# Patient Record
Sex: MALE | Race: WHITE | NOT HISPANIC OR LATINO | Employment: OTHER | URBAN - NONMETROPOLITAN AREA
[De-identification: names, ages, dates, MRNs, and addresses within clinical notes are randomized per-mention and may not be internally consistent; named-entity substitution may affect disease eponyms.]

---

## 2017-03-07 ENCOUNTER — TELEPHONE (OUTPATIENT)
Dept: UROLOGY | Facility: CLINIC | Age: 67
End: 2017-03-07

## 2017-03-07 DIAGNOSIS — R97.20 ELEVATED PROSTATE SPECIFIC ANTIGEN (PSA): Primary | ICD-10-CM

## 2017-03-07 NOTE — TELEPHONE ENCOUNTER
----- Message from Karen Abreu sent at 3/7/2017  3:22 PM CST -----  Contact: TEREZA SALGADO  Needs psa order for 3/9/17

## 2017-03-13 ENCOUNTER — OFFICE VISIT (OUTPATIENT)
Dept: UROLOGY | Facility: CLINIC | Age: 67
End: 2017-03-13

## 2017-03-13 VITALS
SYSTOLIC BLOOD PRESSURE: 142 MMHG | TEMPERATURE: 97.2 F | BODY MASS INDEX: 28.63 KG/M2 | DIASTOLIC BLOOD PRESSURE: 80 MMHG | WEIGHT: 200 LBS | HEIGHT: 70 IN

## 2017-03-13 DIAGNOSIS — R97.20 ELEVATED PROSTATE SPECIFIC ANTIGEN (PSA): ICD-10-CM

## 2017-03-13 DIAGNOSIS — N40.1 BENIGN NON-NODULAR PROSTATIC HYPERPLASIA WITH LOWER URINARY TRACT SYMPTOMS: Primary | ICD-10-CM

## 2017-03-13 LAB
BILIRUB BLD-MCNC: NEGATIVE MG/DL
CLARITY, POC: CLEAR
COLOR UR: YELLOW
GLUCOSE UR STRIP-MCNC: NEGATIVE MG/DL
KETONES UR QL: NEGATIVE
LEUKOCYTE EST, POC: NEGATIVE
NITRITE UR-MCNC: NEGATIVE MG/ML
PH UR: 7 [PH] (ref 5–8)
PROT UR STRIP-MCNC: NEGATIVE MG/DL
RBC # UR STRIP: NEGATIVE /UL
SP GR UR: 1.02 (ref 1–1.03)
UROBILINOGEN UR QL: NORMAL

## 2017-03-13 PROCEDURE — 81003 URINALYSIS AUTO W/O SCOPE: CPT | Performed by: UROLOGY

## 2017-03-13 PROCEDURE — 99213 OFFICE O/P EST LOW 20 MIN: CPT | Performed by: UROLOGY

## 2017-03-13 RX ORDER — TAMSULOSIN HYDROCHLORIDE 0.4 MG/1
1 CAPSULE ORAL DAILY
COMMUNITY
Start: 2017-02-28

## 2017-03-13 RX ORDER — TIOTROPIUM BROMIDE AND OLODATEROL 3.124; 2.736 UG/1; UG/1
2 SPRAY, METERED RESPIRATORY (INHALATION) DAILY
COMMUNITY
Start: 2017-02-15

## 2017-03-13 RX ORDER — AMLODIPINE BESYLATE AND BENAZEPRIL HYDROCHLORIDE 10; 40 MG/1; MG/1
1 CAPSULE ORAL DAILY
COMMUNITY
Start: 2017-02-08

## 2017-03-13 RX ORDER — HYDROCODONE BITARTRATE AND ACETAMINOPHEN 10; 325 MG/1; MG/1
1 TABLET ORAL EVERY 8 HOURS PRN
COMMUNITY
Start: 2017-03-09

## 2017-03-13 RX ORDER — NEBIVOLOL HYDROCHLORIDE 5 MG/1
TABLET ORAL
COMMUNITY
Start: 2017-02-08 | End: 2021-05-24

## 2017-03-13 NOTE — PROGRESS NOTES
Mr. Mcdonald is 66 y.o. male    CHIEF COMPLAINT: Her about my prostate    HPI  Benign Prostatic hyperplasia  Patient was initially diagnosed with benign prostatic hyperplasia approximately4 years ago. This was identified in the context of worsening voiding symptoms. Severity of the diease would be moderate. This has been managed with Alpha blockers. Alpha blockers has/have not improved the symptoms to an acceptable level to the patient.  His disease has not been complicated by retention or recurrent infection. His most bothersome symptom(s) is/are nocturia x 2, urinary frequency, weak stream.      The following portions of the patient's history were reviewed and updated as appropriate: allergies, current medications, past family history, past medical history, past social history, past surgical history and problem list.    Review of Systems   Constitutional: Negative for fever.   Gastrointestinal: Negative for nausea and vomiting.   Genitourinary: Positive for frequency (1 every night or ealry in the morning.) and urgency. Negative for difficulty urinating. Testicular pain: sometimes.         Current Outpatient Prescriptions:   •  amLODIPine-benazepril (LOTREL) 10-40 MG per capsule, , Disp: , Rfl:   •  BYSTOLIC 5 MG tablet, , Disp: , Rfl:   •  HYDROcodone-acetaminophen (NORCO)  MG per tablet, , Disp: , Rfl:   •  STIOLTO RESPIMAT 2.5-2.5 MCG/ACT aerosol solution, , Disp: , Rfl:   •  tamsulosin (FLOMAX) 0.4 MG capsule 24 hr capsule, , Disp: , Rfl:   •  triazolam (HALCION) 0.25 MG tablet, , Disp: , Rfl:   •  VENTOLIN  (90 BASE) MCG/ACT inhaler, , Disp: , Rfl:     Past Medical History   Diagnosis Date   • Elevated PSA    • Hypercholesteremia    • Hypertension        Past Surgical History   Procedure Laterality Date   • Back surgery         Social History     Social History   • Marital status: Single     Spouse name: N/A   • Number of children: N/A   • Years of education: N/A     Social History Main Topics  "  • Smoking status: Current Every Day Smoker     Packs/day: 1.00   • Smokeless tobacco: Not on file   • Alcohol use 1.8 oz/week     2 Cans of beer, 1 Shots of liquor per week   • Drug use: Not on file   • Sexual activity: Not on file     Other Topics Concern   • Not on file     Social History Narrative   • No narrative on file       Family History   Problem Relation Age of Onset   • Cancer Father        Visit Vitals   • /80   • Temp 97.2 °F (36.2 °C)   • Ht 70\" (177.8 cm)   • Wt 200 lb (90.7 kg)   • BMI 28.7 kg/m2       Physical Exam   Constitutional: He is oriented to person, place, and time. He appears well-developed and well-nourished.   Pulmonary/Chest: Effort normal.   Abdominal: Soft. He exhibits no distension and no mass. There is no tenderness. There is no rebound and no guarding. No hernia.   Genitourinary: Penis normal. Rectal exam shows no mass, no tenderness and anal tone normal. Enlarged: for the age of the patient. Right testis shows no mass, no swelling and no tenderness. Left testis shows no mass, no swelling and no tenderness. No hypospadias. No discharge found.   Genitourinary Comments:  The urethral meatus normal in position without evidence of stricture. Epididymis has some firmness and possible cystic mass at the tail of the left epididymis Vas Deferens is palpably normal.Anus and perineum without mass or tenderness. The prostate is approximately 35 ml. It is Symmetric, with a Soft consistency. There are no nodules present. . The seminal vesicles are Not palpable due to the size of the prostate.     Neurological: He is alert and oriented to person, place, and time.   Vitals reviewed.        Results for orders placed or performed in visit on 03/13/17   POC Urinalysis Dipstick, Automated   Result Value Ref Range    Color Yellow Yellow, Straw, Dark Yellow, [place]    Clarity, UA Clear Clear    Glucose, UA Negative Negative, 1000 mg/dL (3+) mg/dL    Bilirubin Negative Negative    Ketones, UA " Negative Negative    Specific Gravity  1.020 1.005 - 1.030    Blood, UA Negative Negative    pH, Urine 7.0 5.0 - 8.0    Protein, POC Negative Negative mg/dL    Urobilinogen, UA Normal Normal    Leukocytes Negative Negative    Nitrite, UA Negative Negative     03/17: PSA 2.8 ng/ml  Assessment and Plan  Diagnoses and all orders for this visit:    Benign non-nodular prostatic hyperplasia with lower urinary tract symptoms    Elevated prostate specific antigen (PSA)  -     POC Urinalysis Dipstick, Automated     BPH symptoms are relatively stable.  Recently started on tamsulosin but has decided he just wants to stop that.  Since he is having mostly irritative symptoms I talked to him about using an anticholinergic or Myrbetriq but he says he rather not be on a medication and just tolerated the symptoms.  Since he has had no complicating factors from his BPH I think is reasonable that he be on watchful waiting.    His PSA is stable at 2.8.  I initially saw him for a PSA just under 4.    Fabiano Saldaña MD  03/13/17  12:55 PM    EMR Dragon/Transcription disclaimer:  Much of this encounter note is an electronic transcription/translation of spoken language to printed text. The electronic translation of spoken language may permit erroneous, or at times, nonsensical words or phrases to be inadvertently transcribed; although I have reviewed the note for such errors, some may still exist.       Cc:

## 2018-05-22 ENCOUNTER — OFFICE VISIT (OUTPATIENT)
Dept: FAMILY MEDICINE CLINIC | Facility: CLINIC | Age: 68
End: 2018-05-22

## 2018-05-22 VITALS
TEMPERATURE: 98.1 F | OXYGEN SATURATION: 98 % | WEIGHT: 182 LBS | BODY MASS INDEX: 26.05 KG/M2 | HEIGHT: 70 IN | SYSTOLIC BLOOD PRESSURE: 146 MMHG | DIASTOLIC BLOOD PRESSURE: 78 MMHG | RESPIRATION RATE: 16 BRPM | HEART RATE: 76 BPM

## 2018-05-22 DIAGNOSIS — T63.481A ALLERGIC REACTION TO INSECT STING, ACCIDENTAL OR UNINTENTIONAL, INITIAL ENCOUNTER: Primary | ICD-10-CM

## 2018-05-22 DIAGNOSIS — Z72.0 TOBACCO ABUSE: ICD-10-CM

## 2018-05-22 DIAGNOSIS — E66.3 OVERWEIGHT (BMI 25.0-29.9): ICD-10-CM
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
## 2018-05-22 PROBLEM — G89.29 CHRONIC PAIN: Status: ACTIVE | Noted: 2018-05-22

## 2018-05-22 PROBLEM — I10 ESSENTIAL HYPERTENSION: Status: ACTIVE | Noted: 2018-05-22

## 2018-05-22 PROBLEM — R97.20 ELEVATED PSA: Status: ACTIVE | Noted: 2018-05-22

## 2018-05-22 PROCEDURE — 90471 IMMUNIZATION ADMIN: CPT | Performed by: NURSE PRACTITIONER

## 2018-05-22 PROCEDURE — 99203 OFFICE O/P NEW LOW 30 MIN: CPT | Performed by: NURSE PRACTITIONER

## 2018-05-22 PROCEDURE — 90715 TDAP VACCINE 7 YRS/> IM: CPT | Performed by: NURSE PRACTITIONER

## 2018-05-22 PROCEDURE — 96372 THER/PROPH/DIAG INJ SC/IM: CPT | Performed by: NURSE PRACTITIONER

## 2018-05-22 RX ORDER — AMOXICILLIN 500 MG/1
500 CAPSULE ORAL 2 TIMES DAILY
Qty: 20 CAPSULE | Refills: 0 | Status: SHIPPED | OUTPATIENT
Start: 2018-05-22 | End: 2018-06-01

## 2018-05-22 RX ORDER — DEXAMETHASONE SODIUM PHOSPHATE 10 MG/ML
10 INJECTION INTRAMUSCULAR; INTRAVENOUS ONCE
Status: COMPLETED | OUTPATIENT
Start: 2018-05-22 | End: 2018-05-22

## 2018-05-22 RX ORDER — CETIRIZINE HYDROCHLORIDE 10 MG/1
10 TABLET ORAL DAILY
Qty: 15 TABLET | Refills: 0 | Status: SHIPPED | OUTPATIENT
Start: 2018-05-22 | End: 2021-05-24

## 2018-05-22 RX ADMIN — DEXAMETHASONE SODIUM PHOSPHATE 10 MG: 10 INJECTION INTRAMUSCULAR; INTRAVENOUS at 10:49

## 2018-05-22 NOTE — PATIENT INSTRUCTIONS
"DASH Eating Plan  DASH stands for \"Dietary Approaches to Stop Hypertension.\" The DASH eating plan is a healthy eating plan that has been shown to reduce high blood pressure (hypertension). It may also reduce your risk for type 2 diabetes, heart disease, and stroke. The DASH eating plan may also help with weight loss.  What are tips for following this plan?  General guidelines   · Avoid eating more than 2,300 mg (milligrams) of salt (sodium) a day. If you have hypertension, you may need to reduce your sodium intake to 1,500 mg a day.  · Limit alcohol intake to no more than 1 drink a day for nonpregnant women and 2 drinks a day for men. One drink equals 12 oz of beer, 5 oz of wine, or 1½ oz of hard liquor.  · Work with your health care provider to maintain a healthy body weight or to lose weight. Ask what an ideal weight is for you.  · Get at least 30 minutes of exercise that causes your heart to beat faster (aerobic exercise) most days of the week. Activities may include walking, swimming, or biking.  · Work with your health care provider or diet and nutrition specialist (dietitian) to adjust your eating plan to your individual calorie needs.  Reading food labels   · Check food labels for the amount of sodium per serving. Choose foods with less than 5 percent of the Daily Value of sodium. Generally, foods with less than 300 mg of sodium per serving fit into this eating plan.  · To find whole grains, look for the word \"whole\" as the first word in the ingredient list.  Shopping   · Buy products labeled as \"low-sodium\" or \"no salt added.\"  · Buy fresh foods. Avoid canned foods and premade or frozen meals.  Cooking   · Avoid adding salt when cooking. Use salt-free seasonings or herbs instead of table salt or sea salt. Check with your health care provider or pharmacist before using salt substitutes.  · Do not garcia foods. Cook foods using healthy methods such as baking, boiling, grilling, and broiling instead.  · Cook with " heart-healthy oils, such as olive, canola, soybean, or sunflower oil.  Meal planning     · Eat a balanced diet that includes:  ¨ 5 or more servings of fruits and vegetables each day. At each meal, try to fill half of your plate with fruits and vegetables.  ¨ Up to 6-8 servings of whole grains each day.  ¨ Less than 6 oz of lean meat, poultry, or fish each day. A 3-oz serving of meat is about the same size as a deck of cards. One egg equals 1 oz.  ¨ 2 servings of low-fat dairy each day.  ¨ A serving of nuts, seeds, or beans 5 times each week.  ¨ Heart-healthy fats. Healthy fats called Omega-3 fatty acids are found in foods such as flaxseeds and coldwater fish, like sardines, salmon, and mackerel.  · Limit how much you eat of the following:  ¨ Canned or prepackaged foods.  ¨ Food that is high in trans fat, such as fried foods.  ¨ Food that is high in saturated fat, such as fatty meat.  ¨ Sweets, desserts, sugary drinks, and other foods with added sugar.  ¨ Full-fat dairy products.  · Do not salt foods before eating.  · Try to eat at least 2 vegetarian meals each week.  · Eat more home-cooked food and less restaurant, buffet, and fast food.  · When eating at a restaurant, ask that your food be prepared with less salt or no salt, if possible.  What foods are recommended?  The items listed may not be a complete list. Talk with your dietitian about what dietary choices are best for you.  Grains   Whole-grain or whole-wheat bread. Whole-grain or whole-wheat pasta. Brown rice. Oatmeal. Quinoa. Bulgur. Whole-grain and low-sodium cereals. Yany bread. Low-fat, low-sodium crackers. Whole-wheat flour tortillas.  Vegetables   Fresh or frozen vegetables (raw, steamed, roasted, or grilled). Low-sodium or reduced-sodium tomato and vegetable juice. Low-sodium or reduced-sodium tomato sauce and tomato paste. Low-sodium or reduced-sodium canned vegetables.  Fruits   All fresh, dried, or frozen fruit. Canned fruit in natural juice  (without added sugar).  Meat and other protein foods   Skinless chicken or turkey. Ground chicken or turkey. Pork with fat trimmed off. Fish and seafood. Egg whites. Dried beans, peas, or lentils. Unsalted nuts, nut butters, and seeds. Unsalted canned beans. Lean cuts of beef with fat trimmed off. Low-sodium, lean deli meat.  Dairy   Low-fat (1%) or fat-free (skim) milk. Fat-free, low-fat, or reduced-fat cheeses. Nonfat, low-sodium ricotta or cottage cheese. Low-fat or nonfat yogurt. Low-fat, low-sodium cheese.  Fats and oils   Soft margarine without trans fats. Vegetable oil. Low-fat, reduced-fat, or light mayonnaise and salad dressings (reduced-sodium). Canola, safflower, olive, soybean, and sunflower oils. Avocado.  Seasoning and other foods   Herbs. Spices. Seasoning mixes without salt. Unsalted popcorn and pretzels. Fat-free sweets.  What foods are not recommended?  The items listed may not be a complete list. Talk with your dietitian about what dietary choices are best for you.  Grains   Baked goods made with fat, such as croissants, muffins, or some breads. Dry pasta or rice meal packs.  Vegetables   Creamed or fried vegetables. Vegetables in a cheese sauce. Regular canned vegetables (not low-sodium or reduced-sodium). Regular canned tomato sauce and paste (not low-sodium or reduced-sodium). Regular tomato and vegetable juice (not low-sodium or reduced-sodium). Pickles. Olives.  Fruits   Canned fruit in a light or heavy syrup. Fried fruit. Fruit in cream or butter sauce.  Meat and other protein foods   Fatty cuts of meat. Ribs. Fried meat. Estrella. Sausage. Bologna and other processed lunch meats. Salami. Fatback. Hotdogs. Bratwurst. Salted nuts and seeds. Canned beans with added salt. Canned or smoked fish. Whole eggs or egg yolks. Chicken or turkey with skin.  Dairy   Whole or 2% milk, cream, and half-and-half. Whole or full-fat cream cheese. Whole-fat or sweetened yogurt. Full-fat cheese. Nondairy creamers.  Whipped toppings. Processed cheese and cheese spreads.  Fats and oils   Butter. Stick margarine. Lard. Shortening. Ghee. Estrella fat. Tropical oils, such as coconut, palm kernel, or palm oil.  Seasoning and other foods   Salted popcorn and pretzels. Onion salt, garlic salt, seasoned salt, table salt, and sea salt. Worcestershire sauce. Tartar sauce. Barbecue sauce. Teriyaki sauce. Soy sauce, including reduced-sodium. Steak sauce. Canned and packaged gravies. Fish sauce. Oyster sauce. Cocktail sauce. Horseradish that you find on the shelf. Ketchup. Mustard. Meat flavorings and tenderizers. Bouillon cubes. Hot sauce and Tabasco sauce. Premade or packaged marinades. Premade or packaged taco seasonings. Relishes. Regular salad dressings.  Where to find more information:  · National Heart, Lung, and Blood [place]: www.nhlbi.nih.gov  · American Heart Association: www.heart.org  Summary  · The DASH eating plan is a healthy eating plan that has been shown to reduce high blood pressure (hypertension). It may also reduce your risk for type 2 diabetes, heart disease, and stroke.  · With the DASH eating plan, you should limit salt (sodium) intake to 2,300 mg a day. If you have hypertension, you may need to reduce your sodium intake to 1,500 mg a day.  · When on the DASH eating plan, aim to eat more fresh fruits and vegetables, whole grains, lean proteins, low-fat dairy, and heart-healthy fats.  · Work with your health care provider or diet and nutrition specialist (dietitian) to adjust your eating plan to your individual calorie needs.  This information is not intended to replace advice given to you by your health care provider. Make sure you discuss any questions you have with your health care provider.  Document Released: 12/06/2012 Document Revised: 12/11/2017 Document Reviewed: 12/11/2017  [employer] Interactive Patient Education © 2017 [employer]  Insect Bite, Adult  An insect bite can make your skin red, itchy, and  swollen. Some insects can spread disease to people with a bite. However, most insect bites do not lead to disease, and most are not serious.  Follow these instructions at home:  Bite area care   · Do not scratch the bite area.  · Keep the bite area clean and dry.  · Wash the bite area every day with soap and water as told by your doctor.  · Check the bite area every day for signs of infection. Check for:  ¨ More redness, swelling, or pain.  ¨ Fluid or blood.  ¨ Warmth.  ¨ Pus.  Managing pain, itching, and swelling   · You may put any of these on the bite area as told by your doctor:  ¨ A baking soda paste.  ¨ Cortisone cream.  ¨ Calamine lotion.  · If directed, put ice on the bite area.  ¨ Put ice in a plastic bag.  ¨ Place a towel between your skin and the bag.  ¨ Leave the ice on for 20 minutes, 2-3 times a day.  Medicines   · Take medicines or put medicines on your skin only as told by your doctor.  · If you were prescribed an antibiotic medicine, use it as told by your doctor. Do not stop using the antibiotic even if your condition improves.  General instructions   · Keep all follow-up visits as told by your doctor. This is important.  How is this prevented?  To help you have a lower risk of insect bites:  · When you are outside, wear clothing that covers your arms and legs.  · Use insect repellent. The best insect repellents have:  ¨ An active ingredient of DEET, picaridin, oil of lemon eucalyptus (OLE), or [id number].  ¨ Higher amounts of DEET or another active ingredient than other repellents have.  · If your home windows do not have screens, think about putting some in.  Contact a doctor if:  · You have more redness, swelling, or pain in the bite area.  · You have fluid, blood, or pus coming from the bite area.  · The bite area feels warm.  · You have a fever.  Get help right away if:  · You have joint pain.  · You have a rash.  · You have shortness of breath.  · You feel more tired or sleepy than you normally  do.  · You have neck pain.  · You have a headache.  · You feel weaker than you normally do.  · You have chest pain.  · You have pain in your belly.  · You feel sick to your stomach (nauseous) or you throw up (vomit).  Summary  · An insect bite can make your skin red, itchy, and swollen.  · Do not scratch the bite area, and keep it clean and dry.  · Ice can help with pain and itching from the bite.  This information is not intended to replace advice given to you by your health care provider. Make sure you discuss any questions you have with your health care provider.  Document Released: 12/15/2001 Document Revised: 07/20/2017 Document Reviewed: 05/04/2016  [employer] Interactive Patient Education © 2017 [employer]  Steps to Quit Smoking  Smoking tobacco can be bad for your health. It can also affect almost every organ in your body. Smoking puts you and people around you at risk for many serious long-lasting (chronic) diseases. Quitting smoking is hard, but it is one of the best things that you can do for your health. It is never too late to quit.  What are the benefits of quitting smoking?  When you quit smoking, you lower your risk for getting serious diseases and conditions. They can include:  · Lung cancer or lung disease.  · Heart disease.  · Stroke.  · Heart attack.  · Not being able to have children (infertility).  · Weak bones (osteoporosis) and broken bones (fractures).  If you have coughing, wheezing, and shortness of breath, those symptoms may get better when you quit. You may also get sick less often. If you are pregnant, quitting smoking can help to lower your chances of having a baby of low birth weight.  What can I do to help me quit smoking?  Talk with your doctor about what can help you quit smoking. Some things you can do (strategies) include:  · Quitting smoking totally, instead of slowly cutting back how much you smoke over a period of time.  · Going to in-person counseling. You are more likely to  quit if you go to many counseling sessions.  · Using resources and support systems, such as:  ¨ Online chats with a counselor.  ¨ Phone quitlines.  ¨ Printed self-help materials.  ¨ Support groups or group counseling.  ¨ Text messaging programs.  ¨ Mobile phone apps or applications.  · Taking medicines. Some of these medicines may have nicotine in them. If you are pregnant or breastfeeding, do not take any medicines to quit smoking unless your doctor says it is okay. Talk with your doctor about counseling or other things that can help you.  Talk with your doctor about using more than one strategy at the same time, such as taking medicines while you are also going to in-person counseling. This can help make quitting easier.  What things can I do to make it easier to quit?  Quitting smoking might feel very hard at first, but there is a lot that you can do to make it easier. Take these steps:  · Talk to your family and friends. Ask them to support and encourage you.  · Call phone quitlines, reach out to support groups, or work with a counselor.  · Ask people who smoke to not smoke around you.  · Avoid places that make you want (trigger) to smoke, such as:  ¨ Bars.  ¨ Parties.  ¨ Smoke-break areas at work.  · Spend time with people who do not smoke.  · Lower the stress in your life. Stress can make you want to smoke. Try these things to help your stress:  ¨ Getting regular exercise.  ¨ Deep-breathing exercises.  ¨ Yoga.  ¨ Meditating.  ¨ Doing a body scan. To do this, close your eyes, focus on one area of your body at a time from head to toe, and notice which parts of your body are tense. Try to relax the muscles in those areas.  · Download or buy apps on your mobile phone or tablet that can help you stick to your quit plan. There are many free apps, such as QuitGuide from the CDC (Centers for Disease Control and Prevention). You can find more support from smokefree.gov and other websites.  This information is not  intended to replace advice given to you by your health care provider. Make sure you discuss any questions you have with your health care provider.  Document Released: 10/14/2010 Document Revised: 08/15/2017 Document Reviewed: 05/03/2016  [employer] Interactive Patient Education © 2017 [employer]

## 2018-05-22 NOTE — PROGRESS NOTES
"Chief Complaint   Patient presents with   • Facial Swelling     States he got a bite on the forehead Sunday and has been swelling and itching.   Seems to be getting worse.        HISTORY/ HPI:  Burt Mcdonald is a 67 y.o.  male who presents today for an acute complaint of bilateral edema below the eyes at the eyelid cheek junction and pruritis to the skin around the eyes and the forehead, onset approximately 3-4 days ago; Mr. Mcdonald states he was working on his lawnmower and felt a \"bite or sting\" to the right mid forehead; has used no medications to treat this complaint; denies fevers, chills, blurred vision/ visual disturbances, pain with occular motion, dyspnea, headaches, neck pain, or nausea; denies aggravating factors; pruritis is constant but has decreased since onset; currently  rates severity of symptoms 5 /10; has a medical history of hypertension, hyperlipidemia, chronic back pain, and an elevated PSA that is reported to be stable; reports KNDA; current everyday smoker, declines to discuss tobacco cessation at this time; consumes ETOH occasionally; denies use of illicit drugs; denies recent illnesses or additional concerns at this time.    Burt Mcdonald  has a past medical history of Elevated PSA; Hypercholesteremia; and Hypertension.    No Known Allergies    Current Outpatient Prescriptions:   •  amLODIPine-benazepril (LOTREL) 10-40 MG per capsule, , Disp: , Rfl:   •  BYSTOLIC 5 MG tablet, , Disp: , Rfl:   •  HYDROcodone-acetaminophen (NORCO)  MG per tablet, , Disp: , Rfl:   •  STIOLTO RESPIMAT 2.5-2.5 MCG/ACT aerosol solution, , Disp: , Rfl:   •  tamsulosin (FLOMAX) 0.4 MG capsule 24 hr capsule, , Disp: , Rfl:   •  triazolam (HALCION) 0.25 MG tablet, , Disp: , Rfl:   •  VENTOLIN  (90 BASE) MCG/ACT inhaler, , Disp: , Rfl:   Past Medical History:   Diagnosis Date   • Elevated PSA    • Hypercholesteremia    • Hypertension      Past Surgical History:   Procedure Laterality Date   • " BACK SURGERY     • EYE SURGERY      Bilateral cataract     Social History     Social History   • Marital status: Single     Social History Main Topics   • Smoking status: Current Every Day Smoker     Packs/day: 1.00   • Smokeless tobacco: Never Used   • Alcohol use 1.8 oz/week     2 Cans of beer, 1 Shots of liquor per week   • Drug use: No   • Sexual activity: Defer     Other Topics Concern   • Not on file     Family History   Problem Relation Age of Onset   • Cancer Father    • Cancer Mother    • No Known Problems Sister    • No Known Problems Brother    • No Known Problems Brother    • No Known Problems Brother      Family history, surgical history, past medical history, Allergies and med's reviewed with patient today and updated in [facility] EMR.     ROS:  Review of Systems   Constitutional: Negative.  Negative for activity change, appetite change, chills, diaphoresis, fatigue, fever and unexpected weight change.   HENT: Positive for facial swelling. Negative for congestion, ear discharge, ear pain, hearing loss, postnasal drip, rhinorrhea, sinus pain, sinus pressure, sneezing, sore throat, tinnitus and trouble swallowing.    Eyes: Positive for itching. Negative for photophobia, pain, discharge, redness and visual disturbance.   Respiratory: Positive for cough (chronic). Negative for chest tightness, shortness of breath and wheezing.    Cardiovascular: Negative.  Negative for chest pain, palpitations and leg swelling.   Gastrointestinal: Negative.  Negative for abdominal distention, abdominal pain, blood in stool, constipation, diarrhea, nausea and vomiting.   Endocrine: Negative.  Negative for cold intolerance, heat intolerance and polyuria.   Genitourinary: Positive for difficulty urinating (chronic). Negative for decreased urine volume, flank pain, frequency, hematuria and urgency.   Musculoskeletal: Positive for arthralgias (chronic). Negative for back pain, myalgias, neck pain and neck stiffness.   Skin: Positive  "for color change. Negative for pallor, rash and wound.   Allergic/Immunologic: Positive for environmental allergies.   Neurological: Positive for headaches (intermittent). Negative for dizziness, syncope, weakness and light-headedness.   Hematological: Negative for adenopathy. Does not bruise/bleed easily.   Psychiatric/Behavioral: Negative.  Negative for behavioral problems, decreased concentration, dysphoric mood, self-injury, sleep disturbance and suicidal ideas. The patient is not nervous/anxious.    All other systems reviewed and are negative.    OBJECTIVE:  Vitals:    05/22/18 1005   BP: 146/78   BP Location: Right arm   Patient Position: Sitting   Cuff Size: Adult   Pulse: 76   Resp: 16   Temp: 98.1 °F (36.7 °C)   TempSrc: Oral   SpO2: 98%   Weight: 82.6 kg (182 lb)   Height: 177.8 cm (70\")     Physical Exam   Constitutional: He is oriented to person, place, and time. He appears well-developed and well-nourished. He is cooperative.  Non-toxic appearance. He does not have a sickly appearance. He does not appear ill. No distress.   Weight 182 LBS; BMI 26.1   HENT:   Head: Normocephalic.   Right Ear: Hearing, tympanic membrane, external ear and ear canal normal. No drainage, swelling or tenderness. No foreign bodies. No mastoid tenderness. Tympanic membrane is not injected, not scarred, not perforated, not erythematous, not retracted and not bulging. No middle ear effusion. No decreased hearing is noted.   Left Ear: Hearing, tympanic membrane, external ear and ear canal normal. No drainage, swelling or tenderness. No foreign bodies. No mastoid tenderness. Tympanic membrane is not injected, not scarred, not perforated, not erythematous, not retracted and not bulging.  No middle ear effusion. No decreased hearing is noted.   Nose: Nose normal. Right sinus exhibits no maxillary sinus tenderness and no frontal sinus tenderness. Left sinus exhibits no maxillary sinus tenderness and no frontal sinus tenderness. "   Mouth/Throat: Uvula is midline, oropharynx is clear and moist and mucous membranes are normal. He does not have dentures. No oral lesions. Dental caries present. No uvula swelling. No oropharyngeal exudate, posterior oropharyngeal edema, posterior oropharyngeal erythema or tonsillar abscesses.   Eyes: EOM and lids are normal. Pupils are equal, round, and reactive to light. Right eye exhibits no discharge, no exudate and no hordeolum. No foreign body present in the right eye. Left eye exhibits no discharge, no exudate and no hordeolum. No foreign body present in the left eye. Right conjunctiva is injected. Right conjunctiva has no hemorrhage. Left conjunctiva is injected. Left conjunctiva has no hemorrhage. No scleral icterus. Right eye exhibits normal extraocular motion and no nystagmus. Left eye exhibits normal extraocular motion and no nystagmus.   Neck: Trachea normal and full passive range of motion without pain. Neck supple. No tracheal tenderness, no spinous process tenderness and no muscular tenderness present. No neck rigidity. Normal range of motion present. No Brudzinski's sign noted. No thyroid mass and no thyromegaly present.   Cardiovascular: Normal rate, regular rhythm, normal heart sounds and normal pulses.  Exam reveals no gallop, no distant heart sounds and no friction rub.    No murmur heard.  Pulmonary/Chest: Effort normal and breath sounds normal. No respiratory distress. He has no decreased breath sounds. He has no wheezes. He has no rhonchi. He has no rales. He exhibits no tenderness.   Lymphadenopathy:     He has no cervical adenopathy.   Neurological: He is alert and oriented to person, place, and time.   Skin: Skin is warm, dry and intact. Capillary refill takes less than 2 seconds. No rash noted. He is not diaphoretic. No cyanosis. No pallor. Nails show no clubbing.   1 cm erythematous papule to mid right forehead; edema noted under bilateral eyes at the lower eyelid cheek junction.  The  skin is intact.    Psychiatric: He has a normal mood and affect. His speech is normal and behavior is normal. Thought content normal.   Nursing note and vitals reviewed.    ASSESSMENT/ PLAN:  Burt was seen today for facial swelling.    Diagnoses and all orders for this visit:    Allergic reaction to insect sting, accidental or unintentional, initial encounter  -     amoxicillin (AMOXIL) 500 MG capsule; Take 1 capsule by mouth 2 (Two) Times a Day for 10 days.  -     dexamethasone (DECADRON) injection 10 mg; Inject 1 mL into the shoulder, thigh, or buttocks 1 (One) Time.  -     cetirizine (zyrTEC) 10 MG tablet; Take 1 tablet by mouth Daily.    Insect bite, initial encounter  -     Tdap Vaccine Greater Than or Equal To 6yo IM    Tobacco abuse    Overweight (BMI 25.0-29.9)    Orders Placed Today:   New Medications Ordered This Visit   Medications   • amoxicillin (AMOXIL) 500 MG capsule     Sig: Take 1 capsule by mouth 2 (Two) Times a Day for 10 days.     Dispense:  20 capsule     Refill:  0   • dexamethasone (DECADRON) injection 10 mg   • cetirizine (zyrTEC) 10 MG tablet     Sig: Take 1 tablet by mouth Daily.     Dispense:  15 tablet     Refill:  0      Management Plan:     1.  Allergic to insect sting  Rx provided for Zyrtec and Amoxicillin.  Use and the potential adverse effects of this medication were discussed in detail.  For any concerns of adverse effects of this medication I advised the patient to return to the clinic or seek immediate care at a local ED of choice.  I spoke with the patient about the benefits and risks associated with antibiotic therapy; the benefits include treating a bacterial infection, preventing the spread of disease, and minimizing serious complications associated with bacterial diseases; the risks include antibiotic resistance, allergic reactions, oral candida, and GI related side effects such as an upset stomach and diarrhea, as well as placing the patient at an increase risk for C-diff;  verbal acknowledgement of these risk were obtained.  IM Decadron provided in office today.  Pt notified of potential pros/risks of steroid treatment including rapid improvement of condition; allergic reaction, psychologic reaction (depression, anxiety & insomnia), hyperglycemia, skin change at injection site (color, dimpling), muscle weakness.  Pt is aware they may refuse treatment.  Tdap provided in office today.  CDC information sheet provided at discharge.  I advised the patient that if symptoms of blurred vision/ visual disturbances develop, pain with occular motion, or fever to either return to the ED or seek immediate care at a local ED of choice.  The patient agreed with this plan of care.    2.  Tobacco abuse  The patient understands the many dangers of continuing to use tobacco. Despite this, Mr. Mcdonald states quitting is not an immediate priority at this time and declines to discuss tobacco cessation.  I reminded the patient that if quitting becomes an increased priority to contact us for help with quitting.       3.  Overweight (BMI 26.1)  The patient is ill today, we will continue to educate the patient on the topics of diet and exercise with the goal of weight loss and preventative illness with each scheduled appointment.  Information about the DASH diet provided in AVS.    Risks/benefits of current and new medications discussed with the patient and or family today.  The patient/family are aware and accept that if there any side effects they should call or return to clinic as soon as possible.  Appropriate F/U discussed for topics addressed today. All questions were answered to the satisfactory state of patient/family.  Should symptoms fail to improve or worsen they agree to call or return to clinic or to go to the ER. Education handouts were offered on any new Rx if requested.  Discussed the importance of following up with any needed screening tests/labs/specialist appointments and any requested  follow-up recommended by me today.  Importance of maintaining follow-up discussed and patient accepts that missed appointments can delay diagnosis and potentially lead to worsening of conditions.    An After Visit Summary was printed and given to the patient at discharge.    Follow-up: Return if symptoms worsen or fail to improve.    Jorge Noel, VIVIANA 5/22/2018 10:13 AM  This note was electronically signed.

## 2018-06-28 ENCOUNTER — TRANSCRIBE ORDERS (OUTPATIENT)
Dept: ADMINISTRATIVE | Facility: HOSPITAL | Age: 68
End: 2018-06-28

## 2018-06-28 DIAGNOSIS — I73.9 CLAUDICATION (HCC): Primary | ICD-10-CM

## 2018-07-05 ENCOUNTER — HOSPITAL ENCOUNTER (OUTPATIENT)
Dept: ULTRASOUND IMAGING | Facility: HOSPITAL | Age: 68
Discharge: HOME | End: 2018-07-05
Attending: FAMILY MEDICINE | Admitting: FAMILY MEDICINE

## 2018-07-05 DIAGNOSIS — I73.9 CLAUDICATION (HCC): ICD-10-CM

## 2018-07-05 PROCEDURE — 93923 UPR/LXTR ART STDY 3+ LVLS: CPT

## 2018-07-05 PROCEDURE — 93924 LWR XTR VASC STDY BILAT: CPT | Performed by: SURGERY

## 2018-12-21 ENCOUNTER — HOSPITAL ENCOUNTER (OUTPATIENT)
Dept: GENERAL RADIOLOGY | Age: 68
Discharge: HOME | End: 2018-12-21
Payer: MEDICARE

## 2018-12-21 ENCOUNTER — HOSPITAL ENCOUNTER (OUTPATIENT)
Dept: PREADMISSION TESTING | Age: 68
Discharge: HOME | End: 2018-12-25
Payer: MEDICARE

## 2018-12-21 VITALS — HEIGHT: 70 IN | BODY MASS INDEX: 24.34 KG/M2 | WEIGHT: 170 LBS

## 2018-12-21 LAB
ALBUMIN SERPL-MCNC: 4.1 G/DL (ref 3.5–5.2)
ALP BLD-CCNC: 44 U/L (ref 40–130)
ALT SERPL-CCNC: 11 U/L (ref 5–41)
ANION GAP SERPL CALCULATED.3IONS-SCNC: 12 MMOL/L (ref 7–19)
APTT: 29.2 SEC (ref 26–36.2)
AST SERPL-CCNC: 20 U/L (ref 5–40)
BASOPHILS ABSOLUTE: 0 K/UL (ref 0–0.2)
BASOPHILS RELATIVE PERCENT: 0 % (ref 0–1)
BILIRUB SERPL-MCNC: 0.4 MG/DL (ref 0.2–1.2)
BILIRUBIN URINE: NEGATIVE
BLOOD, URINE: NEGATIVE
BUN BLDV-MCNC: 10 MG/DL (ref 8–23)
CALCIUM SERPL-MCNC: 9.2 MG/DL (ref 8.8–10.2)
CHLORIDE BLD-SCNC: 105 MMOL/L (ref 98–111)
CLARITY: CLEAR
CO2: 25 MMOL/L (ref 22–29)
COLOR: YELLOW
CREAT SERPL-MCNC: 0.7 MG/DL (ref 0.5–1.2)
EOSINOPHILS ABSOLUTE: 0 K/UL (ref 0–0.6)
EOSINOPHILS RELATIVE PERCENT: 0.8 % (ref 0–5)
GFR NON-AFRICAN AMERICAN: >60
GLUCOSE BLD-MCNC: 97 MG/DL (ref 74–109)
GLUCOSE URINE: NEGATIVE MG/DL
HCT VFR BLD CALC: 47.4 % (ref 42–52)
HEMOGLOBIN: 15.3 G/DL (ref 14–18)
INR BLD: 0.97 (ref 0.88–1.18)
KETONES, URINE: NEGATIVE MG/DL
LEUKOCYTE ESTERASE, URINE: NEGATIVE
LYMPHOCYTES ABSOLUTE: 1.3 K/UL (ref 1.1–4.5)
LYMPHOCYTES RELATIVE PERCENT: 26.4 % (ref 20–40)
MCH RBC QN AUTO: 30.9 PG (ref 27–31)
MCHC RBC AUTO-ENTMCNC: 32.3 G/DL (ref 33–37)
MCV RBC AUTO: 95.8 FL (ref 80–94)
MONOCYTES ABSOLUTE: 0.6 K/UL (ref 0–0.9)
MONOCYTES RELATIVE PERCENT: 12.2 % (ref 0–10)
NEUTROPHILS ABSOLUTE: 3.1 K/UL (ref 1.5–7.5)
NEUTROPHILS RELATIVE PERCENT: 60.4 % (ref 50–65)
NITRITE, URINE: NEGATIVE
PDW BLD-RTO: 13.5 % (ref 11.5–14.5)
PH UA: 7
PLATELET # BLD: 257 K/UL (ref 130–400)
PMV BLD AUTO: 10.6 FL (ref 9.4–12.4)
POTASSIUM SERPL-SCNC: 4.2 MMOL/L (ref 3.5–5)
PROTEIN UA: NEGATIVE MG/DL
PROTHROMBIN TIME: 12.3 SEC (ref 12–14.6)
RBC # BLD: 4.95 M/UL (ref 4.7–6.1)
SODIUM BLD-SCNC: 142 MMOL/L (ref 136–145)
SPECIFIC GRAVITY UA: 1.01
TOTAL PROTEIN: 6.9 G/DL (ref 6.6–8.7)
URINE REFLEX TO CULTURE: NORMAL
UROBILINOGEN, URINE: 0.2 E.U./DL
WBC # BLD: 5.1 K/UL (ref 4.8–10.8)

## 2018-12-21 PROCEDURE — 80053 COMPREHEN METABOLIC PANEL: CPT

## 2018-12-21 PROCEDURE — 93005 ELECTROCARDIOGRAM TRACING: CPT

## 2018-12-21 PROCEDURE — 81003 URINALYSIS AUTO W/O SCOPE: CPT

## 2018-12-21 PROCEDURE — 71046 X-RAY EXAM CHEST 2 VIEWS: CPT

## 2018-12-21 PROCEDURE — 85610 PROTHROMBIN TIME: CPT

## 2018-12-21 PROCEDURE — 85730 THROMBOPLASTIN TIME PARTIAL: CPT

## 2018-12-21 PROCEDURE — 85025 COMPLETE CBC W/AUTO DIFF WBC: CPT

## 2018-12-21 RX ORDER — FENOFIBRATE 145 MG/1
145 TABLET, COATED ORAL DAILY
COMMUNITY

## 2018-12-21 RX ORDER — HYDROCODONE BITARTRATE AND ACETAMINOPHEN 10; 325 MG/1; MG/1
1 TABLET ORAL EVERY 6 HOURS PRN
Status: ON HOLD | COMMUNITY
End: 2019-01-03

## 2018-12-21 RX ORDER — AMLODIPINE BESYLATE AND BENAZEPRIL HYDROCHLORIDE 10; 40 MG/1; MG/1
1 CAPSULE ORAL DAILY
COMMUNITY

## 2018-12-21 RX ORDER — NEBIVOLOL 5 MG/1
5 TABLET ORAL DAILY
COMMUNITY

## 2018-12-21 RX ORDER — OMEPRAZOLE 20 MG/1
20 CAPSULE, DELAYED RELEASE ORAL DAILY
COMMUNITY

## 2018-12-21 RX ORDER — TAMSULOSIN HYDROCHLORIDE 0.4 MG/1
0.4 CAPSULE ORAL NIGHTLY
COMMUNITY

## 2018-12-21 RX ORDER — GABAPENTIN 600 MG/1
600 TABLET ORAL 2 TIMES DAILY
COMMUNITY

## 2018-12-21 RX ORDER — ALBUTEROL SULFATE 90 UG/1
2 AEROSOL, METERED RESPIRATORY (INHALATION) EVERY 6 HOURS PRN
COMMUNITY

## 2018-12-24 ENCOUNTER — HOSPITAL ENCOUNTER (OUTPATIENT)
Dept: PREADMISSION TESTING | Age: 68
Discharge: HOME | End: 2018-12-24

## 2018-12-25 LAB
EKG P AXIS: 70 DEGREES
EKG P-R INTERVAL: 152 MS
EKG Q-T INTERVAL: 384 MS
EKG QRS DURATION: 86 MS
EKG QTC CALCULATION (BAZETT): 403 MS
EKG T AXIS: 61 DEGREES

## 2019-01-03 ENCOUNTER — HOSPITAL ENCOUNTER (OUTPATIENT)
Age: 69
Setting detail: OUTPATIENT SURGERY
Discharge: HOME | End: 2019-01-03
Payer: MEDICARE

## 2019-01-03 ENCOUNTER — ANESTHESIA EVENT (OUTPATIENT)
Dept: OPERATING ROOM | Age: 69
End: 2019-01-03
Payer: MEDICARE

## 2019-01-03 ENCOUNTER — APPOINTMENT (OUTPATIENT)
Dept: GENERAL RADIOLOGY | Age: 69
End: 2019-01-03
Payer: MEDICARE

## 2019-01-03 ENCOUNTER — ANESTHESIA (OUTPATIENT)
Dept: OPERATING ROOM | Age: 69
End: 2019-01-03
Payer: MEDICARE

## 2019-01-03 VITALS
OXYGEN SATURATION: 100 % | SYSTOLIC BLOOD PRESSURE: 129 MMHG | RESPIRATION RATE: 12 BRPM | TEMPERATURE: 96.7 F | DIASTOLIC BLOOD PRESSURE: 76 MMHG

## 2019-01-03 VITALS
SYSTOLIC BLOOD PRESSURE: 129 MMHG | HEIGHT: 70 IN | HEART RATE: 90 BPM | DIASTOLIC BLOOD PRESSURE: 66 MMHG | TEMPERATURE: 98.6 F | OXYGEN SATURATION: 96 % | BODY MASS INDEX: 24.34 KG/M2 | WEIGHT: 170 LBS | RESPIRATION RATE: 16 BRPM

## 2019-01-03 DIAGNOSIS — M54.16 LUMBAR RADICULOPATHY: Primary | ICD-10-CM

## 2019-01-03 PROBLEM — G89.29 CHRONIC BILATERAL LOW BACK PAIN WITHOUT SCIATICA: Status: ACTIVE | Noted: 2019-01-03

## 2019-01-03 PROBLEM — M54.50 CHRONIC BILATERAL LOW BACK PAIN WITHOUT SCIATICA: Status: ACTIVE | Noted: 2019-01-03

## 2019-01-03 LAB
ABO/RH: NORMAL
ANTIBODY SCREEN: NORMAL

## 2019-01-03 PROCEDURE — 2720000010 HC SURG SUPPLY STERILE

## 2019-01-03 PROCEDURE — 3700000001 HC ADD 15 MINUTES (ANESTHESIA)

## 2019-01-03 PROCEDURE — 3209999900 FLUORO FOR SURGICAL PROCEDURES

## 2019-01-03 PROCEDURE — C1751 CATH, INF, PER/CENT/MIDLINE: HCPCS

## 2019-01-03 PROCEDURE — C1821 INTERSPINOUS IMPLANT: HCPCS

## 2019-01-03 PROCEDURE — 3600000015 HC SURGERY LEVEL 5 ADDTL 15MIN

## 2019-01-03 PROCEDURE — 7100000000 HC PACU RECOVERY - FIRST 15 MIN

## 2019-01-03 PROCEDURE — 86901 BLOOD TYPING SEROLOGIC RH(D): CPT

## 2019-01-03 PROCEDURE — 6360000002 HC RX W HCPCS

## 2019-01-03 PROCEDURE — 2500000003 HC RX 250 WO HCPCS

## 2019-01-03 PROCEDURE — 86850 RBC ANTIBODY SCREEN: CPT

## 2019-01-03 PROCEDURE — 2500000003 HC RX 250 WO HCPCS: Performed by: NURSE ANESTHETIST, CERTIFIED REGISTERED

## 2019-01-03 PROCEDURE — 7100000001 HC PACU RECOVERY - ADDTL 15 MIN

## 2019-01-03 PROCEDURE — 6360000002 HC RX W HCPCS: Performed by: NURSE ANESTHETIST, CERTIFIED REGISTERED

## 2019-01-03 PROCEDURE — 7100000010 HC PHASE II RECOVERY - FIRST 15 MIN

## 2019-01-03 PROCEDURE — 3700000000 HC ANESTHESIA ATTENDED CARE

## 2019-01-03 PROCEDURE — C9290 INJ, BUPIVACAINE LIPOSOME: HCPCS

## 2019-01-03 PROCEDURE — 2709999900 HC NON-CHARGEABLE SUPPLY

## 2019-01-03 PROCEDURE — 2580000003 HC RX 258

## 2019-01-03 PROCEDURE — 72020 X-RAY EXAM OF SPINE 1 VIEW: CPT

## 2019-01-03 PROCEDURE — 86900 BLOOD TYPING SEROLOGIC ABO: CPT

## 2019-01-03 PROCEDURE — 3600000005 HC SURGERY LEVEL 5 BASE

## 2019-01-03 PROCEDURE — 2780000010 HC IMPLANT OTHER

## 2019-01-03 PROCEDURE — 7100000011 HC PHASE II RECOVERY - ADDTL 15 MIN

## 2019-01-03 PROCEDURE — 6370000000 HC RX 637 (ALT 250 FOR IP)

## 2019-01-03 DEVICE — IMPLANTABLE DEVICE
Type: IMPLANTABLE DEVICE | Site: BACK | Status: FUNCTIONAL
Brand: COFLEX INTERLAMINAR TECHNOLOGY, 12MM

## 2019-01-03 DEVICE — AGENT HEMSTAT 8ML FLX TIP MTRX + DISP SURGIFLO: Type: IMPLANTABLE DEVICE | Site: BACK | Status: FUNCTIONAL

## 2019-01-03 RX ORDER — OXYCODONE HYDROCHLORIDE AND ACETAMINOPHEN 5; 325 MG/1; MG/1
2 TABLET ORAL PRN
Status: COMPLETED | OUTPATIENT
Start: 2019-01-03 | End: 2019-01-03

## 2019-01-03 RX ORDER — MORPHINE SULFATE 4 MG/ML
4 INJECTION, SOLUTION INTRAMUSCULAR; INTRAVENOUS
Status: DISCONTINUED | OUTPATIENT
Start: 2019-01-03 | End: 2019-01-03 | Stop reason: HOSPADM

## 2019-01-03 RX ORDER — SODIUM CHLORIDE, SODIUM LACTATE, POTASSIUM CHLORIDE, CALCIUM CHLORIDE 600; 310; 30; 20 MG/100ML; MG/100ML; MG/100ML; MG/100ML
INJECTION, SOLUTION INTRAVENOUS CONTINUOUS
Status: DISCONTINUED | OUTPATIENT
Start: 2019-01-03 | End: 2019-01-03 | Stop reason: HOSPADM

## 2019-01-03 RX ORDER — SODIUM CHLORIDE 0.9 % (FLUSH) 0.9 %
10 SYRINGE (ML) INJECTION EVERY 12 HOURS SCHEDULED
Status: DISCONTINUED | OUTPATIENT
Start: 2019-01-03 | End: 2019-01-03 | Stop reason: HOSPADM

## 2019-01-03 RX ORDER — SUFENTANIL CITRATE 50 UG/ML
INJECTION EPIDURAL; INTRAVENOUS PRN
Status: DISCONTINUED | OUTPATIENT
Start: 2019-01-03 | End: 2019-01-03 | Stop reason: SDUPTHER

## 2019-01-03 RX ORDER — VANCOMYCIN HYDROCHLORIDE 1 G/20ML
INJECTION, POWDER, LYOPHILIZED, FOR SOLUTION INTRAVENOUS PRN
Status: DISCONTINUED | OUTPATIENT
Start: 2019-01-03 | End: 2019-01-03 | Stop reason: HOSPADM

## 2019-01-03 RX ORDER — ONDANSETRON 2 MG/ML
INJECTION INTRAMUSCULAR; INTRAVENOUS PRN
Status: DISCONTINUED | OUTPATIENT
Start: 2019-01-03 | End: 2019-01-03 | Stop reason: SDUPTHER

## 2019-01-03 RX ORDER — DIPHENHYDRAMINE HYDROCHLORIDE 50 MG/ML
12.5 INJECTION INTRAMUSCULAR; INTRAVENOUS
Status: DISCONTINUED | OUTPATIENT
Start: 2019-01-03 | End: 2019-01-03 | Stop reason: HOSPADM

## 2019-01-03 RX ORDER — SCOLOPAMINE TRANSDERMAL SYSTEM 1 MG/1
1 PATCH, EXTENDED RELEASE TRANSDERMAL ONCE
Status: DISCONTINUED | OUTPATIENT
Start: 2019-01-03 | End: 2019-01-03 | Stop reason: HOSPADM

## 2019-01-03 RX ORDER — BUPIVACAINE HYDROCHLORIDE AND EPINEPHRINE 2.5; 5 MG/ML; UG/ML
INJECTION, SOLUTION EPIDURAL; INFILTRATION; INTRACAUDAL; PERINEURAL PRN
Status: DISCONTINUED | OUTPATIENT
Start: 2019-01-03 | End: 2019-01-03 | Stop reason: HOSPADM

## 2019-01-03 RX ORDER — HYDROCODONE BITARTRATE AND ACETAMINOPHEN 10; 325 MG/1; MG/1
1 TABLET ORAL EVERY 6 HOURS PRN
Qty: 40 TABLET | Refills: 0 | Status: SHIPPED | OUTPATIENT
Start: 2019-01-03 | End: 2019-01-17

## 2019-01-03 RX ORDER — PROMETHAZINE HYDROCHLORIDE 25 MG/ML
6.25 INJECTION, SOLUTION INTRAMUSCULAR; INTRAVENOUS
Status: DISCONTINUED | OUTPATIENT
Start: 2019-01-03 | End: 2019-01-03 | Stop reason: HOSPADM

## 2019-01-03 RX ORDER — HYDRALAZINE HYDROCHLORIDE 20 MG/ML
5 INJECTION INTRAMUSCULAR; INTRAVENOUS EVERY 10 MIN PRN
Status: DISCONTINUED | OUTPATIENT
Start: 2019-01-03 | End: 2019-01-03 | Stop reason: HOSPADM

## 2019-01-03 RX ORDER — PROPOFOL 10 MG/ML
INJECTION, EMULSION INTRAVENOUS PRN
Status: DISCONTINUED | OUTPATIENT
Start: 2019-01-03 | End: 2019-01-03 | Stop reason: SDUPTHER

## 2019-01-03 RX ORDER — EPHEDRINE SULFATE 50 MG/ML
INJECTION, SOLUTION INTRAVENOUS PRN
Status: DISCONTINUED | OUTPATIENT
Start: 2019-01-03 | End: 2019-01-03 | Stop reason: SDUPTHER

## 2019-01-03 RX ORDER — FENTANYL CITRATE 50 UG/ML
50 INJECTION, SOLUTION INTRAMUSCULAR; INTRAVENOUS
Status: DISCONTINUED | OUTPATIENT
Start: 2019-01-03 | End: 2019-01-03 | Stop reason: HOSPADM

## 2019-01-03 RX ORDER — DEXAMETHASONE SODIUM PHOSPHATE 10 MG/ML
INJECTION INTRAMUSCULAR; INTRAVENOUS PRN
Status: DISCONTINUED | OUTPATIENT
Start: 2019-01-03 | End: 2019-01-03 | Stop reason: SDUPTHER

## 2019-01-03 RX ORDER — LIDOCAINE HYDROCHLORIDE 10 MG/ML
1 INJECTION, SOLUTION EPIDURAL; INFILTRATION; INTRACAUDAL; PERINEURAL
Status: DISCONTINUED | OUTPATIENT
Start: 2019-01-03 | End: 2019-01-03 | Stop reason: HOSPADM

## 2019-01-03 RX ORDER — MORPHINE SULFATE 2 MG/ML
2 INJECTION, SOLUTION INTRAMUSCULAR; INTRAVENOUS EVERY 5 MIN PRN
Status: DISCONTINUED | OUTPATIENT
Start: 2019-01-03 | End: 2019-01-03 | Stop reason: HOSPADM

## 2019-01-03 RX ORDER — ROCURONIUM BROMIDE 10 MG/ML
INJECTION, SOLUTION INTRAVENOUS PRN
Status: DISCONTINUED | OUTPATIENT
Start: 2019-01-03 | End: 2019-01-03 | Stop reason: SDUPTHER

## 2019-01-03 RX ORDER — SODIUM CHLORIDE 0.9 % (FLUSH) 0.9 %
10 SYRINGE (ML) INJECTION PRN
Status: DISCONTINUED | OUTPATIENT
Start: 2019-01-03 | End: 2019-01-03 | Stop reason: HOSPADM

## 2019-01-03 RX ORDER — KETAMINE HYDROCHLORIDE 100 MG/ML
INJECTION, SOLUTION INTRAMUSCULAR; INTRAVENOUS PRN
Status: DISCONTINUED | OUTPATIENT
Start: 2019-01-03 | End: 2019-01-03 | Stop reason: SDUPTHER

## 2019-01-03 RX ORDER — OXYCODONE HYDROCHLORIDE AND ACETAMINOPHEN 5; 325 MG/1; MG/1
1 TABLET ORAL PRN
Status: COMPLETED | OUTPATIENT
Start: 2019-01-03 | End: 2019-01-03

## 2019-01-03 RX ORDER — MIDAZOLAM HYDROCHLORIDE 1 MG/ML
INJECTION INTRAMUSCULAR; INTRAVENOUS PRN
Status: DISCONTINUED | OUTPATIENT
Start: 2019-01-03 | End: 2019-01-03 | Stop reason: SDUPTHER

## 2019-01-03 RX ORDER — METOCLOPRAMIDE HYDROCHLORIDE 5 MG/ML
10 INJECTION INTRAMUSCULAR; INTRAVENOUS
Status: DISCONTINUED | OUTPATIENT
Start: 2019-01-03 | End: 2019-01-03 | Stop reason: HOSPADM

## 2019-01-03 RX ORDER — MORPHINE SULFATE 2 MG/ML
4 INJECTION, SOLUTION INTRAMUSCULAR; INTRAVENOUS EVERY 5 MIN PRN
Status: DISCONTINUED | OUTPATIENT
Start: 2019-01-03 | End: 2019-01-03 | Stop reason: HOSPADM

## 2019-01-03 RX ORDER — MIDAZOLAM HYDROCHLORIDE 1 MG/ML
2 INJECTION INTRAMUSCULAR; INTRAVENOUS
Status: DISCONTINUED | OUTPATIENT
Start: 2019-01-03 | End: 2019-01-03 | Stop reason: HOSPADM

## 2019-01-03 RX ORDER — SUCCINYLCHOLINE/SOD CL,ISO/PF 100 MG/5ML
SYRINGE (ML) INTRAVENOUS PRN
Status: DISCONTINUED | OUTPATIENT
Start: 2019-01-03 | End: 2019-01-03 | Stop reason: SDUPTHER

## 2019-01-03 RX ORDER — MEPERIDINE HYDROCHLORIDE 50 MG/ML
12.5 INJECTION INTRAMUSCULAR; INTRAVENOUS; SUBCUTANEOUS EVERY 5 MIN PRN
Status: DISCONTINUED | OUTPATIENT
Start: 2019-01-03 | End: 2019-01-03 | Stop reason: HOSPADM

## 2019-01-03 RX ORDER — ENALAPRILAT 2.5 MG/2ML
1.25 INJECTION INTRAVENOUS
Status: DISCONTINUED | OUTPATIENT
Start: 2019-01-03 | End: 2019-01-03 | Stop reason: HOSPADM

## 2019-01-03 RX ORDER — LABETALOL HYDROCHLORIDE 5 MG/ML
5 INJECTION, SOLUTION INTRAVENOUS EVERY 10 MIN PRN
Status: DISCONTINUED | OUTPATIENT
Start: 2019-01-03 | End: 2019-01-03 | Stop reason: HOSPADM

## 2019-01-03 RX ADMIN — PHENYLEPHRINE HYDROCHLORIDE 80 MCG: 10 INJECTION INTRAVENOUS at 08:52

## 2019-01-03 RX ADMIN — EPHEDRINE SULFATE 10 MG: 50 INJECTION, SOLUTION INTRAMUSCULAR; INTRAVENOUS; SUBCUTANEOUS at 07:32

## 2019-01-03 RX ADMIN — PROPOFOL 120 MG: 10 INJECTION, EMULSION INTRAVENOUS at 07:20

## 2019-01-03 RX ADMIN — SODIUM CHLORIDE, SODIUM LACTATE, POTASSIUM CHLORIDE, AND CALCIUM CHLORIDE: 600; 310; 30; 20 INJECTION, SOLUTION INTRAVENOUS at 06:01

## 2019-01-03 RX ADMIN — PHENYLEPHRINE HYDROCHLORIDE 80 MCG: 10 INJECTION INTRAVENOUS at 08:24

## 2019-01-03 RX ADMIN — SUFENTANIL CITRATE 30 MCG: 50 INJECTION, SOLUTION EPIDURAL; INTRAVENOUS at 07:20

## 2019-01-03 RX ADMIN — ROCURONIUM BROMIDE 5 MG: 10 INJECTION INTRAVENOUS at 07:20

## 2019-01-03 RX ADMIN — WATER 1 G: 1 INJECTION INTRAMUSCULAR; INTRAVENOUS; SUBCUTANEOUS at 07:35

## 2019-01-03 RX ADMIN — ONDANSETRON HYDROCHLORIDE 4 MG: 2 INJECTION, SOLUTION INTRAMUSCULAR; INTRAVENOUS at 08:50

## 2019-01-03 RX ADMIN — DEXAMETHASONE SODIUM PHOSPHATE 10 MG: 10 INJECTION INTRAMUSCULAR; INTRAVENOUS at 07:34

## 2019-01-03 RX ADMIN — OXYCODONE AND ACETAMINOPHEN 2 TABLET: 5; 325 TABLET ORAL at 10:32

## 2019-01-03 RX ADMIN — EPHEDRINE SULFATE 10 MG: 50 INJECTION, SOLUTION INTRAMUSCULAR; INTRAVENOUS; SUBCUTANEOUS at 08:24

## 2019-01-03 RX ADMIN — PHENYLEPHRINE HYDROCHLORIDE 80 MCG: 10 INJECTION INTRAVENOUS at 08:19

## 2019-01-03 RX ADMIN — EPHEDRINE SULFATE 10 MG: 50 INJECTION, SOLUTION INTRAMUSCULAR; INTRAVENOUS; SUBCUTANEOUS at 08:03

## 2019-01-03 RX ADMIN — Medication 120 MG: at 07:20

## 2019-01-03 RX ADMIN — SODIUM CHLORIDE, SODIUM LACTATE, POTASSIUM CHLORIDE, AND CALCIUM CHLORIDE: 600; 310; 30; 20 INJECTION, SOLUTION INTRAVENOUS at 07:50

## 2019-01-03 RX ADMIN — EPHEDRINE SULFATE 5 MG: 50 INJECTION, SOLUTION INTRAMUSCULAR; INTRAVENOUS; SUBCUTANEOUS at 08:52

## 2019-01-03 RX ADMIN — EPHEDRINE SULFATE 5 MG: 50 INJECTION, SOLUTION INTRAMUSCULAR; INTRAVENOUS; SUBCUTANEOUS at 08:34

## 2019-01-03 RX ADMIN — HYDROMORPHONE HYDROCHLORIDE 1 MG: 1 INJECTION, SOLUTION INTRAMUSCULAR; INTRAVENOUS; SUBCUTANEOUS at 09:30

## 2019-01-03 RX ADMIN — MIDAZOLAM 2 MG: 1 INJECTION INTRAMUSCULAR; INTRAVENOUS at 07:12

## 2019-01-03 RX ADMIN — Medication 50 MG: at 07:32

## 2019-01-03 ASSESSMENT — PAIN DESCRIPTION - LOCATION
LOCATION: BACK
LOCATION: BACK

## 2019-01-03 ASSESSMENT — PAIN DESCRIPTION - PAIN TYPE
TYPE: SURGICAL PAIN
TYPE: SURGICAL PAIN

## 2019-01-03 ASSESSMENT — PAIN SCALES - GENERAL
PAINLEVEL_OUTOF10: 5
PAINLEVEL_OUTOF10: 5
PAINLEVEL_OUTOF10: 2
PAINLEVEL_OUTOF10: 5

## 2019-01-03 ASSESSMENT — PAIN - FUNCTIONAL ASSESSMENT: PAIN_FUNCTIONAL_ASSESSMENT: 0-10

## 2019-01-03 ASSESSMENT — PAIN DESCRIPTION - DESCRIPTORS
DESCRIPTORS: ACHING;BURNING;CONSTANT
DESCRIPTORS: ACHING

## 2019-01-03 ASSESSMENT — PAIN DESCRIPTION - PROGRESSION: CLINICAL_PROGRESSION: GRADUALLY IMPROVING

## 2020-08-02 NOTE — PROGRESS NOTES
Primary Care Provider: Arjun Roman MD  Requesting Provider: Arjun Roman MD    Chief Complaint:   Chief Complaint   Patient presents with   • Back Pain     Patient is here today for back pain and bilateral leg pain. Patient has had two back surgeries. Back surgery oct 2015 by Dr. Herring and a L3 -L-4 Laminectomy by Dr. Herring 1/3/19. Patient brought cd for Jorge to review.     History of Present Illness  Consultation today at the request of Arjun Roman MD    HPI:  Burt Mcdonald is a 69 y.o. male who presents today with a complaint of intermittent lumbar spasms and bilateral leg pain, 100% legs.  2 previous spine surgeries by Dr. Db Herring in 2015 and 2019.  No recent injury.    Gradual and progressive worsening of bilateral leg pain since December 2019.  He reports intermittent lumbar spasms with a primary complaint of burning dysesthesias to the right lateral leg to the ankle and paresthesias to the left thigh that extends just below the knee.  His leg discomfort primarily occurs with standing and walking.  He additionally reports burning dysesthesias to the bilateral feet with prolonged standing, walking, and while lying down.  Alleviating factors include sitting, and use of hydrocodone and gabapentin which he obtains from his PCP, Dr. Tod Roman.  He denies gait or balance instabilities or falls.  He additionally denies fevers, chills, night sweats, unexplained weight loss, saddle anesthesia, or bowel bladder dysfunction.  Mr. Mcdonald currently rates the severity of his symptoms were 5/10.  No additional concerns at this time.    Mr. Mcdonald completed a dedicated course of physician directed physical therapy in February 2020 with [facility] which was not beneficial in treating his discomfort.  Intermittent chiropractic care with Dr. Dimas.  He has not been evaluated by pain management.    Oswestry Disability Index = 30%   Score   Pain Intensity Very mild pain-1   Personal Care Look after  myself without pain-0   Lifting Can lift heavy weights with extra pain-1   Walking Pain prevents > 100 yards-3   Sitting Pain prevents sitting > 1 hr-2   Standing Pain limits standing to < 1/2 hr-3   Sleeping Occasionally disturbed-1   Sex Life (if applicable) My sex life is normal-1   Social Life I do not go out as often because of pain-3   Traveling Travel gives me extra pain-1   (Payton et al, 1980)    SCORE INTERPRETATION OF THE OSWESTRY LBP DISABILITY QUESTIONNAIRE     20-40% Moderate disability This group experiences more pain and problems with sitting, lifting, and standing. Travel and social life are more difficult and they may well be off  work. Personal care, sexual activity, and sleeping are not grossly affected, and the back condition can usually be managed by conservative means.       ROS  Review of Systems   Constitutional: Positive for activity change and fatigue.   HENT: Negative.    Eyes: Negative.    Respiratory: Positive for shortness of breath.    Cardiovascular: Positive for leg swelling.   Gastrointestinal: Negative.    Endocrine: Negative.    Genitourinary: Negative.    Musculoskeletal: Negative.    Skin: Negative.    Allergic/Immunologic: Negative.    Neurological: Negative.    Hematological: Negative.    Psychiatric/Behavioral: Positive for sleep disturbance.   All other systems reviewed and are negative.    Past Medical History:   Diagnosis Date   • Elevated PSA    • Hypercholesteremia    • Hypertension        Past Surgical History:   Procedure Laterality Date   • BACK SURGERY     • EYE SURGERY      Bilateral cataract       Family History: family history includes Cancer in his father and mother; No Known Problems in his brother, brother, brother, and sister.    Social History:  reports that he has been smoking. He has been smoking about 1.00 pack per day. He has never used smokeless tobacco. He reports that he drinks about 3.0 standard drinks of alcohol per week. He reports that he does  "not use drugs.    Medications:    Current Outpatient Medications:   •  amLODIPine-benazepril (LOTREL) 10-40 MG per capsule, , Disp: , Rfl:   •  BYSTOLIC 5 MG tablet, , Disp: , Rfl:   •  cetirizine (zyrTEC) 10 MG tablet, Take 1 tablet by mouth Daily., Disp: 15 tablet, Rfl: 0  •  fenofibrate (TRICOR) 145 MG tablet, Take 145 mg by mouth., Disp: , Rfl:   •  gabapentin (NEURONTIN) 600 MG tablet, Take 600 mg by mouth., Disp: , Rfl:   •  HYDROcodone-acetaminophen (NORCO)  MG per tablet, , Disp: , Rfl:   •  omeprazole (priLOSEC) 20 MG capsule, Take 20 mg by mouth., Disp: , Rfl:   •  STIOLTO RESPIMAT 2.5-2.5 MCG/ACT aerosol solution, , Disp: , Rfl:   •  tamsulosin (FLOMAX) 0.4 MG capsule 24 hr capsule, , Disp: , Rfl:   •  triazolam (HALCION) 0.25 MG tablet, , Disp: , Rfl:   •  VENTOLIN  (90 BASE) MCG/ACT inhaler, , Disp: , Rfl:     Allergies:  Patient has no known allergies.    Objective   Ht 177.8 cm (70\")   Wt 82.6 kg (182 lb)   BMI 26.11 kg/m²   Physical Exam   Constitutional: He is oriented to person, place, and time. Vital signs are normal. He appears well-developed and well-nourished. He is cooperative.  Non-toxic appearance. He does not have a sickly appearance. He does not appear ill. No distress.   BMI 26.1   HENT:   Head: Normocephalic and atraumatic.   Right Ear: Hearing normal.   Left Ear: Hearing normal.   Mouth/Throat: Mucous membranes are normal.   Eyes: Pupils are equal, round, and reactive to light. Conjunctivae and EOM are normal.   Neck: Trachea normal and full passive range of motion without pain. Neck supple.   Cardiovascular: Normal rate and regular rhythm.   Pulmonary/Chest: Effort normal. No accessory muscle usage. No apnea, no tachypnea and no bradypnea. No respiratory distress.   Abdominal: Soft. Normal appearance.   Neurological: He is alert and oriented to person, place, and time. Gait normal. GCS eye subscore is 4. GCS verbal subscore is 5. GCS motor subscore is 6.   Reflex " Scores:       Tricep reflexes are 1+ on the right side and 1+ on the left side.       Bicep reflexes are 1+ on the right side and 1+ on the left side.       Brachioradialis reflexes are 1+ on the right side and 1+ on the left side.       Patellar reflexes are 1+ on the right side and 2+ on the left side.       Achilles reflexes are 2+ on the right side and 2+ on the left side.  Skin: Skin is warm, dry and intact. He is not diaphoretic.   Psychiatric: He has a normal mood and affect. His speech is normal and behavior is normal.   Nursing note and vitals reviewed.    Neurologic Exam     Mental Status   Oriented to person, place, and time.   Attention: normal. Concentration: normal.   Speech: speech is normal   Level of consciousness: alert    Cranial Nerves     CN II   Visual fields full to confrontation.     CN III, IV, VI   Pupils are equal, round, and reactive to light.  Extraocular motions are normal.     CN V   Facial sensation intact.     CN VII   Facial expression full, symmetric.     CN VIII   CN VIII normal.     CN IX, X   CN IX normal.     CN XI   CN XI normal.     Motor Exam   Muscle bulk: normal  Overall muscle tone: normal  Right arm tone: normal  Left arm tone: normal  Right arm pronator drift: absent  Left arm pronator drift: absent  Right leg tone: normal  Left leg tone: normal    Strength   Right deltoid: 5/5  Left deltoid: 5/5  Right biceps: 5/5  Left biceps: 5/5  Right triceps: 5/5  Left triceps: 5/5  Right wrist extension: 5/5  Left wrist extension: 5/5  Right iliopsoas: 5/5  Left iliopsoas: 4/5  Right quadriceps: 5/5  Left quadriceps: 5/5  Right anterior tibial: 5/5  Left anterior tibial: 5/5  Right posterior tibial: 5/5  Left posterior tibial: 5/5  Left IP +4/5  Right EHL 5/5  Left EHL 5/5     Sensory Exam   Light touch normal.     Gait, Coordination, and Reflexes     Gait  Gait: normal    Tremor   Resting tremor: absent  Intention tremor: absent  Action tremor: absent    Reflexes   Right  brachioradialis: 1+  Left brachioradialis: 1+  Right biceps: 1+  Left biceps: 1+  Right triceps: 1+  Left triceps: 1+  Right patellar: 1+  Left patellar: 2+  Right achilles: 2+  Left achilles: 2+  Right : 4+  Left : 4+  Right plantar: equivocal  Left plantar: equivocal  Right Rivera: absent  Left Rivera: absent  Right ankle clonus: absent  Left ankle clonus: absent  Right pendular knee jerk: absent  Left pendular knee jerk: absent    Imaging: (independent review and interpretation)  7/29/2020 MRI of the lumbar spine shows no acute fractures, no bone marrow signal change, moderate to severe multilevel foraminal stenosis and ligamentous flavum hypertrophy resulting in central canal narrowing and bilateral foraminal stenosis at L2-3, L3-4 L4-5, and a right paracentral disc protrusion at L5-S1 resulting in right foraminal stenosis.                  ASSESSMENT and PLAN  Burt Mcdonald is a 69 y.o. male with a significant medical history of 2 prior lumbar surgeries by Dr. Db Herring in 2015 and 2019, hypertension, hyperlipidemia, tobacco abuse, and he is overweight.  He presents today with a new problem of intermittent lumbar spasms, primarily bilateral leg pain, numbness, and tingling that occurs with standing and walking and resolves with sitting.  Physical exam findings of +4/5 left IP weakness, 1+ upper extremity and right patellar reflexes, otherwise neurologically intact.    TREATMENT RECOMMENDATIONS ...  Lower back and bilateral leg pain  Lumbar stenosis with neurogenic claudication  Differential diagnosis include, but are not limited to degenerative lumbar stenosis with neurogenic claudication, spondylolisthesis with mechanical instability, degenerative facet arthropathy, failed back syndrome after surgery    We will proceed today by obtaining x-rays of the lumbar spine, 4 views lateral: Upright, supine, flexion, and extension to assess for areas of lumbar instability.  I recommended an additional course  of a physician directed physical therapy as a means of first-line conservative management for lumbar back pain.  Mr. Mcdonald declined this recommendation at this time.  I additionally recommended a referral to pain management for further evaluation and treatment of lumbar back and bilateral leg pain with analgesics, epidural/facet injection and consideration of a spinal cord stimulator or intrathecal pump.  Mr. Mcdonald declined this recommendation as well.  I recommended he continue hydrocodone and gabapentin as previously prescribed by his PCP.  B/R/AE and use discussed.    Dr. Wooten notified, reviewed imaging, and assessed patient.  Dr. Wooten discussed with Mr. Mcdonald conservative versus surgical management.  Mr. Mcdonald is not currently interested in pursuing a surgical intervention.   He was advised to call to return for new or worsening complaints of weakness, paresthesias, gait disturbances, or any additional concerns.  Treatment options discussed in detail with Burt and he voiced understanding.  Mr. Mcdonald agrees with this plan of care.    Tobacco abuse  The patient understands the many dangers of continuing to use tobacco. Despite this, Mr. Mcdonald states quitting is not an immediate priority at this time and declines to discuss tobacco cessation.  I reminded the patient that if quitting becomes an increased priority to contact us for help with quitting.       Overweight: 25.0-29.9kg/m2   Body mass index is 26.11 kg/m².  Information on the DASH diet provided in the AVS.  We will continue to provided diet and exercise information with the goal of weight loss at each scheduled appointment.     ADVANCED CARE PLANNING  ACP discussion was declined by the patient. Patient does not have an advance directive, information provided.    Burt was seen today for back pain.    Diagnoses and all orders for this visit:    Spinal stenosis, lumbar region, with neurogenic claudication  -     XR Spine Lumbar 2 or 3  View; Future  -     XR Spine Lumbar 2 or 3 View; Future    Tobacco abuse    BMI 26.0-26.9,adult      Return if symptoms worsen or fail to improve.    Thank you for this Consultation and the opportunity to participate in Don's care.    Sincerely,  Jorge Noel, APRN    Level of Risk: Moderate due to: undiagnosed new problem  MDM: Moderate Complexity  (Mod = 41312, High = 61026)

## 2020-08-05 ENCOUNTER — OFFICE VISIT (OUTPATIENT)
Dept: NEUROSURGERY | Facility: CLINIC | Age: 70
End: 2020-08-05

## 2020-08-05 ENCOUNTER — HOSPITAL ENCOUNTER (OUTPATIENT)
Dept: GENERAL RADIOLOGY | Facility: HOSPITAL | Age: 70
Discharge: HOME | End: 2020-08-05
Admitting: NURSE PRACTITIONER

## 2020-08-05 VITALS — BODY MASS INDEX: 26.05 KG/M2 | WEIGHT: 182 LBS | HEIGHT: 70 IN

## 2020-08-05 DIAGNOSIS — M48.062 SPINAL STENOSIS, LUMBAR REGION, WITH NEUROGENIC CLAUDICATION: Primary | ICD-10-CM

## 2020-08-05 DIAGNOSIS — Z72.0 TOBACCO ABUSE: ICD-10-CM

## 2020-08-05 DIAGNOSIS — M48.062 SPINAL STENOSIS, LUMBAR REGION, WITH NEUROGENIC CLAUDICATION: ICD-10-CM

## 2020-08-05 PROBLEM — G89.29 CHRONIC BILATERAL LOW BACK PAIN WITHOUT SCIATICA: Status: ACTIVE | Noted: 2019-01-03

## 2020-08-05 PROBLEM — M54.16 LUMBAR RADICULOPATHY: Status: ACTIVE | Noted: 2019-01-03

## 2020-08-05 PROBLEM — M54.50 CHRONIC BILATERAL LOW BACK PAIN WITHOUT SCIATICA: Status: ACTIVE | Noted: 2019-01-03

## 2020-08-05 PROCEDURE — 72100 X-RAY EXAM L-S SPINE 2/3 VWS: CPT

## 2020-08-05 PROCEDURE — 99214 OFFICE O/P EST MOD 30 MIN: CPT | Performed by: NURSE PRACTITIONER

## 2020-08-05 RX ORDER — FENOFIBRATE 145 MG/1
145 TABLET, COATED ORAL DAILY
COMMUNITY

## 2020-08-05 RX ORDER — GABAPENTIN 600 MG/1
600 TABLET ORAL 3 TIMES DAILY
COMMUNITY

## 2020-08-05 RX ORDER — OMEPRAZOLE 20 MG/1
20 CAPSULE, DELAYED RELEASE ORAL DAILY
COMMUNITY

## 2020-08-05 NOTE — PATIENT INSTRUCTIONS
"DASH Eating Plan  DASH stands for \"Dietary Approaches to Stop Hypertension.\" The DASH eating plan is a healthy eating plan that has been shown to reduce high blood pressure (hypertension). It may also reduce your risk for type 2 diabetes, heart disease, and stroke. The DASH eating plan may also help with weight loss.  What are tips for following this plan?    General guidelines  · Avoid eating more than 2,300 mg (milligrams) of salt (sodium) a day. If you have hypertension, you may need to reduce your sodium intake to 1,500 mg a day.  · Limit alcohol intake to no more than 1 drink a day for nonpregnant women and 2 drinks a day for men. One drink equals 12 oz of beer, 5 oz of wine, or 1½ oz of hard liquor.  · Work with your health care provider to maintain a healthy body weight or to lose weight. Ask what an ideal weight is for you.  · Get at least 30 minutes of exercise that causes your heart to beat faster (aerobic exercise) most days of the week. Activities may include walking, swimming, or biking.  · Work with your health care provider or diet and nutrition specialist (dietitian) to adjust your eating plan to your individual calorie needs.  Reading food labels    · Check food labels for the amount of sodium per serving. Choose foods with less than 5 percent of the Daily Value of sodium. Generally, foods with less than 300 mg of sodium per serving fit into this eating plan.  · To find whole grains, look for the word \"whole\" as the first word in the ingredient list.  Shopping  · Buy products labeled as \"low-sodium\" or \"no salt added.\"  · Buy fresh foods. Avoid canned foods and premade or frozen meals.  Cooking  · Avoid adding salt when cooking. Use salt-free seasonings or herbs instead of table salt or sea salt. Check with your health care provider or pharmacist before using salt substitutes.  · Do not garcia foods. Cook foods using healthy methods such as baking, boiling, grilling, and broiling instead.  · Cook with " heart-healthy oils, such as olive, canola, soybean, or sunflower oil.  Meal planning  · Eat a balanced diet that includes:  ? 5 or more servings of fruits and vegetables each day. At each meal, try to fill half of your plate with fruits and vegetables.  ? Up to 6-8 servings of whole grains each day.  ? Less than 6 oz of lean meat, poultry, or fish each day. A 3-oz serving of meat is about the same size as a deck of cards. One egg equals 1 oz.  ? 2 servings of low-fat dairy each day.  ? A serving of nuts, seeds, or beans 5 times each week.  ? Heart-healthy fats. Healthy fats called Omega-3 fatty acids are found in foods such as flaxseeds and coldwater fish, like sardines, salmon, and mackerel.  · Limit how much you eat of the following:  ? Canned or prepackaged foods.  ? Food that is high in trans fat, such as fried foods.  ? Food that is high in saturated fat, such as fatty meat.  ? Sweets, desserts, sugary drinks, and other foods with added sugar.  ? Full-fat dairy products.  · Do not salt foods before eating.  · Try to eat at least 2 vegetarian meals each week.  · Eat more home-cooked food and less restaurant, buffet, and fast food.  · When eating at a restaurant, ask that your food be prepared with less salt or no salt, if possible.  What foods are recommended?  The items listed may not be a complete list. Talk with your dietitian about what dietary choices are best for you.  Grains  Whole-grain or whole-wheat bread. Whole-grain or whole-wheat pasta. Brown rice. Oatmeal. Quinoa. Bulgur. Whole-grain and low-sodium cereals. Yany bread. Low-fat, low-sodium crackers. Whole-wheat flour tortillas.  Vegetables  Fresh or frozen vegetables (raw, steamed, roasted, or grilled). Low-sodium or reduced-sodium tomato and vegetable juice. Low-sodium or reduced-sodium tomato sauce and tomato paste. Low-sodium or reduced-sodium canned vegetables.  Fruits  All fresh, dried, or frozen fruit. Canned fruit in natural juice (without  added sugar).  Meat and other protein foods  Skinless chicken or turkey. Ground chicken or turkey. Pork with fat trimmed off. Fish and seafood. Egg whites. Dried beans, peas, or lentils. Unsalted nuts, nut butters, and seeds. Unsalted canned beans. Lean cuts of beef with fat trimmed off. Low-sodium, lean deli meat.  Dairy  Low-fat (1%) or fat-free (skim) milk. Fat-free, low-fat, or reduced-fat cheeses. Nonfat, low-sodium ricotta or cottage cheese. Low-fat or nonfat yogurt. Low-fat, low-sodium cheese.  Fats and oils  Soft margarine without trans fats. Vegetable oil. Low-fat, reduced-fat, or light mayonnaise and salad dressings (reduced-sodium). Canola, safflower, olive, soybean, and sunflower oils. Avocado.  Seasoning and other foods  Herbs. Spices. Seasoning mixes without salt. Unsalted popcorn and pretzels. Fat-free sweets.  What foods are not recommended?  The items listed may not be a complete list. Talk with your dietitian about what dietary choices are best for you.  Grains  Baked goods made with fat, such as croissants, muffins, or some breads. Dry pasta or rice meal packs.  Vegetables  Creamed or fried vegetables. Vegetables in a cheese sauce. Regular canned vegetables (not low-sodium or reduced-sodium). Regular canned tomato sauce and paste (not low-sodium or reduced-sodium). Regular tomato and vegetable juice (not low-sodium or reduced-sodium). Pickles. Olives.  Fruits  Canned fruit in a light or heavy syrup. Fried fruit. Fruit in cream or butter sauce.  Meat and other protein foods  Fatty cuts of meat. Ribs. Fried meat. Estrella. Sausage. Bologna and other processed lunch meats. Salami. Fatback. Hotdogs. Bratwurst. Salted nuts and seeds. Canned beans with added salt. Canned or smoked fish. Whole eggs or egg yolks. Chicken or turkey with skin.  Dairy  Whole or 2% milk, cream, and half-and-half. Whole or full-fat cream cheese. Whole-fat or sweetened yogurt. Full-fat cheese. Nondairy creamers. Whipped toppings.  Processed cheese and cheese spreads.  Fats and oils  Butter. Stick margarine. Lard. Shortening. Ghee. Estrella fat. Tropical oils, such as coconut, palm kernel, or palm oil.  Seasoning and other foods  Salted popcorn and pretzels. Onion salt, garlic salt, seasoned salt, table salt, and sea salt. Worcestershire sauce. Tartar sauce. Barbecue sauce. Teriyaki sauce. Soy sauce, including reduced-sodium. Steak sauce. Canned and packaged gravies. Fish sauce. Oyster sauce. Cocktail sauce. Horseradish that you find on the shelf. Ketchup. Mustard. Meat flavorings and tenderizers. Bouillon cubes. Hot sauce and Tabasco sauce. Premade or packaged marinades. Premade or packaged taco seasonings. Relishes. Regular salad dressings.  Where to find more information:  · National Heart, Lung, and Blood [place]: www.nhlbi.nih.gov  · American Heart Association: www.heart.org  Summary  · The DASH eating plan is a healthy eating plan that has been shown to reduce high blood pressure (hypertension). It may also reduce your risk for type 2 diabetes, heart disease, and stroke.  · With the DASH eating plan, you should limit salt (sodium) intake to 2,300 mg a day. If you have hypertension, you may need to reduce your sodium intake to 1,500 mg a day.  · When on the DASH eating plan, aim to eat more fresh fruits and vegetables, whole grains, lean proteins, low-fat dairy, and heart-healthy fats.  · Work with your health care provider or diet and nutrition specialist (dietitian) to adjust your eating plan to your individual calorie needs.  This information is not intended to replace advice given to you by your health care provider. Make sure you discuss any questions you have with your health care provider.  Document Released: 12/06/2012 Document Revised: 11/30/2018 Document Reviewed: 12/11/2017  Elsevier Patient Education © 2020 [employer]      Smoking Tobacco Information, Adult  Smoking tobacco can be harmful to your health. Tobacco contains a  poisonous (toxic), colorless chemical called nicotine. Nicotine is addictive. It changes the brain and can make it hard to stop smoking. Tobacco also has other toxic chemicals that can hurt your body and raise your risk of many cancers.  How can smoking tobacco affect me?  Smoking tobacco puts you at risk for:  · Cancer. Smoking is most commonly associated with lung cancer, but can also lead to cancer in other parts of the body.  · Chronic obstructive pulmonary disease (COPD). This is a long-term lung condition that makes it hard to breathe. It also gets worse over time.  · High blood pressure (hypertension), heart disease, stroke, or heart attack.  · Lung infections, such as pneumonia.  · Cataracts. This is when the lenses in the eyes become clouded.  · Digestive problems. This may include peptic ulcers, heartburn, and gastroesophageal reflux disease (GERD).  · Oral health problems, such as gum disease and tooth loss.  · Loss of taste and smell.  Smoking can affect your appearance by causing:  · Wrinkles.  · Yellow or stained teeth, fingers, and fingernails.  Smoking tobacco can also affect your social life, because:  · It may be challenging to find places to smoke when away from home. Many workplaces, restaurants, hotels, and public places are tobacco-free.  · Smoking is expensive. This is due to the cost of tobacco and the long-term costs of treating health problems from smoking.  · Secondhand smoke may affect those around you. Secondhand smoke can cause lung cancer, breathing problems, and heart disease. Children of smokers have a higher risk for:  ? Sudden infant death syndrome (SIDS).  ? Ear infections.  ? Lung infections.  If you currently smoke tobacco, quitting now can help you:  · Lead a longer and healthier life.  · Look, smell, breathe, and feel better over time.  · Save money.  · Protect others from the harms of secondhand smoke.  What actions can I take to prevent health problems?  Quit smoking    · Do  not start smoking. Quit if you already do.  · Make a plan to quit smoking and commit to it. Look for programs to help you and ask your health care provider for recommendations and ideas.  · Set a date and write down all the reasons you want to quit.  · Let your friends and family know you are quitting so they can help and support you. Consider finding friends who also want to quit. It can be easier to quit with someone else, so that you can support each other.  · Talk with your health care provider about using nicotine replacement medicines to help you quit, such as gum, lozenges, patches, sprays, or pills.  · Do not replace cigarette smoking with electronic cigarettes, which are commonly called e-cigarettes. The safety of e-cigarettes is not known, and some may contain harmful chemicals.  · If you try to quit but return to smoking, stay positive. It is common to slip up when you first quit, so take it one day at a time.  · Be prepared for cravings. When you feel the urge to smoke, chew gum or suck on hard candy.  Lifestyle  · Stay busy and take care of your body.  · Drink enough fluid to keep your urine pale yellow.  · Get plenty of exercise and eat a healthy diet. This can help prevent weight gain after quitting.  · Monitor your eating habits. Quitting smoking can cause you to have a larger appetite than when you smoke.  · Find ways to relax. Go out with friends or family to a movie or a restaurant where people do not smoke.  · Ask your health care provider about having regular tests (screenings) to check for cancer. This may include blood tests, imaging tests, and other tests.  · Find ways to manage your stress, such as meditation, yoga, or exercise.  Where to find support  To get support to quit smoking, consider:  · Asking your health care provider for more information and resources.  · Taking classes to learn more about quitting smoking.  · Looking for local organizations that offer resources about quitting  smoking.  · Joining a support group for people who want to quit smoking in your local community.  · Calling the smokefree.gov counselor helpline: 1-800-Quit-Now (1-643.567.3232)  Where to find more information  You may find more information about quitting smoking from:  · HelpGuide.org: www.helpguide.org  · Smokefree.gov: smokefree.gov  · American Lung Association: www.lung.org  Contact a health care provider if you:  · Have problems breathing.  · Notice that your lips, nose, or fingers turn blue.  · Have chest pain.  · Are coughing up blood.  · Feel faint or you pass out.  · Have other health changes that cause you to worry.  Summary  · Smoking tobacco can negatively affect your health, the health of those around you, your finances, and your social life.  · Do not start smoking. Quit if you already do. If you need help quitting, ask your health care provider.  · Think about joining a support group for people who want to quit smoking in your local community. There are many effective programs that will help you to quit this behavior.  This information is not intended to replace advice given to you by your health care provider. Make sure you discuss any questions you have with your health care provider.  Document Released: 01/02/2018 Document Revised: 02/06/2019 Document Reviewed: 01/02/2018  Elsevier Patient Education © 2020 [employer]      Advance Care Planning and Advance Directives     You make decisions on a daily basis - decisions about where you want to live, your career, your home, your life. Perhaps one of the most important decisions you face is your choice for future medical care. Take time to talk with your family and your healthcare team and start planning today.  Advance Care Planning is a process that can help you:  · Understand possible future healthcare decisions in light of your own experiences  · Reflect on those decision in light of your goals and values  · Discuss your decisions with those  closest to you and the healthcare professionals that care for you  · Make a plan by creating a document that reflects your wishes    Surrogate Decision Maker  In the event of a medical emergency, which has left you unable to communicate or to make your own decisions, you would need someone to make decisions for you.  It is important to discuss your preferences for medical treatment with this person while you are in good health.     Qualities of a surrogate decision maker:  • Willing to take on this role and responsibility  • Knows what you want for future medical care  • Willing to follow your wishes even if they don't agree with them  • Able to make difficult medical decisions under stressful circumstances    Advance Directives  These are legal documents you can create that will guide your healthcare team and decision maker(s) when needed. These documents can be stored in the electronic medical record.    · Living Will - a legal document to guide your care if you have a terminal condition or a serious illness and are unable to communicate. States vary by statute in document names/types, but most forms may include one or more of the following:        -  Directions regarding life-prolonging treatments        -  Directions regarding artificially provided nutrition/hydration        -  Choosing a healthcare decision maker        -  Direction regarding organ/tissue donation    · Durable Power of  for Healthcare - this document names an -in-fact to make medical decisions for you, but it may also allow this person to make personal and financial decisions for you. Please seek the advice of an  if you need this type of document.    **Advance Directives are not required and no one may discriminate against you if you do not sign one.    Medical Orders  Many states allow specific forms/orders signed by your physician to record your wishes for medical treatment in your current state of health. This form,  signed in personal communication with your physician, addresses resuscitation and other medical interventions that you may or may not want.      For more information or to schedule a time with a [facility] Advance Care Planning Facilitator contact: [facility]/ACP or call [phone] and someone will contact you directly.

## 2021-04-23 ENCOUNTER — TRANSCRIBE ORDERS (OUTPATIENT)
Dept: ADMINISTRATIVE | Facility: HOSPITAL | Age: 71
End: 2021-04-23

## 2021-04-23 DIAGNOSIS — I49.9 IRREGULAR HEART BEAT: Primary | ICD-10-CM

## 2021-04-23 DIAGNOSIS — R94.31 ABNORMAL EKG: ICD-10-CM

## 2021-04-23 DIAGNOSIS — I48.91 NEW ONSET A-FIB (HCC): ICD-10-CM

## 2021-04-27 ENCOUNTER — HOSPITAL ENCOUNTER (OUTPATIENT)
Dept: CARDIOLOGY | Facility: HOSPITAL | Age: 71
Discharge: HOME | End: 2021-04-27

## 2021-04-27 DIAGNOSIS — I49.9 IRREGULAR HEART BEAT: ICD-10-CM

## 2021-05-17 ENCOUNTER — TRANSCRIBE ORDERS (OUTPATIENT)
Dept: ADMINISTRATIVE | Facility: HOSPITAL | Age: 71
End: 2021-05-17

## 2021-05-17 DIAGNOSIS — J61 ASBESTOSIS (HCC): Primary | ICD-10-CM

## 2021-05-20 ENCOUNTER — HOSPITAL ENCOUNTER (OUTPATIENT)
Dept: CARDIOLOGY | Facility: HOSPITAL | Age: 71
Discharge: HOME | End: 2021-05-20
Admitting: FAMILY MEDICINE

## 2021-05-20 VITALS
HEIGHT: 70 IN | WEIGHT: 182 LBS | SYSTOLIC BLOOD PRESSURE: 123 MMHG | DIASTOLIC BLOOD PRESSURE: 65 MMHG | BODY MASS INDEX: 26.05 KG/M2

## 2021-05-20 DIAGNOSIS — I48.91 NEW ONSET A-FIB (HCC): ICD-10-CM

## 2021-05-20 DIAGNOSIS — I49.9 IRREGULAR HEART BEAT: ICD-10-CM

## 2021-05-20 DIAGNOSIS — R94.31 ABNORMAL EKG: ICD-10-CM

## 2021-05-20 LAB
AORTIC DIMENSIONLESS INDEX: 0.8 (DI)
BH CV ECHO MEAS - AO MAX PG (FULL): 1.6 MMHG
BH CV ECHO MEAS - AO MAX PG: 4.2 MMHG
BH CV ECHO MEAS - AO MEAN PG (FULL): 2 MMHG
BH CV ECHO MEAS - AO MEAN PG: 3 MMHG
BH CV ECHO MEAS - AO ROOT AREA (BSA CORRECTED): 1.9
BH CV ECHO MEAS - AO ROOT AREA: 11.9 CM^2
BH CV ECHO MEAS - AO ROOT DIAM: 3.9 CM
BH CV ECHO MEAS - AO V2 MAX: 103 CM/SEC
BH CV ECHO MEAS - AO V2 MEAN: 77.7 CM/SEC
BH CV ECHO MEAS - AO V2 VTI: 18.7 CM
BH CV ECHO MEAS - ASC AORTA: 3.3 CM
BH CV ECHO MEAS - AVA(I,A): 2.8 CM^2
BH CV ECHO MEAS - AVA(I,D): 2.8 CM^2
BH CV ECHO MEAS - AVA(V,A): 2.8 CM^2
BH CV ECHO MEAS - AVA(V,D): 2.8 CM^2
BH CV ECHO MEAS - BSA(HAYCOCK): 2 M^2
BH CV ECHO MEAS - BSA: 2 M^2
BH CV ECHO MEAS - BZI_BMI: 26.1 KILOGRAMS/M^2
BH CV ECHO MEAS - BZI_METRIC_HEIGHT: 177.8 CM
BH CV ECHO MEAS - BZI_METRIC_WEIGHT: 82.6 KG
BH CV ECHO MEAS - EDV(CUBED): 148 ML
BH CV ECHO MEAS - EDV(MOD-SP2): 71.4 ML
BH CV ECHO MEAS - EDV(MOD-SP4): 75.8 ML
BH CV ECHO MEAS - EDV(TEICH): 134.8 ML
BH CV ECHO MEAS - EF(CUBED): 77.9 %
BH CV ECHO MEAS - EF(MOD-SP2): 48.7 %
BH CV ECHO MEAS - EF(MOD-SP4): 51.6 %
BH CV ECHO MEAS - EF(TEICH): 69.6 %
BH CV ECHO MEAS - ESV(CUBED): 32.8 ML
BH CV ECHO MEAS - ESV(MOD-SP2): 36.6 ML
BH CV ECHO MEAS - ESV(MOD-SP4): 36.7 ML
BH CV ECHO MEAS - ESV(TEICH): 41 ML
BH CV ECHO MEAS - FS: 39.5 %
BH CV ECHO MEAS - IVS/LVPW: 1.2
BH CV ECHO MEAS - IVSD: 1.2 CM
BH CV ECHO MEAS - LAT PEAK E' VEL: 12.8 CM/SEC
BH CV ECHO MEAS - LV DIASTOLIC VOL/BSA (35-75): 37.8 ML/M^2
BH CV ECHO MEAS - LV MASS(C)D: 215.1 GRAMS
BH CV ECHO MEAS - LV MASS(C)DI: 107.3 GRAMS/M^2
BH CV ECHO MEAS - LV MAX PG: 2.7 MMHG
BH CV ECHO MEAS - LV MEAN PG: 1 MMHG
BH CV ECHO MEAS - LV SYSTOLIC VOL/BSA (12-30): 18.3 ML/M^2
BH CV ECHO MEAS - LV V1 MAX: 82 CM/SEC
BH CV ECHO MEAS - LV V1 MEAN: 54.8 CM/SEC
BH CV ECHO MEAS - LV V1 VTI: 14.9 CM
BH CV ECHO MEAS - LVIDD: 5.3 CM
BH CV ECHO MEAS - LVIDS: 3.2 CM
BH CV ECHO MEAS - LVLD AP2: 7.5 CM
BH CV ECHO MEAS - LVLD AP4: 7.8 CM
BH CV ECHO MEAS - LVLS AP2: 6.7 CM
BH CV ECHO MEAS - LVLS AP4: 6.9 CM
BH CV ECHO MEAS - LVOT AREA (M): 3.5 CM^2
BH CV ECHO MEAS - LVOT AREA: 3.5 CM^2
BH CV ECHO MEAS - LVOT DIAM: 2.1 CM
BH CV ECHO MEAS - LVPWD: 0.95 CM
BH CV ECHO MEAS - MED PEAK E' VEL: 10.7 CM/SEC
BH CV ECHO MEAS - MV DEC SLOPE: 383 CM/SEC^2
BH CV ECHO MEAS - MV DEC TIME: 153 SEC
BH CV ECHO MEAS - MV E MAX VEL: 93.6 CM/SEC
BH CV ECHO MEAS - MV MAX PG: 3.6 MMHG
BH CV ECHO MEAS - MV MEAN PG: 1 MMHG
BH CV ECHO MEAS - MV P1/2T MAX VEL: 91.7 CM/SEC
BH CV ECHO MEAS - MV P1/2T: 70.1 MSEC
BH CV ECHO MEAS - MV V2 MAX: 94.9 CM/SEC
BH CV ECHO MEAS - MV V2 MEAN: 52 CM/SEC
BH CV ECHO MEAS - MV V2 VTI: 24.8 CM
BH CV ECHO MEAS - MVA P1/2T LCG: 2.4 CM^2
BH CV ECHO MEAS - MVA(P1/2T): 3.1 CM^2
BH CV ECHO MEAS - MVA(VTI): 2.1 CM^2
BH CV ECHO MEAS - RAP SYSTOLE: 10 MMHG
BH CV ECHO MEAS - RVSP: 29 MMHG
BH CV ECHO MEAS - SI(AO): 111.4 ML/M^2
BH CV ECHO MEAS - SI(CUBED): 57.5 ML/M^2
BH CV ECHO MEAS - SI(LVOT): 25.7 ML/M^2
BH CV ECHO MEAS - SI(MOD-SP2): 17.4 ML/M^2
BH CV ECHO MEAS - SI(MOD-SP4): 19.5 ML/M^2
BH CV ECHO MEAS - SI(TEICH): 46.8 ML/M^2
BH CV ECHO MEAS - SV(AO): 223.4 ML
BH CV ECHO MEAS - SV(CUBED): 115.3 ML
BH CV ECHO MEAS - SV(LVOT): 51.6 ML
BH CV ECHO MEAS - SV(MOD-SP2): 34.8 ML
BH CV ECHO MEAS - SV(MOD-SP4): 39.1 ML
BH CV ECHO MEAS - SV(TEICH): 93.8 ML
BH CV ECHO MEAS - TAPSE (>1.6): 1.7 CM
BH CV ECHO MEAS - TR MAX VEL: 217 CM/SEC
BH CV ECHO MEASUREMENTS AVERAGE E/E' RATIO: 7.97
BH CV XLRA - RV BASE: 3.9 CM
BH CV XLRA - TDI S': 10.1 CM/SEC
LEFT ATRIUM VOLUME INDEX: 30 ML/M2
MAXIMAL PREDICTED HEART RATE: 150 BPM
STRESS TARGET HR: 128 BPM

## 2021-05-20 PROCEDURE — 93306 TTE W/DOPPLER COMPLETE: CPT

## 2021-05-20 PROCEDURE — 93306 TTE W/DOPPLER COMPLETE: CPT | Performed by: INTERNAL MEDICINE

## 2021-05-24 ENCOUNTER — OFFICE VISIT (OUTPATIENT)
Dept: CARDIOLOGY | Facility: CLINIC | Age: 71
End: 2021-05-24

## 2021-05-24 VITALS
BODY MASS INDEX: 26.48 KG/M2 | HEART RATE: 108 BPM | DIASTOLIC BLOOD PRESSURE: 76 MMHG | HEIGHT: 70 IN | OXYGEN SATURATION: 98 % | SYSTOLIC BLOOD PRESSURE: 118 MMHG | WEIGHT: 185 LBS

## 2021-05-24 DIAGNOSIS — Z72.0 TOBACCO ABUSE: ICD-10-CM

## 2021-05-24 DIAGNOSIS — I48.19 PERSISTENT ATRIAL FIBRILLATION (HCC): Primary | ICD-10-CM

## 2021-05-24 DIAGNOSIS — I10 ESSENTIAL HYPERTENSION: ICD-10-CM

## 2021-05-24 PROCEDURE — 99204 OFFICE O/P NEW MOD 45 MIN: CPT | Performed by: INTERNAL MEDICINE

## 2021-05-24 PROCEDURE — 93000 ELECTROCARDIOGRAM COMPLETE: CPT | Performed by: INTERNAL MEDICINE

## 2021-05-24 RX ORDER — SODIUM CHLORIDE 9 MG/ML
1-3 INJECTION, SOLUTION INTRAVENOUS CONTINUOUS
Status: CANCELLED | OUTPATIENT
Start: 2021-05-24

## 2021-05-24 RX ORDER — LIDOCAINE HYDROCHLORIDE 10 MG/ML
0.1 INJECTION, SOLUTION EPIDURAL; INFILTRATION; INTRACAUDAL; PERINEURAL ONCE AS NEEDED
Status: CANCELLED | OUTPATIENT
Start: 2021-05-24

## 2021-05-24 NOTE — PROGRESS NOTES
Subjective:     Encounter Date:05/24/2021      Patient ID: Burt Mcdonald is a 70 y.o. male with a history of hypertension and COPD, referred here for further evaluation after recently diagnosing atrial fibrillation.    Referring Provider: Arjun Roman MD  Reason for Referral: Atrial fibrillation    Chief Complaint: Here to establish care - atrial fibrillation    This is a 70-year-old male who presents today for further evaluation of atrial fibrillation per the patient says that he was recently diagnosed with atrial fibrillation on a routine office visit.  He says that he is not having any symptoms related atrial fibrillation, particularly no significant palpitations, lightheadedness, dizziness, syncope, chest pain.  He notes that he has some chronic shortness of breath and dyspnea on exertion but also is a smoker with COPD.  Occasional cough and wheezing.  The symptoms are all described as being stable at this time and not worsened since his diagnosis of atrial fibrillation.  He was started on Xarelto.  He is tolerating this well but does note some easy bruising but no significant bleeding.  He does have a history of hypertension.  His blood pressure has been well controlled.  He notes that he has a burning sensation in his feet and does take Neurontin for this.  This seems to help.  He does not have any history of diabetes.  No prior history of stroke.  No history of coronary artery disease or congestive heart failure.    Atrial Fibrillation  Presents for initial visit. Symptoms include shortness of breath. Symptoms are negative for chest pain, dizziness, palpitations and syncope. The symptoms have been stable. Past treatments include anticoagulant. Compliance with prior treatments has been good. Past medical history includes atrial fibrillation and HTN.   Hypertension  This is a chronic problem. The problem is unchanged. The problem is controlled. Associated symptoms include shortness of breath.  Pertinent negatives include no blurred vision, chest pain, headaches, neck pain, orthopnea, palpitations, peripheral edema or PND. There are no associated agents to hypertension. Risk factors for coronary artery disease include male gender and smoking/tobacco exposure. Current antihypertension treatment includes calcium channel blockers and ACE inhibitors. The current treatment provides significant improvement. There are no compliance problems.  There is no history of kidney disease, CAD/MI, CVA, heart failure or PVD.       The following portions of the patient's history were reviewed and updated as appropriate: allergies, current medications, past family history, past medical history, past social history, past surgical history and problem list.     Past Medical History:   Diagnosis Date   • A-fib (CMS/HCC)    • COPD (chronic obstructive pulmonary disease) (CMS/HCC)    • Elevated PSA    • Hypercholesteremia    • Hypertension      Past Surgical History:   Procedure Laterality Date   • BACK SURGERY     • EYE SURGERY  01/2016    Bilateral cataract       Current Outpatient Medications:   •  amLODIPine-benazepril (LOTREL) 10-40 MG per capsule, Take 1 capsule by mouth Daily., Disp: , Rfl:   •  fenofibrate (TRICOR) 145 MG tablet, Take 145 mg by mouth Daily., Disp: , Rfl:   •  gabapentin (NEURONTIN) 600 MG tablet, Take 600 mg by mouth 3 (Three) Times a Day., Disp: , Rfl:   •  HYDROcodone-acetaminophen (NORCO)  MG per tablet, Take 1 tablet by mouth Every 8 (Eight) Hours As Needed., Disp: , Rfl:   •  omeprazole (priLOSEC) 20 MG capsule, Take 20 mg by mouth Daily., Disp: , Rfl:   •  rivaroxaban (XARELTO) 20 MG tablet, Take 20 mg by mouth Daily., Disp: , Rfl:   •  STIOLTO RESPIMAT 2.5-2.5 MCG/ACT aerosol solution, Inhale 2 puffs Daily., Disp: , Rfl:   •  tamsulosin (FLOMAX) 0.4 MG capsule 24 hr capsule, Take 1 capsule by mouth Daily., Disp: , Rfl:   •  triazolam (HALCION) 0.25 MG tablet, Take 0.25 mg by mouth At Night As  Needed., Disp: , Rfl:   •  VENTOLIN  (90 BASE) MCG/ACT inhaler, Inhale 2 puffs Every 4 (Four) Hours As Needed., Disp: , Rfl:     No Known Allergies    Social History     Tobacco Use   • Smoking status: Current Every Day Smoker     Packs/day: 1.00     Types: Cigarettes   • Smokeless tobacco: Current User     Types: Chew   Substance Use Topics   • Alcohol use: Yes     Alcohol/week: 3.0 standard drinks     Types: 2 Cans of beer, 1 Shots of liquor per week     Comment: daily     Family History   Problem Relation Age of Onset   • Cancer Father         LUNG   • Cancer Mother         LUNG   • No Known Problems Sister    • No Known Problems Brother    • No Known Problems Brother    • No Known Problems Brother      Review of Systems   Constitutional: Negative for chills, fever and weight loss.   HENT: Negative for congestion and hearing loss.    Eyes: Negative for blurred vision and pain.   Cardiovascular: Positive for dyspnea on exertion. Negative for chest pain, leg swelling, orthopnea, palpitations, paroxysmal nocturnal dyspnea and syncope.   Respiratory: Positive for cough, shortness of breath and wheezing. Negative for hemoptysis.    Endocrine: Negative for cold intolerance and heat intolerance.   Hematologic/Lymphatic: Negative for adenopathy and bleeding problem.   Skin: Negative for color change, poor wound healing and rash.   Musculoskeletal: Positive for arthritis. Negative for myalgias and neck pain.   Gastrointestinal: Negative for abdominal pain, change in bowel habit, heartburn, hematemesis, hematochezia, melena, nausea and vomiting.   Genitourinary: Negative for dysuria, frequency and hematuria.   Neurological: Positive for numbness. Negative for dizziness, focal weakness, headaches, light-headedness and loss of balance.   Psychiatric/Behavioral: Negative for altered mental status, memory loss and substance abuse.   Allergic/Immunologic: Negative for hives and persistent infections.         ECG 12  Lead    Date/Time: 5/24/2021 2:55 PM  Performed by: Nick Snider MD  Authorized by: Nick Snider MD   Comparison: compared with previous ECG from 4/22/2021  Similar to previous ECG  Rhythm: atrial fibrillation  Rate: normal  BPM: 96  Conduction: conduction normal  QRS axis: normal  Other findings: non-specific ST-T wave changes    Clinical impression: abnormal EKG             Objective:     Vitals reviewed.   Constitutional:       General: Not in acute distress.     Appearance: Normal and healthy appearance. Well-developed. Not toxic-appearing or diaphoretic.   Eyes:      General: Lids are normal.      Extraocular Movements: Extraocular movements intact.      Pupils: Pupils are equal, round, and reactive to light.   HENT:      Head: Normocephalic and atraumatic.      Right Ear: External ear normal.      Left Ear: External ear normal.      Nose: Nose normal.    Mouth/Throat:      Mouth: Mucous membranes are not pale, not dry and not cyanotic.   Neck:      Thyroid: No thyroid mass or thyromegaly.      Vascular: No carotid bruit, hepatojugular reflux or JVD.      Trachea: No tracheal deviation.      Lymphadenopathy: No cervical adenopathy.   Pulmonary:      Effort: Pulmonary effort is normal. No accessory muscle usage or respiratory distress.      Breath sounds: Normal breath sounds. No wheezing. No rhonchi. No rales.   Chest:      Chest wall: Not tender to palpatation.   Cardiovascular:      Normal rate. Irregularly irregular rhythm.      Murmurs: There is no murmur.      No gallop.   Pulses:     Intact distal pulses.   Edema:     Peripheral edema absent.   Abdominal:      General: Bowel sounds are normal. There is no distension or abdominal bruit.      Palpations: Abdomen is soft. There is no pulsatile midline mass.      Tenderness: There is no abdominal tenderness.   Musculoskeletal: Normal range of motion.         General: No tenderness or deformity.      Extremities: No clubbing present.      "Cervical back: Normal range of motion and neck supple. No edema. Skin:     General: Skin is warm and dry. There is no cyanosis.      Findings: No erythema or rash.   Neurological:      General: No focal deficit present.      Mental Status: Oriented to person, place, and time and oriented to person, place and time.      Cranial Nerves: No cranial nerve deficit.   Psychiatric:         Attention and Perception: Attention normal.         Mood and Affect: Mood normal.         Speech: Speech normal.         Behavior: Behavior normal. Behavior is cooperative.         Thought Content: Thought content normal.       /76   Pulse 108   Ht 177.8 cm (70\")   Wt 83.9 kg (185 lb)   SpO2 98%   BMI 26.54 kg/m²     Data/Lab Review:     Echocardiogram 5/20/21:  · Left ventricular systolic function is normal. Left ventricular ejection fraction appears to be 56 - 60%.  · Left ventricular wall thickness is consistent with mild concentric hypertrophy.  · Normal right ventricular cavity size and systolic function noted.  · Mild tricuspid valve regurgitation is present. Estimated right ventricular systolic pressure from tricuspid regurgitation is normal (<35 mmHg).        Assessment:          Diagnosis Plan   1. Persistent atrial fibrillation (CMS/HCC)  Cardioversion External in Cardiology Department    lidocaine PF 1% (XYLOCAINE) injection 0.1 mL    sodium chloride 0.9 % infusion    ECG 12 Lead   2. Essential hypertension     3. Tobacco abuse            Plan:       1.  Persistent atrial fibrillation: The chronicity of the patient's atrial fibrillation is unknown.  He is not having any significant symptoms at this time.  He does have a history of hypertension and is aged 70.  He is appropriately anticoagulated with Xarelto at this time.  He is taking this with the evening meal.  He has not missed a dose in the last 30 days.  Therefore, I think that it would be reasonable to try to restore sinus rhythm at this time.  He did wear a " patch monitor, however the results of the study are not currently available.  I guess that there could be some concern that he would be going in and out of atrial fibrillation, however the assumption is that he has probably been persistently in atrial fibrillation for quite some time.  Certainly, if his cardiac monitor results in the near future and shows that he is having paroxysmal atrial fibrillation episodes, it may be worth trying him on an antiarrhythmic and then bring him back for a cardioversion if he remains in atrial fibrillation or placing him on an antiarrhythmic and using another cardiac monitor to see if this helps him to maintain sinus rhythm.  In any event, we will try to set him up for a cardioversion in the near future.    CHADS-VASc Risk Assessment            2 Total Score    1 Hypertension    1 Age 65-74        Criteria that do not apply:    CHF    Age >/= 75    DM    PRIOR STROKE/TIA/THROMBO    Vascular Disease    Sex: Female        2.  Essential hypertension: Blood pressure is currently well controlled.  Continue current medications.    3.  Tobacco abuse: Burt Mcdonald  reports that he has been smoking cigarettes. He has been smoking about 1.00 pack per day. His smokeless tobacco use includes chew.. I have educated him on the risk of diseases from using tobacco products such as COPD and heart disease. I advised him to quit and he is not willing to quit. I spent 3  minutes counseling the patient.    Follow-up will be pending the results of the patient's cardioversion.

## 2021-05-25 ENCOUNTER — TRANSCRIBE ORDERS (OUTPATIENT)
Dept: LAB | Facility: HOSPITAL | Age: 71
End: 2021-05-25

## 2021-05-25 DIAGNOSIS — Z01.818 PREOPERATIVE TESTING: Primary | ICD-10-CM

## 2021-05-30 LAB
MAXIMAL PREDICTED HEART RATE: 150 BPM
STRESS TARGET HR: 128 BPM

## 2021-05-31 ENCOUNTER — LAB (OUTPATIENT)
Dept: LAB | Facility: HOSPITAL | Age: 71
End: 2021-05-31

## 2021-05-31 LAB — SARS-COV-2 ORF1AB RESP QL NAA+PROBE: NOT DETECTED

## 2021-05-31 PROCEDURE — C9803 HOPD COVID-19 SPEC COLLECT: HCPCS | Performed by: INTERNAL MEDICINE

## 2021-05-31 PROCEDURE — U0004 COV-19 TEST NON-CDC HGH THRU: HCPCS | Performed by: INTERNAL MEDICINE

## 2021-05-31 PROCEDURE — U0005 INFEC AGEN DETEC AMPLI PROBE: HCPCS | Performed by: INTERNAL MEDICINE

## 2021-06-02 ENCOUNTER — ANESTHESIA (OUTPATIENT)
Dept: CARDIOLOGY | Facility: HOSPITAL | Age: 71
End: 2021-06-02

## 2021-06-02 ENCOUNTER — ANESTHESIA EVENT (OUTPATIENT)
Dept: CARDIOLOGY | Facility: HOSPITAL | Age: 71
End: 2021-06-02

## 2021-06-02 ENCOUNTER — HOSPITAL ENCOUNTER (OUTPATIENT)
Dept: CARDIOLOGY | Facility: HOSPITAL | Age: 71
Setting detail: HOSPITAL OUTPATIENT SURGERY
Discharge: HOME | End: 2021-06-02

## 2021-06-02 VITALS
BODY MASS INDEX: 29.03 KG/M2 | WEIGHT: 184.97 LBS | DIASTOLIC BLOOD PRESSURE: 76 MMHG | OXYGEN SATURATION: 96 % | HEIGHT: 67 IN | HEART RATE: 93 BPM | RESPIRATION RATE: 15 BRPM | TEMPERATURE: 98.3 F | SYSTOLIC BLOOD PRESSURE: 137 MMHG

## 2021-06-02 VITALS — DIASTOLIC BLOOD PRESSURE: 74 MMHG | SYSTOLIC BLOOD PRESSURE: 118 MMHG | OXYGEN SATURATION: 98 % | HEART RATE: 79 BPM

## 2021-06-02 DIAGNOSIS — I48.19 PERSISTENT ATRIAL FIBRILLATION (HCC): ICD-10-CM

## 2021-06-02 PROCEDURE — 92960 CARDIOVERSION ELECTRIC EXT: CPT

## 2021-06-02 PROCEDURE — 92960 CARDIOVERSION ELECTRIC EXT: CPT | Performed by: INTERNAL MEDICINE

## 2021-06-02 PROCEDURE — 25010000002 PROPOFOL 10 MG/ML EMULSION: Performed by: NURSE ANESTHETIST, CERTIFIED REGISTERED

## 2021-06-02 RX ORDER — LIDOCAINE HYDROCHLORIDE 20 MG/ML
INJECTION, SOLUTION EPIDURAL; INFILTRATION; INTRACAUDAL; PERINEURAL AS NEEDED
Status: DISCONTINUED | OUTPATIENT
Start: 2021-06-02 | End: 2021-06-02 | Stop reason: SURG

## 2021-06-02 RX ADMIN — PROPOFOL 80 MCG/KG/MIN: 10 INJECTION, EMULSION INTRAVENOUS at 11:38

## 2021-06-02 RX ADMIN — LIDOCAINE HYDROCHLORIDE 50 MG: 20 INJECTION, SOLUTION EPIDURAL; INFILTRATION; INTRACAUDAL; PERINEURAL at 11:38

## 2021-06-02 NOTE — INTERVAL H&P NOTE
H&P reviewed. The patient was examined and there are no changes to the H&P.  The patient is agreeable to proceed with cardioversion today after the risks, benefits and alternatives were explained to him in detail.  He assures me that he has missed no doses of his oral anticoagulant therapy.

## 2021-06-02 NOTE — ANESTHESIA PREPROCEDURE EVALUATION
Anesthesia Evaluation     Patient summary reviewed and Nursing notes reviewed   no history of anesthetic complications:  NPO Solid Status: > 8 hours  NPO Liquid Status: N/A           Airway   Mallampati: I  No difficulty expected  Dental - normal exam     Pulmonary    (+) a smoker Current, COPD,   Cardiovascular   Exercise tolerance: good (4-7 METS)    ECG reviewed  PT is on anticoagulation therapy    (+) hypertension, dysrhythmias Atrial Fib, hyperlipidemia,   (-) valvular problems/murmurs, angina      Neuro/Psych- negative ROS  (-) seizures, TIA, CVA  GI/Hepatic/Renal/Endo - negative ROS   (-) liver disease, no renal disease, diabetes, no thyroid disorder    Musculoskeletal (-) negative ROS    Abdominal    Substance History - negative use     OB/GYN negative ob/gyn ROS         Other                        Anesthesia Plan    ASA 3     MAC       Anesthetic plan, all risks, benefits, and alternatives have been provided, discussed and informed consent has been obtained with: patient.

## 2021-06-02 NOTE — ANESTHESIA POSTPROCEDURE EVALUATION
"Patient: Brut Mcdonald    Procedure Summary     Date: 06/02/21 Room / Location: [facility] CATH LAB    Anesthesia Start: 1129 Anesthesia Stop: 1145    Procedure: CARDIOVERSION EXTERNAL IN CARDIOLOGY DEPARTMENT Diagnosis:       Persistent atrial fibrillation (CMS/HCC)      (atrial fibrillation)    Scheduled Providers: Nick Snider MD Provider: Jovany Salas CRNA    Anesthesia Type: MAC ASA Status: 3          Anesthesia Type: MAC    Vitals  Vitals Value Taken Time   /74 06/02/21 1143   Temp     Pulse 79 06/02/21 1145   Resp     SpO2 98 % 06/02/21 1145           Post Anesthesia Care and Evaluation    Patient location during evaluation: PHASE II  Patient participation: complete - patient participated  Level of consciousness: awake and alert  Pain management: adequate  Airway patency: patent  Anesthetic complications: No anesthetic complications    Cardiovascular status: acceptable  Respiratory status: acceptable  Hydration status: acceptable    Comments: Blood pressure 130/78, pulse 77, temperature 98.3 °F (36.8 °C), resp. rate 22, height 170.2 cm (67\"), weight 83.9 kg (184 lb 15.5 oz), SpO2 95 %.    Pt discharged from PACU based on fabienne score >8      "

## 2021-06-04 LAB
MAXIMAL PREDICTED HEART RATE: 150 BPM
STRESS TARGET HR: 128 BPM

## 2021-06-16 ENCOUNTER — TRANSCRIBE ORDERS (OUTPATIENT)
Dept: LAB | Facility: HOSPITAL | Age: 71
End: 2021-06-16

## 2021-06-16 DIAGNOSIS — Z01.818 PREOPERATIVE TESTING: Primary | ICD-10-CM

## 2021-06-22 ENCOUNTER — LAB (OUTPATIENT)
Dept: LAB | Facility: HOSPITAL | Age: 71
End: 2021-06-22

## 2021-06-22 DIAGNOSIS — Z01.818 PREOPERATIVE TESTING: ICD-10-CM

## 2021-06-22 LAB — SARS-COV-2 ORF1AB RESP QL NAA+PROBE: NOT DETECTED

## 2021-06-22 PROCEDURE — U0005 INFEC AGEN DETEC AMPLI PROBE: HCPCS

## 2021-06-22 PROCEDURE — U0004 COV-19 TEST NON-CDC HGH THRU: HCPCS

## 2021-06-22 PROCEDURE — C9803 HOPD COVID-19 SPEC COLLECT: HCPCS

## 2021-06-25 ENCOUNTER — HOSPITAL ENCOUNTER (OUTPATIENT)
Dept: PULMONOLOGY | Facility: HOSPITAL | Age: 71
Discharge: HOME | End: 2021-06-25
Admitting: INTERNAL MEDICINE

## 2021-06-25 DIAGNOSIS — J61 ASBESTOSIS (HCC): ICD-10-CM

## 2021-06-25 PROCEDURE — 94729 DIFFUSING CAPACITY: CPT

## 2021-06-25 PROCEDURE — 94010 BREATHING CAPACITY TEST: CPT | Performed by: INTERNAL MEDICINE

## 2021-06-25 PROCEDURE — 94726 PLETHYSMOGRAPHY LUNG VOLUMES: CPT

## 2021-06-25 PROCEDURE — 94729 DIFFUSING CAPACITY: CPT | Performed by: INTERNAL MEDICINE

## 2021-06-25 PROCEDURE — 94010 BREATHING CAPACITY TEST: CPT

## 2021-06-25 PROCEDURE — 94726 PLETHYSMOGRAPHY LUNG VOLUMES: CPT | Performed by: INTERNAL MEDICINE

## 2021-07-14 ENCOUNTER — OFFICE VISIT (OUTPATIENT)
Dept: CARDIOLOGY | Facility: CLINIC | Age: 71
End: 2021-07-14

## 2021-07-14 VITALS
DIASTOLIC BLOOD PRESSURE: 75 MMHG | SYSTOLIC BLOOD PRESSURE: 122 MMHG | HEART RATE: 87 BPM | HEIGHT: 67 IN | OXYGEN SATURATION: 98 % | WEIGHT: 185 LBS | RESPIRATION RATE: 18 BRPM | BODY MASS INDEX: 29.03 KG/M2

## 2021-07-14 DIAGNOSIS — I10 ESSENTIAL HYPERTENSION: Chronic | ICD-10-CM

## 2021-07-14 DIAGNOSIS — Z72.0 TOBACCO ABUSE: Chronic | ICD-10-CM

## 2021-07-14 DIAGNOSIS — I48.11 LONGSTANDING PERSISTENT ATRIAL FIBRILLATION (HCC): Primary | Chronic | ICD-10-CM

## 2021-07-14 PROCEDURE — 99214 OFFICE O/P EST MOD 30 MIN: CPT | Performed by: NURSE PRACTITIONER

## 2021-07-14 NOTE — PATIENT INSTRUCTIONS
Atrial Fibrillation    Atrial fibrillation is a type of irregular or rapid heartbeat (arrhythmia). In atrial fibrillation, the top part of the heart (atria) beats in an irregular pattern. This makes the heart unable to pump blood normally and effectively.  The goal of treatment is to prevent blood clots from forming, control your heart rate, or restore your heartbeat to a normal rhythm. If this condition is not treated, it can cause serious problems, such as a weakened heart muscle (cardiomyopathy) or a stroke.  What are the causes?  This condition is often caused by medical conditions that damage the heart's electrical system. These include:  · High blood pressure (hypertension). This is the most common cause.  · Certain heart problems or conditions, such as heart failure, coronary artery disease, heart valve problems, or heart surgery.  · Diabetes.  · Overactive thyroid (hyperthyroidism).  · Obesity.  · Chronic kidney disease.  In some cases, the cause of this condition is not known.  What increases the risk?  This condition is more likely to develop in:  · Older people.  · People who smoke.  · Athletes who do endurance exercise.  · People who have a family history of atrial fibrillation.  · Men.  · People who use drugs.  · People who drink a lot of alcohol.  · People who have lung conditions, such as emphysema, pneumonia, or COPD.  · People who have obstructive sleep apnea.  What are the signs or symptoms?  Symptoms of this condition include:  · A feeling that your heart is racing or beating irregularly.  · Discomfort or pain in your chest.  · Shortness of breath.  · Sudden light-headedness or weakness.  · Tiring easily during exercise or activity.  · Fatigue.  · Syncope (fainting).  · Sweating.  In some cases, there are no symptoms.  How is this diagnosed?  Your health care provider may detect atrial fibrillation when taking your pulse. If detected, this condition may be diagnosed with:  · An electrocardiogram  (ECG) to check electrical signals of the heart.  · An ambulatory cardiac monitor to record your heart's activity for a few days.  · A transthoracic echocardiogram (TTE) to create pictures of your heart.  · A transesophageal echocardiogram (NOLAN) to create even closer pictures of your heart.  · A stress test to check your blood supply while you exercise.  · Imaging tests, such as a CT scan or chest X-ray.  · Blood tests.  How is this treated?  Treatment depends on underlying conditions and how you feel when you experience atrial fibrillation. This condition may be treated with:  · Medicines to prevent blood clots or to treat heart rate or heart rhythm problems.  · Electrical cardioversion to reset the heart's rhythm.  · A pacemaker to correct abnormal heart rhythm.  · Ablation to remove the heart tissue that sends abnormal signals.  · Left atrial appendage closure to seal the area where blood clots can form.  In some cases, underlying conditions will be treated.  Follow these instructions at home:  Medicines  · Take over-the counter and prescription medicines only as told by your health care provider.  · Do not take any new medicines without talking to your health care provider.  · If you are taking blood thinners:  ? Talk with your health care provider before you take any medicines that contain aspirin or NSAIDs, such as ibuprofen. These medicines increase your risk for dangerous bleeding.  ? Take your medicine exactly as told, at the same time every day.  ? Avoid activities that could cause injury or bruising, and follow instructions about how to prevent falls.  ? Wear a medical alert bracelet or carry a card that lists what medicines you take.  Lifestyle         · Do not use any products that contain nicotine or tobacco, such as cigarettes, e-cigarettes, and chewing tobacco. If you need help quitting, ask your health care provider.  · Eat heart-healthy foods. Talk with a dietitian to make an eating plan that is  "right for you.  · Exercise regularly as told by your health care provider.  · Do not drink alcohol.  · Lose weight if you are overweight.  · Do not use drugs, including cannabis.  General instructions  · If you have obstructive sleep apnea, manage your condition as told by your health care provider.  · Do not use diet pills unless your health care provider approves. Diet pills can make heart problems worse.  · Keep all follow-up visits as told by your health care provider. This is important.  Contact a health care provider if you:  · Notice a change in the rate, rhythm, or strength of your heartbeat.  · Are taking a blood thinner and you notice more bruising.  · Tire more easily when you exercise or do heavy work.  · Have a sudden change in weight.  Get help right away if you have:    · Chest pain, abdominal pain, sweating, or weakness.  · Trouble breathing.  · Side effects of blood thinners, such as blood in your vomit, stool, or urine, or bleeding that cannot stop.  · Any symptoms of a stroke. \"BE FAST\" is an easy way to remember the main warning signs of a stroke:  ? B - Balance. Signs are dizziness, sudden trouble walking, or loss of balance.  ? E - Eyes. Signs are trouble seeing or a sudden change in vision.  ? F - Face. Signs are sudden weakness or numbness of the face, or the face or eyelid drooping on one side.  ? A - Arms. Signs are weakness or numbness in an arm. This happens suddenly and usually on one side of the body.  ? S - Speech. Signs are sudden trouble speaking, slurred speech, or trouble understanding what people say.  ? T - Time. Time to call emergency services. Write down what time symptoms started.  · Other signs of a stroke, such as:  ? A sudden, severe headache with no known cause.  ? Nausea or vomiting.  ? Seizure.  These symptoms may represent a serious problem that is an emergency. Do not wait to see if the symptoms will go away. Get medical help right away. Call your local emergency " services (911 in the [place] Do not drive yourself to the hospital.  Summary  · Atrial fibrillation is a type of irregular or rapid heartbeat (arrhythmia).  · Symptoms include a feeling that your heart is beating fast or irregularly.  · You may be given medicines to prevent blood clots or to treat heart rate or heart rhythm problems.  · Get help right away if you have signs or symptoms of a stroke.  · Get help right away if you cannot catch your breath or have chest pain or pressure.  This information is not intended to replace advice given to you by your health care provider. Make sure you discuss any questions you have with your health care provider.  Document Revised: 06/10/2020 Document Reviewed: 06/10/2020  [employer] Patient Education © 2021 [employer]

## 2021-07-15 PROBLEM — I48.11 LONGSTANDING PERSISTENT ATRIAL FIBRILLATION (HCC): Chronic | Status: ACTIVE | Noted: 2021-07-15

## 2021-07-15 PROBLEM — Z72.0 TOBACCO ABUSE: Chronic | Status: ACTIVE | Noted: 2018-05-22

## 2021-07-15 PROBLEM — I48.11 LONGSTANDING PERSISTENT ATRIAL FIBRILLATION (HCC): Status: ACTIVE | Noted: 2021-07-15

## 2021-07-15 PROCEDURE — 93000 ELECTROCARDIOGRAM COMPLETE: CPT | Performed by: NURSE PRACTITIONER

## 2021-07-15 NOTE — PROGRESS NOTES
Subjective:     Encounter Date:07/14/2021      Patient ID: Burt Mcdonald is a 70 y.o. male with a previous medical history of atrial fibrillation, hypertension, COPD, ongoing tobacco abuse.  He presents to the office today for follow-up after recent outpatient cardioversion.    Chief Complaint: Post cardioversion follow Up  Atrial Fibrillation  Presents for follow-up visit. Symptoms are negative for bradycardia, chest pain, dizziness, hemodynamic instability, palpitations, shortness of breath, syncope and weakness. The symptoms have been stable. Past medical history includes atrial fibrillation.     The patient was evaluated in our office on 5/24/2021 by Dr. Nick Snider.  He was referred to our office due to the finding of atrial fibrillation by his primary care physician. He had been started on anticoagulation by his PCP. Due to the new finding, unknown chronicity, the patient was set up for outpatient cardioversion. Prior to his office appointment he was placed in a Holter monitor for surveillance. He remained in atrial fibrillation throughout the entirety of the monitoring period.    On 6/2/2021 the patient had successful cardioversion. He displayed a sinus rhythm post cardioversion. He was discharged home in stable condition. He denies any changes in symptoms. He has a history of hypertension and COPD with ongoing tobacco abuse with mild dyspnea on exertion. His shortness of breath is chronic due to his lung history. He denied any other symptoms such as lightheadedness, dizziness, chest pain, palpitations. Post cardioversion the patient denied any change in his symptoms.    On follow-up today the patient's EKG demonstrates rate controlled atrial fibrillation. He denies any profound symptom changes since his cardioversion. Overall he has been stable and had no reported improvement after cardioversion. He remains anticoagulated without any complaints. He does report mild swelling to his lower extremities  at times.    The following portions of the patient's history were reviewed and updated as appropriate: allergies, current medications, past family history, past medical history, past social history and past surgical history.     No Known Allergies      Current Outpatient Medications:   •  amLODIPine-benazepril (LOTREL) 10-40 MG per capsule, Take 1 capsule by mouth Daily., Disp: , Rfl:   •  fenofibrate (TRICOR) 145 MG tablet, Take 145 mg by mouth Daily., Disp: , Rfl:   •  gabapentin (NEURONTIN) 600 MG tablet, Take 600 mg by mouth 3 (Three) Times a Day., Disp: , Rfl:   •  HYDROcodone-acetaminophen (NORCO)  MG per tablet, Take 1 tablet by mouth Every 8 (Eight) Hours As Needed., Disp: , Rfl:   •  omeprazole (priLOSEC) 20 MG capsule, Take 20 mg by mouth Daily., Disp: , Rfl:   •  rivaroxaban (XARELTO) 20 MG tablet, Take 20 mg by mouth Daily., Disp: , Rfl:   •  STIOLTO RESPIMAT 2.5-2.5 MCG/ACT aerosol solution, Inhale 2 puffs Daily., Disp: , Rfl:   •  tamsulosin (FLOMAX) 0.4 MG capsule 24 hr capsule, Take 1 capsule by mouth Daily., Disp: , Rfl:   •  triazolam (HALCION) 0.25 MG tablet, Take 0.25 mg by mouth At Night As Needed., Disp: , Rfl:   •  VENTOLIN  (90 BASE) MCG/ACT inhaler, Inhale 2 puffs Every 4 (Four) Hours As Needed., Disp: , Rfl:       Review of Systems   Constitutional: Negative for fever and malaise/fatigue.   HENT: Negative for nosebleeds.    Cardiovascular: Negative for chest pain, dyspnea on exertion, irregular heartbeat, leg swelling, near-syncope, orthopnea, palpitations, paroxysmal nocturnal dyspnea and syncope.   Respiratory: Negative for cough, shortness of breath, sputum production and wheezing.    Hematologic/Lymphatic: Negative for bleeding problem. Does not bruise/bleed easily.   Gastrointestinal: Negative for bloating, abdominal pain, nausea and vomiting.   Neurological: Negative for dizziness, focal weakness, headaches, light-headedness and weakness.   Psychiatric/Behavioral:  "Negative for altered mental status.       ECG 12 Lead    Date/Time: 7/15/2021 8:39 AM  Performed by: Christelle Mohamud APRN  Authorized by: Christelle Mohamud APRN   Comparison: compared with previous ECG from 5/24/2021  Similar to previous ECG  Rhythm: atrial fibrillation  Rate: normal  BPM: 87  Conduction: conduction normal  ST Segments: ST segments normal  T Waves: T waves normal  QRS axis: normal    Clinical impression: abnormal EKG          /75 (BP Location: Right arm, Patient Position: Sitting, Cuff Size: Adult)   Pulse 87   Resp 18   Ht 170.2 cm (67\")   Wt 83.9 kg (185 lb)   SpO2 98%   BMI 28.98 kg/m²        Objective:     Vitals reviewed.   Constitutional:       General: Not in acute distress.     Appearance: Well-developed, well-groomed and not in distress. Chronically ill-appearing. Not diaphoretic.   Eyes:      General:         Right eye: No discharge.         Left eye: No discharge.   HENT:      Head: Normocephalic and atraumatic.    Mouth/Throat:      Pharynx: No oropharyngeal exudate.   Pulmonary:      Effort: Pulmonary effort is normal. No respiratory distress.      Breath sounds: Decreased breath sounds present.   Chest:      Chest wall: Not tender to palpatation.   Cardiovascular:      Normal rate. Irregularly irregular rhythm.      Murmurs: There is no murmur.      No gallop. No rub.   Edema:     Peripheral edema absent.   Musculoskeletal: Normal range of motion.      Cervical back: Normal range of motion and neck supple. Skin:     General: Skin is warm and dry.      Coloration: Skin is not pale.   Neurological:      Mental Status: Alert, oriented to person, place, and time and oriented to person, place and time.   Psychiatric:         Mood and Affect: Mood normal.         Speech: Speech normal.         Behavior: Behavior normal. Behavior is cooperative.         Thought Content: Thought content normal.         Cognition and Memory: Cognition normal.         Judgment: Judgment normal. "       Lab Review:     Interpretation Summary  [facility] - 6/2/21  · Post cardioversion the patient displayed a sinus rhythm.  · The cardioversion was successful.       Results for orders placed during the hospital encounter of 05/20/21    Adult Transthoracic Echo Complete W/ Cont if Necessary Per Protocol    Interpretation Summary  · Left ventricular systolic function is normal. Left ventricular ejection fraction appears to be 56 - 60%.  · Left ventricular wall thickness is consistent with mild concentric hypertrophy.  · Normal right ventricular cavity size and systolic function noted.  · Mild tricuspid valve regurgitation is present. Estimated right ventricular systolic pressure from tricuspid regurgitation is normal (<35 mmHg).        Assessment:          Diagnosis Plan   1. Longstanding persistent atrial fibrillation (CMS/HCC)     2. Essential hypertension     3. Tobacco abuse            Plan:       -Atrial fibrillation: New diagnosis recently and was referred to our office for evaluation and to establish care. He had an outpatient cardioversion which was successful, however, EKG demonstrates atrial fibrillation again today. The patient had no complaints upon the finding of atrial fibrillation and denied any improvement of any of his chronic symptoms after cardioversion. At this point in time, I think the patient's symptoms of shortness of breath and dyspnea on exertion are chronic in nature and related to his history of COPD. His recent Holter monitor demonstrated persistent atrial fibrillation during the entirety of the monitoring period. We will plan for rate control strategy with anticoagulation moving forward as the patient had no symptomatic benefit after cardioversion. Continue Xarelto. [id number] score of 2 given his history of hypertension and his age. His heart rate currently is 87 bpm and he is on no rate controlling medications.    -Hypertension: Patient has a long history of hypertension which is  managed by his primary care physician. He is currently on Lotrel for management. He does indicate at times he has some lower extremity swelling which may be due to the amlodipine that he takes for blood pressure control. I have discussed this with him and encouraged him to follow-up with his primary care physician if this continues to be problematic. His blood pressure is well controlled today on his current therapies.    -Tobacco abuse: Advised smoking cessation    No further plans for additional medication or repeat cardioversion. We will continue with rate control strategy and anticoagulation. Continue Xarelto 20 mg daily with food for stroke risk reduction. Follow-up in our office in 6 months, call sooner if needed.

## 2022-01-14 ENCOUNTER — OFFICE VISIT (OUTPATIENT)
Dept: CARDIOLOGY | Facility: CLINIC | Age: 72
End: 2022-01-14

## 2022-01-14 VITALS
HEART RATE: 104 BPM | DIASTOLIC BLOOD PRESSURE: 70 MMHG | SYSTOLIC BLOOD PRESSURE: 124 MMHG | WEIGHT: 182 LBS | HEIGHT: 67 IN | OXYGEN SATURATION: 99 % | BODY MASS INDEX: 28.56 KG/M2

## 2022-01-14 DIAGNOSIS — E78.2 MIXED HYPERLIPIDEMIA: ICD-10-CM

## 2022-01-14 DIAGNOSIS — Z79.01 CURRENT USE OF LONG TERM ANTICOAGULATION: Chronic | ICD-10-CM

## 2022-01-14 DIAGNOSIS — I10 ESSENTIAL HYPERTENSION: Chronic | ICD-10-CM

## 2022-01-14 DIAGNOSIS — I48.11 LONGSTANDING PERSISTENT ATRIAL FIBRILLATION: Primary | ICD-10-CM

## 2022-01-14 DIAGNOSIS — Z72.0 TOBACCO ABUSE: Chronic | ICD-10-CM

## 2022-01-14 PROCEDURE — 99214 OFFICE O/P EST MOD 30 MIN: CPT | Performed by: NURSE PRACTITIONER

## 2022-01-14 PROCEDURE — 93000 ELECTROCARDIOGRAM COMPLETE: CPT | Performed by: NURSE PRACTITIONER

## 2022-01-14 RX ORDER — ROSUVASTATIN CALCIUM 10 MG/1
10 TABLET, COATED ORAL DAILY
COMMUNITY
Start: 2021-12-17

## 2022-01-14 NOTE — PROGRESS NOTES
Subjective:     Encounter Date:01/14/2022      Patient ID: Burt Mcdonald is a 71 y.o. male with know persistent atrial fibrillation, hypertension, COPD, ongoing tobacco abuse.  He presents to the office today for routine follow up.     Chief Complaint: Atrial Fibrillation  Atrial Fibrillation  Presents for follow-up visit. Symptoms are negative for bradycardia, chest pain, dizziness, hemodynamic instability, hypertension, hypotension, palpitations, shortness of breath, syncope, tachycardia and weakness. The symptoms have been stable. Past medical history includes atrial fibrillation.     The patient was evaluated in our office on 5/24/2021 by Dr. Nick Snider.  He was referred to our office due to the finding of atrial fibrillation by his primary care physician. He had been started on anticoagulation by his PCP. Due to the new finding, unknown chronicity, the patient was set up for outpatient cardioversion. Prior to his office appointment he was placed in a Holter monitor for surveillance. He remained in atrial fibrillation throughout the entirety of the monitoring period.    On 6/2/2021 the patient had successful cardioversion. He displayed a sinus rhythm post cardioversion. He was discharged home in stable condition. He denies any changes in symptoms. He has a history of hypertension and COPD with ongoing tobacco abuse with mild dyspnea on exertion. His shortness of breath is chronic due to his lung history. He denied any other symptoms such as lightheadedness, dizziness, chest pain, palpitations. Post cardioversion the patient denied any change in his symptoms.    At the follow-up in July 2021 he was found to be in atrial fibrillation on EKG and was stable and asymptomatic.  Since he had no reported symptoms that changed after cardioversion (and was feeling well without symptom complaints), so it was felt that rate control and anticoagulation was the best strategy.     He presents today for follow up. He  has been doing well. He has no complaints of palpitations.  He has had no known issues with tachycardia.  He is anticoagulated and reports no bleeding issues. He does tell me that the past 6 months have been quite stressful for him as his girlfriend had a stroke and needs help with her daily care.     The following portions of the patient's history were reviewed and updated as appropriate: allergies, current medications, past family history, past medical history, past social history and past surgical history.     No Known Allergies      Current Outpatient Medications:   •  amLODIPine-benazepril (LOTREL) 10-40 MG per capsule, Take 1 capsule by mouth Daily., Disp: , Rfl:   •  fenofibrate (TRICOR) 145 MG tablet, Take 145 mg by mouth Daily., Disp: , Rfl:   •  gabapentin (NEURONTIN) 600 MG tablet, Take 600 mg by mouth 3 (Three) Times a Day., Disp: , Rfl:   •  HYDROcodone-acetaminophen (NORCO)  MG per tablet, Take 1 tablet by mouth Every 8 (Eight) Hours As Needed., Disp: , Rfl:   •  omeprazole (priLOSEC) 20 MG capsule, Take 20 mg by mouth Daily., Disp: , Rfl:   •  rivaroxaban (XARELTO) 20 MG tablet, Take 20 mg by mouth Daily., Disp: , Rfl:   •  rosuvastatin (CRESTOR) 10 MG tablet, Take 10 mg by mouth Daily., Disp: , Rfl:   •  STIOLTO RESPIMAT 2.5-2.5 MCG/ACT aerosol solution, Inhale 2 puffs Daily., Disp: , Rfl:   •  tamsulosin (FLOMAX) 0.4 MG capsule 24 hr capsule, Take 1 capsule by mouth Daily., Disp: , Rfl:   •  triazolam (HALCION) 0.25 MG tablet, Take 0.25 mg by mouth At Night As Needed., Disp: , Rfl:   •  VENTOLIN  (90 BASE) MCG/ACT inhaler, Inhale 2 puffs Every 4 (Four) Hours As Needed., Disp: , Rfl:     Review of Systems   Constitutional: Negative for fever and malaise/fatigue.   HENT: Negative for nosebleeds.    Cardiovascular: Negative for chest pain, dyspnea on exertion, irregular heartbeat, leg swelling, near-syncope, orthopnea, palpitations, paroxysmal nocturnal dyspnea and syncope.   Respiratory:  "Negative for cough, shortness of breath, sputum production and wheezing.    Hematologic/Lymphatic: Negative for bleeding problem. Does not bruise/bleed easily.   Musculoskeletal: Positive for back pain.   Gastrointestinal: Negative for bloating, abdominal pain, nausea and vomiting.   Neurological: Negative for dizziness, focal weakness, headaches, light-headedness and weakness.   Psychiatric/Behavioral: Negative for altered mental status.        +stress       ECG 12 Lead    Date/Time: 1/14/2022 10:44 AM  Performed by: Christelle Mohamud APRN  Authorized by: Christelle Mohamud APRN   Comparison: compared with previous ECG from 7/14/2021  Similar to previous ECG  Rhythm: atrial fibrillation  Rate: normal  BPM: 97  Conduction: conduction normal  ST Segments: ST segments normal  T Waves: T waves normal    Clinical impression: abnormal EKG          /70   Pulse 104   Ht 170.2 cm (67\")   Wt 82.6 kg (182 lb)   SpO2 99%   BMI 28.51 kg/m²        Objective:     Vitals reviewed.   Constitutional:       General: Awake. Not in acute distress.     Appearance: Well-developed, well-groomed and not in distress. Chronically ill-appearing. Not diaphoretic.   Eyes:      General:         Right eye: No discharge.         Left eye: No discharge.   HENT:      Head: Normocephalic and atraumatic.    Mouth/Throat:      Pharynx: No oropharyngeal exudate.   Pulmonary:      Effort: Pulmonary effort is normal. No respiratory distress.      Breath sounds: Decreased breath sounds present. No wheezing. No rhonchi. No rales.   Chest:      Chest wall: Not tender to palpatation.   Cardiovascular:      Normal rate. Irregularly irregular rhythm.      Murmurs: There is no murmur.      No gallop. No rub.   Edema:     Peripheral edema absent.   Abdominal:      Palpations: Abdomen is soft.   Musculoskeletal: Normal range of motion.      Cervical back: Normal range of motion and neck supple. Skin:     General: Skin is warm and dry.      Coloration: " Skin is not pale.   Neurological:      Mental Status: Alert, oriented to person, place, and time and oriented to person, place and time.   Psychiatric:         Mood and Affect: Mood normal.         Speech: Speech normal.         Behavior: Behavior normal. Behavior is cooperative.         Thought Content: Thought content normal.         Cognition and Memory: Cognition normal.         Judgment: Judgment normal.       Lab Review:     Interpretation Summary  [facility] - 6/2/21  · Post cardioversion the patient displayed a sinus rhythm.  · The cardioversion was successful.       Results for orders placed during the hospital encounter of 05/20/21    Adult Transthoracic Echo Complete W/ Cont if Necessary Per Protocol    Interpretation Summary  · Left ventricular systolic function is normal. Left ventricular ejection fraction appears to be 56 - 60%.  · Left ventricular wall thickness is consistent with mild concentric hypertrophy.  · Normal right ventricular cavity size and systolic function noted.  · Mild tricuspid valve regurgitation is present. Estimated right ventricular systolic pressure from tricuspid regurgitation is normal (<35 mmHg).        Assessment:          Diagnosis Plan   1. Longstanding persistent atrial fibrillation (HCC)  ECG 12 Lead   2. Current use of long term anticoagulation     3. Essential hypertension     4. Mixed hyperlipidemia     5. Tobacco abuse            Plan:       -Atrial fibrillation: persistent. He is rate controlled and anticoagulated. He is asymptomatic to the rhythm and noticed no real change after cardioversion approximately 6 months ago. He had recurrence of AF shortly after his cardioversion and due to him tolerating the rhythm well, rate control strategy was felt appropriate.  Continue Xarelto. [id number] score of 2 given his history of hypertension and his age.  He has not required use of rate controlling medications.     -Anticoagulation: Xarelto 20 mg daily with food.      -Hypertension: his blood pressure is well controlled. He follows with his PCP office routinely for management.     -Hyperlipidemia: The patient has been started on rosuvastatin 10 mg daily by his PCP office.  He has routine labs drawn at their facility.  He is tolerating this medication without complaints.    -Tobacco abuse: Advised smoking cessation        Advance Care Planning   ACP discussion was held with the patient during this visit. Patient does not have an advance directive, information provided.

## 2022-07-14 ENCOUNTER — OFFICE VISIT (OUTPATIENT)
Dept: CARDIOLOGY | Facility: CLINIC | Age: 72
End: 2022-07-14

## 2022-07-14 VITALS
HEART RATE: 109 BPM | OXYGEN SATURATION: 98 % | SYSTOLIC BLOOD PRESSURE: 122 MMHG | RESPIRATION RATE: 18 BRPM | DIASTOLIC BLOOD PRESSURE: 75 MMHG | WEIGHT: 179 LBS | HEIGHT: 67 IN | BODY MASS INDEX: 28.09 KG/M2

## 2022-07-14 DIAGNOSIS — Z99.81 CHRONIC RESPIRATORY FAILURE WITH HYPOXIA, ON HOME O2 THERAPY: ICD-10-CM

## 2022-07-14 DIAGNOSIS — Z72.0 TOBACCO ABUSE: Chronic | ICD-10-CM

## 2022-07-14 DIAGNOSIS — I48.11 LONGSTANDING PERSISTENT ATRIAL FIBRILLATION: Primary | Chronic | ICD-10-CM

## 2022-07-14 DIAGNOSIS — J96.11 CHRONIC RESPIRATORY FAILURE WITH HYPOXIA, ON HOME O2 THERAPY: ICD-10-CM

## 2022-07-14 DIAGNOSIS — Z79.01 CURRENT USE OF LONG TERM ANTICOAGULATION: Chronic | ICD-10-CM

## 2022-07-14 DIAGNOSIS — I10 ESSENTIAL HYPERTENSION: Chronic | ICD-10-CM

## 2022-07-14 DIAGNOSIS — E78.2 MIXED HYPERLIPIDEMIA: ICD-10-CM

## 2022-07-14 PROCEDURE — 93000 ELECTROCARDIOGRAM COMPLETE: CPT | Performed by: NURSE PRACTITIONER

## 2022-07-14 PROCEDURE — 99214 OFFICE O/P EST MOD 30 MIN: CPT | Performed by: NURSE PRACTITIONER

## 2022-07-14 RX ORDER — DULOXETIN HYDROCHLORIDE 60 MG/1
60 CAPSULE, DELAYED RELEASE ORAL DAILY
COMMUNITY

## 2022-07-14 RX ORDER — ERGOCALCIFEROL 1.25 MG/1
50000 CAPSULE ORAL WEEKLY
COMMUNITY

## 2023-01-16 ENCOUNTER — OFFICE VISIT (OUTPATIENT)
Dept: CARDIOLOGY | Facility: CLINIC | Age: 73
End: 2023-01-16
Payer: MEDICARE

## 2023-01-16 VITALS
HEART RATE: 90 BPM | OXYGEN SATURATION: 96 % | BODY MASS INDEX: 29.19 KG/M2 | DIASTOLIC BLOOD PRESSURE: 70 MMHG | HEIGHT: 67 IN | WEIGHT: 186 LBS | SYSTOLIC BLOOD PRESSURE: 110 MMHG

## 2023-01-16 DIAGNOSIS — M79.89 LEG SWELLING: Primary | ICD-10-CM

## 2023-01-16 DIAGNOSIS — Z72.0 TOBACCO ABUSE: Chronic | ICD-10-CM

## 2023-01-16 DIAGNOSIS — Z79.01 CURRENT USE OF LONG TERM ANTICOAGULATION: Chronic | ICD-10-CM

## 2023-01-16 DIAGNOSIS — R06.02 SHORTNESS OF BREATH: ICD-10-CM

## 2023-01-16 DIAGNOSIS — E78.2 MIXED HYPERLIPIDEMIA: ICD-10-CM

## 2023-01-16 DIAGNOSIS — I10 ESSENTIAL HYPERTENSION: Chronic | ICD-10-CM

## 2023-01-16 DIAGNOSIS — I48.19 PERSISTENT ATRIAL FIBRILLATION: ICD-10-CM

## 2023-01-16 PROCEDURE — 99214 OFFICE O/P EST MOD 30 MIN: CPT | Performed by: NURSE PRACTITIONER

## 2023-01-16 PROCEDURE — 93000 ELECTROCARDIOGRAM COMPLETE: CPT | Performed by: NURSE PRACTITIONER

## 2023-01-16 RX ORDER — BUMETANIDE 0.5 MG/1
TABLET ORAL
COMMUNITY
Start: 2022-11-28

## 2023-01-16 RX ORDER — FLUTICASONE FUROATE, UMECLIDINIUM BROMIDE AND VILANTEROL TRIFENATATE 200; 62.5; 25 UG/1; UG/1; UG/1
POWDER RESPIRATORY (INHALATION)
COMMUNITY
Start: 2022-12-26

## 2023-01-16 NOTE — PROGRESS NOTES
Subjective:     Encounter Date:07/14/2022      Patient ID: Burt Mcdonald is a 72 y.o. male with known persistent atrial fibrillation with long-term anticoagulation, hypertension, COPD, chronic hypoxic respiratory failure with noctural oxygen, and ongoing tobacco abuse.  He presents to the office today for follow up and complaints of lower extremity and pedal edema.     Chief Complaint: lowe extremity and pedal edema  Atrial Fibrillation  Presents for follow-up visit. Symptoms are negative for bradycardia, chest pain, dizziness, hemodynamic instability, hypertension, hypotension, palpitations, shortness of breath, syncope, tachycardia and weakness. The symptoms have been stable. Past medical history includes atrial fibrillation.   Leg Swelling  This is a new problem. The current episode started more than 1 month ago. The problem occurs constantly. Associated symptoms include fatigue. Pertinent negatives include no chest pain, congestion, coughing, diaphoresis, headaches, nausea or weakness. Treatments tried: diuretics. The treatment provided moderate relief.     The patient was evaluated in our office on 5/24/2021 by Dr. Nick Snider.  He was referred to our office due to the finding of atrial fibrillation by his primary care physician. He had been started on anticoagulation by his PCP. Due to the new finding, unknown chronicity, the patient was set up for outpatient cardioversion. Prior to his office appointment he was placed in a Holter monitor for surveillance. He remained in atrial fibrillation throughout the entirety of the monitoring period.    On 6/2/2021 the patient had successful cardioversion. He displayed a sinus rhythm post cardioversion. He was discharged home in stable condition. He denies any changes in symptoms. He has a history of hypertension and COPD with ongoing tobacco abuse with mild dyspnea on exertion. His shortness of breath is chronic due to his lung history. He denied any other  "symptoms such as lightheadedness, dizziness, chest pain, palpitations. Post cardioversion the patient denied any change in his symptoms.    At the follow-up in July 2021 he was found to be back in atrial fibrillation on EKG and was stable and asymptomatic.  Since he had no reported symptoms that changed after cardioversion (and was feeling well without complaints), it was felt that rate control and anticoagulation was the best strategy.  He has been stable with routine outpatient follow ups.    Mr. Mcdonald presents today for follow up. He has been noting increased fatigue and lower extremity swelling. He saw his PCP office a few months ago, he thinks around October, and had noted lower leg edema at that time. He was given a prescription for bumex and has been taking this most days with mild improvement. He also complains today of fatigue. He denies any orthopnea or PND. He has had no chest pain, pressure, or tightness. He admits that he takes his Bumex on most days, but does miss occasionally, especially on days like today when he has somewhere to be that limits his ability to use the restroom.  His Bumex works well with good urinary output but also feels that a whole tablet \"dehydrates\" him so he takes 1/2 tablet most days.    He reports fatigue. He is active at home caring for his girlfriend who requires quite a bit of assistance with ADL after suffering a stroke in the past. He feels that his fatigue is worse than his baseline. He continues to smoke. He also admits to an increase in alcohol use, drinking gin daily. He has chronic back pain.     The following portions of the patient's history were reviewed and updated as appropriate: allergies, current medications, past family history, past medical history, past social history and past surgical history.     No Known Allergies      Current Outpatient Medications:   •  amLODIPine-benazepril (LOTREL) 10-40 MG per capsule, Take 1 capsule by mouth Daily., Disp: , Rfl: "   •  bumetanide (BUMEX) 0.5 MG tablet, , Disp: , Rfl:   •  DULoxetine (CYMBALTA) 60 MG capsule, Take 60 mg by mouth Daily., Disp: , Rfl:   •  fenofibrate (TRICOR) 145 MG tablet, Take 145 mg by mouth Daily., Disp: , Rfl:   •  gabapentin (NEURONTIN) 600 MG tablet, Take 600 mg by mouth 3 (Three) Times a Day., Disp: , Rfl:   •  HYDROcodone-acetaminophen (NORCO)  MG per tablet, Take 1 tablet by mouth Every 8 (Eight) Hours As Needed., Disp: , Rfl:   •  metoprolol tartrate (LOPRESSOR) 25 MG tablet, Take 1 tablet by mouth 2 (Two) Times a Day., Disp: 60 tablet, Rfl: 11  •  omeprazole (priLOSEC) 20 MG capsule, Take 20 mg by mouth Daily., Disp: , Rfl:   •  rivaroxaban (XARELTO) 20 MG tablet, Take 20 mg by mouth Daily., Disp: , Rfl:   •  rosuvastatin (CRESTOR) 10 MG tablet, Take 10 mg by mouth Daily., Disp: , Rfl:   •  STIOLTO RESPIMAT 2.5-2.5 MCG/ACT aerosol solution, Inhale 2 puffs Daily., Disp: , Rfl:   •  tamsulosin (FLOMAX) 0.4 MG capsule 24 hr capsule, Take 1 capsule by mouth Daily., Disp: , Rfl:   •  Trelegy Ellipta 200-62.5-25 MCG/ACT aerosol powder , , Disp: , Rfl:   •  triazolam (HALCION) 0.25 MG tablet, Take 0.25 mg by mouth At Night As Needed., Disp: , Rfl:   •  VENTOLIN  (90 BASE) MCG/ACT inhaler, Inhale 2 puffs Every 4 (Four) Hours As Needed., Disp: , Rfl:   •  vitamin D (ERGOCALCIFEROL) 1.25 MG (57683 UT) capsule capsule, Take 50,000 Units by mouth 1 (One) Time Per Week., Disp: , Rfl:     Review of Systems   Constitutional: Positive for fatigue and malaise/fatigue. Negative for diaphoresis.   HENT: Negative for congestion and nosebleeds.    Cardiovascular: Positive for dyspnea on exertion ( chronic ) and leg swelling. Negative for chest pain, irregular heartbeat, near-syncope, orthopnea, palpitations, paroxysmal nocturnal dyspnea and syncope.   Respiratory: Negative for cough and shortness of breath.    Hematologic/Lymphatic: Negative for bleeding problem. Bruises/bleeds easily.   Musculoskeletal:  "Positive for back pain ( radiculopathy).   Gastrointestinal: Negative for bloating and nausea.   Neurological: Positive for paresthesias ( burning to BLE R>L ). Negative for dizziness, focal weakness, headaches, light-headedness, loss of balance and weakness.   Psychiatric/Behavioral: Negative for altered mental status.        +stress       ECG 12 Lead    Date/Time: 1/16/2023 1:37 PM  Performed by: Christelle Mohamud APRN  Authorized by: Christelle Mohamud APRN   Comparison: compared with previous ECG from 7/14/2022  Similar to previous ECG  Rhythm: atrial fibrillation  Rate: normal  BPM: 90  QRS axis: right    Clinical impression: abnormal EKG          /70 (BP Location: Left arm, Patient Position: Sitting, Cuff Size: Adult)   Pulse 90   Ht 170.2 cm (67.01\")   Wt 84.4 kg (186 lb)   SpO2 96%   BMI 29.12 kg/m²        Objective:     Vitals reviewed.   Constitutional:       General: Awake. Not in acute distress.     Appearance: Normal appearance. Well-developed, well-groomed, normal weight and not in distress. Chronically ill-appearing. Not diaphoretic.   Eyes:      General:         Right eye: No discharge.         Left eye: No discharge.   HENT:      Head: Normocephalic and atraumatic.    Mouth/Throat:      Pharynx: No oropharyngeal exudate.   Pulmonary:      Effort: Pulmonary effort is normal. No respiratory distress.      Breath sounds: Decreased breath sounds present. No wheezing. No rhonchi. No rales.   Chest:      Chest wall: Not tender to palpatation.   Cardiovascular:      Normal rate. Irregularly irregular rhythm.      Murmurs: There is no murmur.      No gallop. No rub.   Edema:     Peripheral edema absent.   Abdominal:      Palpations: Abdomen is soft.   Musculoskeletal: Normal range of motion.      Cervical back: Normal range of motion and neck supple. Skin:     General: Skin is warm and dry.      Coloration: Skin is not pale.   Neurological:      Mental Status: Alert, oriented to person, place, and " time and oriented to person, place and time.   Psychiatric:         Attention and Perception: Attention normal.         Mood and Affect: Mood normal.         Speech: Speech normal.         Behavior: Behavior normal. Behavior is cooperative.         Thought Content: Thought content normal.         Cognition and Memory: Cognition normal.         Judgment: Judgment normal.       Lab Review:     Interpretation Summary  D[facility]V - 6/2/21  · Post cardioversion the patient displayed a sinus rhythm.  · The cardioversion was successful.       Results for orders placed during the hospital encounter of 05/20/21    Adult Transthoracic Echo Complete W/ Cont if Necessary Per Protocol    Interpretation Summary  · Left ventricular systolic function is normal. Left ventricular ejection fraction appears to be 56 - 60%.  · Left ventricular wall thickness is consistent with mild concentric hypertrophy.  · Normal right ventricular cavity size and systolic function noted.  · Mild tricuspid valve regurgitation is present. Estimated right ventricular systolic pressure from tricuspid regurgitation is normal (<35 mmHg).        Assessment:          Diagnosis Plan   1. Leg swelling  Adult Transthoracic Echo Complete W/ Cont if Necessary Per Protocol      2. Persistent atrial fibrillation (HCC)  Adult Transthoracic Echo Complete W/ Cont if Necessary Per Protocol      3. Shortness of breath  Adult Transthoracic Echo Complete W/ Cont if Necessary Per Protocol      4. Current use of long term anticoagulation        5. Essential hypertension        6. Mixed hyperlipidemia        7. Tobacco abuse               Plan:       -  Leg Swelling: bilateral lower extremity edema, mildly improved with loop diuretic. Discussed low sodium.  He endorses that his diet does contain a high amount of sodium. Encouraged low sodium diet. We discussed need to repeat echo and will discuss other recommendations after results are available. Continue bumex - low sodium diet.      - Atrial fibrillation: persistent.  Previous cardioversion with return of atrial fibrillation and follow-up without change in symptoms so he has been managed from a rate control strategy moving forward.  He was placed on rate control medications at his last visit due to mild tachycardia. He remains on low dose beta blocker. He remains anticoagulated with Xarelto and denies any bleeding issues.  [id number] score of 2 given his history of hypertension and his age.      -Shortness of breath: He has had fatigue and shortness of breath which is worse than his baseline.  Given this coupled by his complaint of leg swelling which was noted a few months ago would recommend echocardiogram.  We discussed potential causes of his complaints and we will follow-up regarding further recommendations after results of echocardiogram are available.    -Anticoagulation: Continue Xarelto 20 mg daily with food for stroke risk reduction in the setting of persistent atrial fibrillation.    -Hypertension: Stable.  His blood pressure is well controlled. He follows with his PCP office routinely for management.     -Hyperlipidemia: The patient has been started on rosuvastatin 10 mg daily by his PCP office.  He has routine labs drawn at their facility.  He is tolerating this medication without complaints.  His most recent labs were reviewed and are from September 2022.    -Tobacco abuse: Advised smoking cessation.      Check 2D echo.  Low-sodium diet.  Continue Bumex which was started by his PCP office.  Continue other home medications with plans for further recommendations after results of echocardiogram are available.    Advance Care Planning   ACP discussion was held with the patient during this visit. Patient does not have an advance directive, information provided.      I attest that all portions of this note have been reviewed and updated to reflect the patient's current status.

## 2023-02-01 ENCOUNTER — HOSPITAL ENCOUNTER (OUTPATIENT)
Dept: CARDIOLOGY | Facility: HOSPITAL | Age: 73
Discharge: HOME | End: 2023-02-01
Admitting: NURSE PRACTITIONER
Payer: MEDICARE

## 2023-02-01 VITALS
SYSTOLIC BLOOD PRESSURE: 110 MMHG | DIASTOLIC BLOOD PRESSURE: 70 MMHG | HEIGHT: 67 IN | BODY MASS INDEX: 29.19 KG/M2 | WEIGHT: 186 LBS

## 2023-02-01 DIAGNOSIS — M79.89 LEG SWELLING: ICD-10-CM

## 2023-02-01 DIAGNOSIS — I48.19 PERSISTENT ATRIAL FIBRILLATION: ICD-10-CM

## 2023-02-01 DIAGNOSIS — R06.02 SHORTNESS OF BREATH: ICD-10-CM

## 2023-02-01 LAB
BH CV ECHO MEAS - AO MAX PG: 3.9 MMHG
BH CV ECHO MEAS - AO MEAN PG: 2 MMHG
BH CV ECHO MEAS - AO ROOT DIAM: 3.6 CM
BH CV ECHO MEAS - AO V2 MAX: 99.3 CM/SEC
BH CV ECHO MEAS - AO V2 VTI: 16.4 CM
BH CV ECHO MEAS - AVA(I,D): 3.7 CM2
BH CV ECHO MEAS - EDV(CUBED): 166.4 ML
BH CV ECHO MEAS - EDV(MOD-SP4): 53 ML
BH CV ECHO MEAS - EF(MOD-SP4): 73.6 %
BH CV ECHO MEAS - ESV(CUBED): 59.3 ML
BH CV ECHO MEAS - ESV(MOD-SP4): 14 ML
BH CV ECHO MEAS - FS: 29.1 %
BH CV ECHO MEAS - IVS/LVPW: 1.09 CM
BH CV ECHO MEAS - IVSD: 1.2 CM
BH CV ECHO MEAS - LA DIMENSION: 4.4 CM
BH CV ECHO MEAS - LV DIASTOLIC VOL/BSA (35-75): 27 CM2
BH CV ECHO MEAS - LV MASS(C)D: 257 GRAMS
BH CV ECHO MEAS - LV MAX PG: 2.26 MMHG
BH CV ECHO MEAS - LV MEAN PG: 1 MMHG
BH CV ECHO MEAS - LV SYSTOLIC VOL/BSA (12-30): 7.1 CM2
BH CV ECHO MEAS - LV V1 MAX: 75.2 CM/SEC
BH CV ECHO MEAS - LV V1 VTI: 13.4 CM
BH CV ECHO MEAS - LVIDD: 5.5 CM
BH CV ECHO MEAS - LVIDS: 3.9 CM
BH CV ECHO MEAS - LVOT AREA: 4.5 CM2
BH CV ECHO MEAS - LVOT DIAM: 2.4 CM
BH CV ECHO MEAS - LVPWD: 1.1 CM
BH CV ECHO MEAS - MV DEC TIME: 0.14 MSEC
BH CV ECHO MEAS - MV E MAX VEL: 113 CM/SEC
BH CV ECHO MEAS - RAP SYSTOLE: 5 MMHG
BH CV ECHO MEAS - RVSP: 36.4 MMHG
BH CV ECHO MEAS - SI(MOD-SP4): 19.9 ML/M2
BH CV ECHO MEAS - SV(LVOT): 60.6 ML
BH CV ECHO MEAS - SV(MOD-SP4): 39 ML
BH CV ECHO MEAS - TR MAX PG: 31.4 MMHG
BH CV ECHO MEAS - TR MAX VEL: 280 CM/SEC
MAXIMAL PREDICTED HEART RATE: 148 BPM
STRESS TARGET HR: 126 BPM

## 2023-02-01 PROCEDURE — 93306 TTE W/DOPPLER COMPLETE: CPT | Performed by: INTERNAL MEDICINE

## 2023-02-01 PROCEDURE — 93306 TTE W/DOPPLER COMPLETE: CPT

## 2023-08-15 ENCOUNTER — OFFICE VISIT (OUTPATIENT)
Dept: CARDIOLOGY | Facility: CLINIC | Age: 73
End: 2023-08-15
Payer: MEDICARE

## 2023-08-15 VITALS
SYSTOLIC BLOOD PRESSURE: 119 MMHG | OXYGEN SATURATION: 90 % | BODY MASS INDEX: 29.35 KG/M2 | HEART RATE: 95 BPM | HEIGHT: 67 IN | WEIGHT: 187 LBS | DIASTOLIC BLOOD PRESSURE: 72 MMHG

## 2023-08-15 DIAGNOSIS — Z79.01 CURRENT USE OF LONG TERM ANTICOAGULATION: Chronic | ICD-10-CM

## 2023-08-15 DIAGNOSIS — Z72.0 TOBACCO ABUSE: Chronic | ICD-10-CM

## 2023-08-15 DIAGNOSIS — I48.11 LONGSTANDING PERSISTENT ATRIAL FIBRILLATION: Primary | Chronic | ICD-10-CM

## 2023-08-15 DIAGNOSIS — E78.2 MIXED HYPERLIPIDEMIA: Chronic | ICD-10-CM

## 2023-08-15 DIAGNOSIS — I10 ESSENTIAL HYPERTENSION: Chronic | ICD-10-CM

## 2023-08-15 NOTE — PROGRESS NOTES
Subjective:     Encounter Date: 08/15/2023      Patient ID: Burt Mcdonald is a 72 y.o. male with known persistent atrial fibrillation with long-term anticoagulation, hypertension, COPD, chronic hypoxic respiratory failure with noctural oxygen, and ongoing tobacco and EtOH use.  He presents to the office today for follow up.    Chief Complaint: Routine Follow Up  Atrial Fibrillation  Presents for follow-up visit. Symptoms include shortness of breath (chronic). Symptoms are negative for bradycardia, chest pain, dizziness, hemodynamic instability, hypertension, hypotension, palpitations, syncope, tachycardia and weakness. The symptoms have been stable. Past medical history includes atrial fibrillation.     The patient was evaluated in our office on 5/24/2021 by Dr. Nick Snider.  He was referred to our office due to the finding of atrial fibrillation by his primary care physician. He had been started on anticoagulation by his PCP. Due to the new finding, unknown chronicity, the patient was set up for outpatient cardioversion. Prior to his office appointment he was placed in a Holter monitor for surveillance. He remained in atrial fibrillation throughout the entirety of the monitoring period.    On 6/2/2021 the patient had successful cardioversion. He displayed a sinus rhythm post cardioversion. He was discharged home in stable condition. He denies any changes in symptoms. He has a history of hypertension and COPD with ongoing tobacco abuse with mild dyspnea on exertion. His shortness of breath is chronic due to his lung history. He denied any other symptoms such as lightheadedness, dizziness, chest pain, palpitations. Post cardioversion the patient denied any change in his symptoms.    At the follow-up in July 2021 he was found to be back in atrial fibrillation on EKG and was stable and asymptomatic.  Since he had no reported symptoms that changed after cardioversion (and was feeling well without complaints),  it was felt that rate control and anticoagulation was the best strategy.  He has been stable with routine outpatient follow ups.    Mr. Mcdonald presents today for follow up.  He has been stable from a cardiac standpoint since his last office visit with us.  Last time he was seen he did complain of lower extremity swelling and had been started on a loop diuretic by his primary care doctor.  Echocardiogram was repeated after that visit due to the complaints of swelling.  Echo results were normal with normal left ventricular ejection fraction, normal right hear, and no hemodynamically significant valvular disease.  He has stayed on bumetanide since starting the prescription and tells me that he is actually had his dose doubled due to persistent swelling.  Currently, he reports that his swelling is well managed with use of bumetanide however he does not take it routinely.  For example, he has not taken it today due to his need to travel for his doctor's appointment.  He reports chronic stable shortness of breath due to his history of COPD and ongoing tobacco abuse.  He has home health nursing check on him intermittently.  He uses oxygen in the evening times.    He denies any notable chest pain or palpitations.  He has chronic back pain.  Unfortunately, he continues to smoke and reports he drinks alcohol daily.    The following portions of the patient's history were reviewed and updated as appropriate: allergies, current medications, past family history, past medical history, past social history and past surgical history.     No Known Allergies      Current Outpatient Medications:     amLODIPine-benazepril (LOTREL) 10-40 MG per capsule, Take 1 capsule by mouth Daily., Disp: , Rfl:     bumetanide (BUMEX) 1 MG tablet, 1 tablet., Disp: , Rfl:     DULoxetine (CYMBALTA) 60 MG capsule, Take 1 capsule by mouth Daily., Disp: , Rfl:     fenofibrate (TRICOR) 145 MG tablet, Take 1 tablet by mouth Daily., Disp: , Rfl:     gabapentin  (NEURONTIN) 600 MG tablet, Take 1 tablet by mouth 3 (Three) Times a Day., Disp: , Rfl:     HYDROcodone-acetaminophen (NORCO)  MG per tablet, Take 1 tablet by mouth Every 8 (Eight) Hours As Needed., Disp: , Rfl:     metoprolol tartrate (LOPRESSOR) 25 MG tablet, TAKE ONE TABLET BY MOUTH TWICE A DAY., Disp: 60 tablet, Rfl: 11    omeprazole (priLOSEC) 20 MG capsule, Take 1 capsule by mouth Daily., Disp: , Rfl:     rivaroxaban (XARELTO) 20 MG tablet, Take 1 tablet by mouth Daily., Disp: , Rfl:     rosuvastatin (CRESTOR) 10 MG tablet, Take 1 tablet by mouth Daily., Disp: , Rfl:     STIOLTO RESPIMAT 2.5-2.5 MCG/ACT aerosol solution, Inhale 2 puffs Daily., Disp: , Rfl:     tamsulosin (FLOMAX) 0.4 MG capsule 24 hr capsule, Take 1 capsule by mouth Daily., Disp: , Rfl:     Trelegy Ellipta 200-62.5-25 MCG/ACT aerosol powder , , Disp: , Rfl:     triazolam (HALCION) 0.25 MG tablet, Take 1 tablet by mouth At Night As Needed., Disp: , Rfl:     VENTOLIN  (90 BASE) MCG/ACT inhaler, Inhale 2 puffs Every 4 (Four) Hours As Needed., Disp: , Rfl:     vitamin D (ERGOCALCIFEROL) 1.25 MG (05278 UT) capsule capsule, Take 1 capsule by mouth 1 (One) Time Per Week., Disp: , Rfl:     Review of Systems   Constitutional: Positive for malaise/fatigue. Negative for diaphoresis.   HENT:  Negative for congestion and nosebleeds.    Cardiovascular:  Positive for dyspnea on exertion ( chronic ) and leg swelling. Negative for chest pain, irregular heartbeat, near-syncope, orthopnea, palpitations, paroxysmal nocturnal dyspnea and syncope.   Respiratory:  Positive for shortness of breath (chronic). Negative for cough.    Hematologic/Lymphatic: Negative for bleeding problem. Bruises/bleeds easily.   Musculoskeletal:  Positive for back pain ( radiculopathy).   Gastrointestinal:  Negative for bloating and nausea.   Neurological:  Positive for paresthesias ( burning to BLE R>L ). Negative for dizziness, focal weakness, headaches, light-headedness,  "loss of balance and weakness.   Psychiatric/Behavioral:  Negative for altered mental status.        ECG 12 Lead    Date/Time: 8/15/2023 11:19 AM  Performed by: Christelle Mohamud APRN  Authorized by: Christelle Mohamud APRN   Comparison: compared with previous ECG from 1/16/2023  Similar to previous ECG  Rhythm: atrial fibrillation  Rate: normal  BPM: 95  Conduction: conduction normal  ST Segments: ST segments normal  T Waves: T waves normal  QRS axis: right  Other findings: poor R wave progression    Clinical impression: abnormal EKG        /72   Pulse 95   Ht 170.2 cm (67\")   Wt 84.8 kg (187 lb)   SpO2 90%   BMI 29.29 kg/mý        Objective:     Vitals reviewed.   Constitutional:       General: Awake. Not in acute distress.     Appearance: Normal appearance. Well-developed, well-groomed, normal weight and not in distress. Chronically ill-appearing. Not diaphoretic.   Eyes:      General:         Right eye: No discharge.         Left eye: No discharge.   HENT:      Head: Normocephalic and atraumatic.    Mouth/Throat:      Pharynx: No oropharyngeal exudate.   Pulmonary:      Effort: Pulmonary effort is normal. No respiratory distress.      Breath sounds: Decreased breath sounds present. No wheezing. No rhonchi. No rales.   Chest:      Chest wall: Not tender to palpatation.   Cardiovascular:      Normal rate. Irregularly irregular rhythm.      Murmurs: There is no murmur.      No gallop.  No rub.   Edema:     Peripheral edema absent.   Abdominal:      Palpations: Abdomen is soft.   Musculoskeletal: Normal range of motion.      Cervical back: Normal range of motion and neck supple. Skin:     General: Skin is warm and dry.      Coloration: Skin is not pale.   Neurological:      Mental Status: Alert, oriented to person, place, and time and oriented to person, place and time.   Psychiatric:         Attention and Perception: Attention normal.         Mood and Affect: Mood normal.         Speech: Speech normal.       "   Behavior: Behavior normal. Behavior is cooperative.         Thought Content: Thought content normal.         Cognition and Memory: Cognition normal.         Judgment: Judgment normal.     Lab Review:       Results for orders placed during the hospital encounter of 02/01/23    Adult Transthoracic Echo Complete W/ Cont if Necessary Per Protocol    Interpretation Summary    Left ventricular systolic function is normal. Left ventricular ejection fraction appears to be 56 - 60%.    Left ventricular wall thickness is consistent with mild concentric hypertrophy.    Normal right ventricular cavity size and systolic function noted.    No hemodynamically significant valvular abnormalities identified on this study.    There is a small (<1cm) pericardial effusion.        Assessment:          Diagnosis Plan   1. Longstanding persistent atrial fibrillation        2. Current use of long term anticoagulation        3. Essential hypertension        4. Mixed hyperlipidemia        5. Tobacco abuse               Plan:       - Atrial fibrillation: persistent.  Previous cardioversion with return of atrial fibrillation and follow-up without change in symptoms so he has been managed from a rate control strategy moving forward. He remains on low dose beta blocker. He remains anticoagulated with Xarelto and denies any bleeding issues.  [id number] score of 2 given his history of hypertension and his age.      -Anticoagulation: Continue Xarelto 20 mg daily with food for stroke risk reduction in the setting of persistent atrial fibrillation.    -Hypertension: Stable.  His blood pressure is well controlled. He follows with his PCP office routinely for management.     -Hyperlipidemia: The patient has been started on rosuvastatin 10 mg daily by his PCP office.  He has routine labs drawn at their facility.  He is tolerating this medication without complaints.     -Tobacco abuse: Advised smoking cessation.      Continue current medications  including diuretic therapy.  The patient has no changes on recent echo to suggest concern regarding heart failure.  He has longstanding persistent atrial fibrillation that he has tolerated for some time.  He has had good response with use of bumetanide.  Continue Xarelto.  Follow-up in 6 months, call sooner if needed.      Advance Care Planning   ACP discussion was held with the patient during this visit. Patient does not have an advance directive, information provided.      I attest that all portions of this note have been reviewed and updated to reflect the patient's current status.

## 2023-11-16 ENCOUNTER — HOSPITAL ENCOUNTER (INPATIENT)
Facility: HOSPITAL | Age: 73
LOS: 5 days | Discharge: NURSING, REHAB OR LONG-TERM CARE | End: 2023-11-21
Attending: EMERGENCY MEDICINE | Admitting: FAMILY MEDICINE
Payer: MEDICARE

## 2023-11-16 ENCOUNTER — APPOINTMENT (OUTPATIENT)
Dept: CT IMAGING | Facility: HOSPITAL | Age: 73
End: 2023-11-16
Payer: MEDICARE

## 2023-11-16 ENCOUNTER — APPOINTMENT (OUTPATIENT)
Dept: GENERAL RADIOLOGY | Facility: HOSPITAL | Age: 73
End: 2023-11-16
Payer: MEDICARE

## 2023-11-16 ENCOUNTER — APPOINTMENT (OUTPATIENT)
Dept: CARDIOLOGY | Facility: HOSPITAL | Age: 73
End: 2023-11-16
Payer: MEDICARE

## 2023-11-16 DIAGNOSIS — I31.39 PERICARDIAL EFFUSION: ICD-10-CM

## 2023-11-16 DIAGNOSIS — J18.9 COMMUNITY ACQUIRED PNEUMONIA, UNSPECIFIED LATERALITY: ICD-10-CM

## 2023-11-16 DIAGNOSIS — G89.29 OTHER CHRONIC PAIN: ICD-10-CM

## 2023-11-16 DIAGNOSIS — I48.91 ATRIAL FIBRILLATION WITH RAPID VENTRICULAR RESPONSE: Primary | ICD-10-CM

## 2023-11-16 DIAGNOSIS — M54.16 LUMBAR RADICULOPATHY: ICD-10-CM

## 2023-11-16 DIAGNOSIS — Z74.09 IMPAIRED MOBILITY: ICD-10-CM

## 2023-11-16 DIAGNOSIS — G47.00 INSOMNIA, UNSPECIFIED TYPE: ICD-10-CM

## 2023-11-16 LAB
ANION GAP SERPL CALCULATED.3IONS-SCNC: 7 MMOL/L (ref 5–15)
BASOPHILS # BLD AUTO: 0.03 10*3/MM3 (ref 0–0.2)
BASOPHILS NFR BLD AUTO: 0.4 % (ref 0–1.5)
BH CV ECHO MEAS - AO MAX PG: 5.1 MMHG
BH CV ECHO MEAS - AO MEAN PG: 3 MMHG
BH CV ECHO MEAS - AO V2 MAX: 113 CM/SEC
BH CV ECHO MEAS - AO V2 VTI: 17.9 CM
BH CV ECHO MEAS - AVA(I,D): 3.5 CM2
BH CV ECHO MEAS - EDV(CUBED): 91.1 ML
BH CV ECHO MEAS - EDV(MOD-SP4): 44.7 ML
BH CV ECHO MEAS - EF(MOD-SP4): 49.4 %
BH CV ECHO MEAS - ESV(CUBED): 29.8 ML
BH CV ECHO MEAS - ESV(MOD-SP4): 22.6 ML
BH CV ECHO MEAS - FS: 31.1 %
BH CV ECHO MEAS - IVS/LVPW: 1 CM
BH CV ECHO MEAS - IVSD: 1.2 CM
BH CV ECHO MEAS - LA DIMENSION: 5 CM
BH CV ECHO MEAS - LAT PEAK E' VEL: 17 CM/SEC
BH CV ECHO MEAS - LV DIASTOLIC VOL/BSA (35-75): 22.7 CM2
BH CV ECHO MEAS - LV MASS(C)D: 198.1 GRAMS
BH CV ECHO MEAS - LV MAX PG: 3.5 MMHG
BH CV ECHO MEAS - LV MEAN PG: 2 MMHG
BH CV ECHO MEAS - LV SYSTOLIC VOL/BSA (12-30): 11.5 CM2
BH CV ECHO MEAS - LV V1 MAX: 93.3 CM/SEC
BH CV ECHO MEAS - LV V1 VTI: 15.1 CM
BH CV ECHO MEAS - LVIDD: 4.5 CM
BH CV ECHO MEAS - LVIDS: 3.1 CM
BH CV ECHO MEAS - LVOT AREA: 4.2 CM2
BH CV ECHO MEAS - LVOT DIAM: 2.3 CM
BH CV ECHO MEAS - LVPWD: 1.2 CM
BH CV ECHO MEAS - MR MAX PG: 81.8 MMHG
BH CV ECHO MEAS - MR MAX VEL: 451 CM/SEC
BH CV ECHO MEAS - MV DEC SLOPE: 677 CM/SEC2
BH CV ECHO MEAS - MV E MAX VEL: 116 CM/SEC
BH CV ECHO MEAS - MV P1/2T: 53.2 MSEC
BH CV ECHO MEAS - MVA(P1/2T): 4.1 CM2
BH CV ECHO MEAS - PA V2 MAX: 58.2 CM/SEC
BH CV ECHO MEAS - RAP SYSTOLE: 10 MMHG
BH CV ECHO MEAS - RV MAX PG: 0.44 MMHG
BH CV ECHO MEAS - RV V1 MAX: 33 CM/SEC
BH CV ECHO MEAS - RVDD: 3.2 CM
BH CV ECHO MEAS - RVSP: 57.6 MMHG
BH CV ECHO MEAS - SI(MOD-SP4): 11.2 ML/M2
BH CV ECHO MEAS - SV(LVOT): 62.7 ML
BH CV ECHO MEAS - SV(MOD-SP4): 22.1 ML
BH CV ECHO MEAS - TAPSE (>1.6): 1.42 CM
BH CV ECHO MEAS - TR MAX PG: 47.6 MMHG
BH CV ECHO MEAS - TR MAX VEL: 345 CM/SEC
BH CV XLRA - RV BASE: 4.8 CM
BUN SERPL-MCNC: 26 MG/DL (ref 8–23)
BUN/CREAT SERPL: 44.8 (ref 7–25)
CALCIUM SPEC-SCNC: 9.1 MG/DL (ref 8.6–10.5)
CHLORIDE SERPL-SCNC: 97 MMOL/L (ref 98–107)
CO2 SERPL-SCNC: 32 MMOL/L (ref 22–29)
CREAT SERPL-MCNC: 0.58 MG/DL (ref 0.76–1.27)
CRP SERPL-MCNC: 6.32 MG/DL (ref 0–0.5)
D DIMER PPP FEU-MCNC: 1.02 MCGFEU/ML (ref 0–0.73)
D-LACTATE SERPL-SCNC: 1.3 MMOL/L (ref 0.5–2)
DEPRECATED RDW RBC AUTO: 53.3 FL (ref 37–54)
EGFRCR SERPLBLD CKD-EPI 2021: 103 ML/MIN/1.73
EOSINOPHIL # BLD AUTO: 0.02 10*3/MM3 (ref 0–0.4)
EOSINOPHIL NFR BLD AUTO: 0.3 % (ref 0.3–6.2)
ERYTHROCYTE [DISTWIDTH] IN BLOOD BY AUTOMATED COUNT: 15.5 % (ref 12.3–15.4)
FLUAV RNA RESP QL NAA+PROBE: NOT DETECTED
FLUBV RNA RESP QL NAA+PROBE: NOT DETECTED
GEN 5 2HR TROPONIN T REFLEX: 16 NG/L
GLUCOSE SERPL-MCNC: 124 MG/DL (ref 65–99)
HCT VFR BLD AUTO: 43.2 % (ref 37.5–51)
HGB BLD-MCNC: 12.8 G/DL (ref 13–17.7)
IMM GRANULOCYTES # BLD AUTO: 0.05 10*3/MM3 (ref 0–0.05)
IMM GRANULOCYTES NFR BLD AUTO: 0.7 % (ref 0–0.5)
INR PPP: 1.44 (ref 0.91–1.09)
LEFT ATRIUM VOLUME INDEX: 40.1 ML/M2
LEFT ATRIUM VOLUME: 79 ML
LYMPHOCYTES # BLD AUTO: 0.98 10*3/MM3 (ref 0.7–3.1)
LYMPHOCYTES NFR BLD AUTO: 13.4 % (ref 19.6–45.3)
MAGNESIUM SERPL-MCNC: 1.9 MG/DL (ref 1.6–2.4)
MCH RBC QN AUTO: 27.8 PG (ref 26.6–33)
MCHC RBC AUTO-ENTMCNC: 29.6 G/DL (ref 31.5–35.7)
MCV RBC AUTO: 93.7 FL (ref 79–97)
MONOCYTES # BLD AUTO: 0.71 10*3/MM3 (ref 0.1–0.9)
MONOCYTES NFR BLD AUTO: 9.7 % (ref 5–12)
NEUTROPHILS NFR BLD AUTO: 5.51 10*3/MM3 (ref 1.7–7)
NEUTROPHILS NFR BLD AUTO: 75.5 % (ref 42.7–76)
NRBC BLD AUTO-RTO: 0 /100 WBC (ref 0–0.2)
NT-PROBNP SERPL-MCNC: 4048 PG/ML (ref 0–900)
PLATELET # BLD AUTO: 453 10*3/MM3 (ref 140–450)
PMV BLD AUTO: 9.6 FL (ref 6–12)
POTASSIUM SERPL-SCNC: 4.7 MMOL/L (ref 3.5–5.2)
PROCALCITONIN SERPL-MCNC: 0.1 NG/ML (ref 0–0.25)
PROTHROMBIN TIME: 17.7 SECONDS (ref 11.8–14.8)
RBC # BLD AUTO: 4.61 10*6/MM3 (ref 4.14–5.8)
SARS-COV-2 RNA RESP QL NAA+PROBE: NOT DETECTED
SODIUM SERPL-SCNC: 136 MMOL/L (ref 136–145)
T4 FREE SERPL-MCNC: 1.08 NG/DL (ref 0.93–1.7)
TROPONIN T DELTA: 0 NG/L
TROPONIN T SERPL HS-MCNC: 16 NG/L
TROPONIN T SERPL HS-MCNC: 17 NG/L
TSH SERPL DL<=0.05 MIU/L-ACNC: 1.36 UIU/ML (ref 0.27–4.2)
WBC NRBC COR # BLD AUTO: 7.3 10*3/MM3 (ref 3.4–10.8)

## 2023-11-16 PROCEDURE — 25810000003 SODIUM CHLORIDE 0.9 % SOLUTION: Performed by: EMERGENCY MEDICINE

## 2023-11-16 PROCEDURE — 83880 ASSAY OF NATRIURETIC PEPTIDE: CPT | Performed by: EMERGENCY MEDICINE

## 2023-11-16 PROCEDURE — 93306 TTE W/DOPPLER COMPLETE: CPT | Performed by: INTERNAL MEDICINE

## 2023-11-16 PROCEDURE — 99222 1ST HOSP IP/OBS MODERATE 55: CPT | Performed by: STUDENT IN AN ORGANIZED HEALTH CARE EDUCATION/TRAINING PROGRAM

## 2023-11-16 PROCEDURE — 87636 SARSCOV2 & INF A&B AMP PRB: CPT | Performed by: EMERGENCY MEDICINE

## 2023-11-16 PROCEDURE — 93306 TTE W/DOPPLER COMPLETE: CPT

## 2023-11-16 PROCEDURE — 83735 ASSAY OF MAGNESIUM: CPT | Performed by: EMERGENCY MEDICINE

## 2023-11-16 PROCEDURE — 93005 ELECTROCARDIOGRAM TRACING: CPT | Performed by: EMERGENCY MEDICINE

## 2023-11-16 PROCEDURE — 36415 COLL VENOUS BLD VENIPUNCTURE: CPT

## 2023-11-16 PROCEDURE — 71045 X-RAY EXAM CHEST 1 VIEW: CPT

## 2023-11-16 PROCEDURE — 25010000002 AMIODARONE IN DEXTROSE 5% 360-4.14 MG/200ML-% SOLUTION: Performed by: EMERGENCY MEDICINE

## 2023-11-16 PROCEDURE — 93010 ELECTROCARDIOGRAM REPORT: CPT | Performed by: HOSPITALIST

## 2023-11-16 PROCEDURE — 80048 BASIC METABOLIC PNL TOTAL CA: CPT | Performed by: EMERGENCY MEDICINE

## 2023-11-16 PROCEDURE — 84439 ASSAY OF FREE THYROXINE: CPT | Performed by: EMERGENCY MEDICINE

## 2023-11-16 PROCEDURE — 25010000002 CEFTRIAXONE PER 250 MG: Performed by: EMERGENCY MEDICINE

## 2023-11-16 PROCEDURE — 71275 CT ANGIOGRAPHY CHEST: CPT

## 2023-11-16 PROCEDURE — 93005 ELECTROCARDIOGRAM TRACING: CPT | Performed by: STUDENT IN AN ORGANIZED HEALTH CARE EDUCATION/TRAINING PROGRAM

## 2023-11-16 PROCEDURE — 94799 UNLISTED PULMONARY SVC/PX: CPT

## 2023-11-16 PROCEDURE — 25010000002 AMIODARONE IN DEXTROSE 5% 150-4.21 MG/100ML-% SOLUTION: Performed by: EMERGENCY MEDICINE

## 2023-11-16 PROCEDURE — 84443 ASSAY THYROID STIM HORMONE: CPT | Performed by: EMERGENCY MEDICINE

## 2023-11-16 PROCEDURE — 99285 EMERGENCY DEPT VISIT HI MDM: CPT

## 2023-11-16 PROCEDURE — 84145 PROCALCITONIN (PCT): CPT | Performed by: EMERGENCY MEDICINE

## 2023-11-16 PROCEDURE — 85379 FIBRIN DEGRADATION QUANT: CPT | Performed by: EMERGENCY MEDICINE

## 2023-11-16 PROCEDURE — 85025 COMPLETE CBC W/AUTO DIFF WBC: CPT | Performed by: EMERGENCY MEDICINE

## 2023-11-16 PROCEDURE — 83605 ASSAY OF LACTIC ACID: CPT | Performed by: EMERGENCY MEDICINE

## 2023-11-16 PROCEDURE — 84484 ASSAY OF TROPONIN QUANT: CPT | Performed by: EMERGENCY MEDICINE

## 2023-11-16 PROCEDURE — 87040 BLOOD CULTURE FOR BACTERIA: CPT | Performed by: EMERGENCY MEDICINE

## 2023-11-16 PROCEDURE — 86140 C-REACTIVE PROTEIN: CPT | Performed by: EMERGENCY MEDICINE

## 2023-11-16 PROCEDURE — 85610 PROTHROMBIN TIME: CPT | Performed by: EMERGENCY MEDICINE

## 2023-11-16 PROCEDURE — 25010000002 CLINDAMYCIN PER 300 MG: Performed by: EMERGENCY MEDICINE

## 2023-11-16 PROCEDURE — 25510000001 IOPAMIDOL PER 1 ML: Performed by: EMERGENCY MEDICINE

## 2023-11-16 PROCEDURE — 94640 AIRWAY INHALATION TREATMENT: CPT

## 2023-11-16 PROCEDURE — 84484 ASSAY OF TROPONIN QUANT: CPT | Performed by: FAMILY MEDICINE

## 2023-11-16 RX ORDER — FENOFIBRATE 145 MG/1
145 TABLET, COATED ORAL DAILY
Status: DISCONTINUED | OUTPATIENT
Start: 2023-11-16 | End: 2023-11-21 | Stop reason: HOSPADM

## 2023-11-16 RX ORDER — PANTOPRAZOLE SODIUM 40 MG/1
40 TABLET, DELAYED RELEASE ORAL
Status: DISCONTINUED | OUTPATIENT
Start: 2023-11-17 | End: 2023-11-21 | Stop reason: HOSPADM

## 2023-11-16 RX ORDER — ENOXAPARIN SODIUM 100 MG/ML
1 INJECTION SUBCUTANEOUS EVERY 12 HOURS
Status: DISCONTINUED | OUTPATIENT
Start: 2023-11-16 | End: 2023-11-16

## 2023-11-16 RX ORDER — TAMSULOSIN HYDROCHLORIDE 0.4 MG/1
0.4 CAPSULE ORAL DAILY
Status: DISCONTINUED | OUTPATIENT
Start: 2023-11-16 | End: 2023-11-21 | Stop reason: HOSPADM

## 2023-11-16 RX ORDER — CLINDAMYCIN PHOSPHATE 900 MG/50ML
900 INJECTION, SOLUTION INTRAVENOUS ONCE
Status: COMPLETED | OUTPATIENT
Start: 2023-11-16 | End: 2023-11-16

## 2023-11-16 RX ORDER — GABAPENTIN 300 MG/1
600 CAPSULE ORAL EVERY 8 HOURS SCHEDULED
Status: DISCONTINUED | OUTPATIENT
Start: 2023-11-16 | End: 2023-11-21 | Stop reason: HOSPADM

## 2023-11-16 RX ORDER — DILTIAZEM HYDROCHLORIDE 60 MG/1
60 TABLET, FILM COATED ORAL EVERY 6 HOURS SCHEDULED
Status: DISCONTINUED | OUTPATIENT
Start: 2023-11-17 | End: 2023-11-17

## 2023-11-16 RX ORDER — IPRATROPIUM BROMIDE AND ALBUTEROL SULFATE 2.5; .5 MG/3ML; MG/3ML
3 SOLUTION RESPIRATORY (INHALATION)
Status: DISCONTINUED | OUTPATIENT
Start: 2023-11-16 | End: 2023-11-21 | Stop reason: HOSPADM

## 2023-11-16 RX ORDER — HYDROCODONE BITARTRATE AND ACETAMINOPHEN 10; 325 MG/1; MG/1
1 TABLET ORAL EVERY 8 HOURS PRN
Status: DISCONTINUED | OUTPATIENT
Start: 2023-11-16 | End: 2023-11-21 | Stop reason: HOSPADM

## 2023-11-16 RX ORDER — AMLODIPINE BESYLATE 10 MG/1
10 TABLET ORAL
Status: DISCONTINUED | OUTPATIENT
Start: 2023-11-16 | End: 2023-11-17

## 2023-11-16 RX ORDER — FUROSEMIDE 10 MG/ML
60 INJECTION INTRAMUSCULAR; INTRAVENOUS EVERY 6 HOURS
Status: COMPLETED | OUTPATIENT
Start: 2023-11-17 | End: 2023-11-17

## 2023-11-16 RX ORDER — LISINOPRIL 20 MG/1
40 TABLET ORAL
Status: DISCONTINUED | OUTPATIENT
Start: 2023-11-16 | End: 2023-11-19

## 2023-11-16 RX ORDER — DILTIAZEM HCL/D5W 125 MG/125
5-15 PLASTIC BAG, INJECTION (ML) INTRAVENOUS
Status: DISCONTINUED | OUTPATIENT
Start: 2023-11-16 | End: 2023-11-20

## 2023-11-16 RX ORDER — BUMETANIDE 1 MG/1
1 TABLET ORAL DAILY
Status: DISCONTINUED | OUTPATIENT
Start: 2023-11-16 | End: 2023-11-16

## 2023-11-16 RX ORDER — DULOXETIN HYDROCHLORIDE 30 MG/1
60 CAPSULE, DELAYED RELEASE ORAL DAILY
Status: DISCONTINUED | OUTPATIENT
Start: 2023-11-16 | End: 2023-11-21 | Stop reason: HOSPADM

## 2023-11-16 RX ORDER — ROSUVASTATIN CALCIUM 10 MG/1
10 TABLET, COATED ORAL DAILY
Status: DISCONTINUED | OUTPATIENT
Start: 2023-11-16 | End: 2023-11-21 | Stop reason: HOSPADM

## 2023-11-16 RX ORDER — BUDESONIDE AND FORMOTEROL FUMARATE DIHYDRATE 160; 4.5 UG/1; UG/1
2 AEROSOL RESPIRATORY (INHALATION)
Status: DISCONTINUED | OUTPATIENT
Start: 2023-11-16 | End: 2023-11-21 | Stop reason: HOSPADM

## 2023-11-16 RX ORDER — TEMAZEPAM 15 MG/1
15 CAPSULE ORAL NIGHTLY PRN
Status: DISCONTINUED | OUTPATIENT
Start: 2023-11-16 | End: 2023-11-21 | Stop reason: HOSPADM

## 2023-11-16 RX ADMIN — CEFTRIAXONE 1000 MG: 1 INJECTION, POWDER, FOR SOLUTION INTRAMUSCULAR; INTRAVENOUS at 15:27

## 2023-11-16 RX ADMIN — SODIUM CHLORIDE 900 MG: 9 INJECTION, SOLUTION INTRAVENOUS at 11:07

## 2023-11-16 RX ADMIN — DILTIAZEM HYDROCHLORIDE 60 MG: 60 TABLET, FILM COATED ORAL at 23:42

## 2023-11-16 RX ADMIN — ROSUVASTATIN CALCIUM 10 MG: 10 TABLET, COATED ORAL at 21:10

## 2023-11-16 RX ADMIN — IOPAMIDOL 100 ML: 755 INJECTION, SOLUTION INTRAVENOUS at 13:41

## 2023-11-16 RX ADMIN — AMIODARONE HYDROCHLORIDE 0.5 MG/MIN: 1.8 INJECTION, SOLUTION INTRAVENOUS at 17:06

## 2023-11-16 RX ADMIN — TEMAZEPAM 15 MG: 15 CAPSULE ORAL at 21:35

## 2023-11-16 RX ADMIN — IPRATROPIUM BROMIDE AND ALBUTEROL SULFATE 3 ML: 2.5; .5 SOLUTION RESPIRATORY (INHALATION) at 20:51

## 2023-11-16 RX ADMIN — FENOFIBRATE 145 MG: 145 TABLET ORAL at 21:10

## 2023-11-16 RX ADMIN — DULOXETINE HYDROCHLORIDE 60 MG: 30 CAPSULE, DELAYED RELEASE ORAL at 21:13

## 2023-11-16 RX ADMIN — GABAPENTIN 600 MG: 300 CAPSULE ORAL at 21:35

## 2023-11-16 RX ADMIN — RIVAROXABAN 20 MG: 20 TABLET, FILM COATED ORAL at 21:13

## 2023-11-16 RX ADMIN — DOXYCYCLINE 100 MG: 100 INJECTION, POWDER, LYOPHILIZED, FOR SOLUTION INTRAVENOUS at 21:23

## 2023-11-16 RX ADMIN — AMIODARONE HYDROCHLORIDE 150 MG: 1.5 INJECTION, SOLUTION INTRAVENOUS at 10:57

## 2023-11-16 RX ADMIN — BUMETANIDE 1 MG: 1 TABLET ORAL at 21:13

## 2023-11-16 RX ADMIN — SODIUM CHLORIDE 500 ML: 9 INJECTION, SOLUTION INTRAVENOUS at 11:21

## 2023-11-16 RX ADMIN — AMIODARONE HYDROCHLORIDE 1 MG/MIN: 1.8 INJECTION, SOLUTION INTRAVENOUS at 11:12

## 2023-11-16 RX ADMIN — TAMSULOSIN HYDROCHLORIDE 0.4 MG: 0.4 CAPSULE ORAL at 21:12

## 2023-11-16 RX ADMIN — Medication 5 MG/HR: at 18:17

## 2023-11-16 RX ADMIN — BUDESONIDE AND FORMOTEROL FUMARATE DIHYDRATE 2 PUFF: 160; 4.5 AEROSOL RESPIRATORY (INHALATION) at 22:38

## 2023-11-16 RX ADMIN — METOPROLOL TARTRATE 25 MG: 25 TABLET, FILM COATED ORAL at 21:12

## 2023-11-16 NOTE — ED NOTES
Nursing report ED to floor  Burt Mcdonald  73 y.o.  male    HPI:   Chief Complaint   Patient presents with    Atrial Fibrillation    Cough    Shortness of Breath       Admitting doctor:   Arjun Roman MD    Consulting provider(s):  Consults       No orders found from 10/18/2023 to 11/17/2023.             Admitting diagnosis:   The primary encounter diagnosis was Atrial fibrillation with rapid ventricular response. Diagnoses of Community acquired pneumonia, unspecified laterality and Pericardial effusion were also pertinent to this visit.    Code status:   Current Code Status       Date Active Code Status Order ID Comments User Context       Not on file            Allergies:   Patient has no known allergies.    Intake and Output    Intake/Output Summary (Last 24 hours) at 11/16/2023 1544  Last data filed at 11/16/2023 1214  Gross per 24 hour   Intake 550 ml   Output --   Net 550 ml       Weight:       11/16/23  1035   Weight: 84.8 kg (187 lb)       Most recent vitals:   Vitals:    11/16/23 1300 11/16/23 1310 11/16/23 1315 11/16/23 1533   BP: 105/70  101/74 90/62   Pulse: (!) 130 (!) 139 (!) 133 (!) 130   Resp:       Temp:       SpO2: 97% 94% 95% 98%   Weight:       Height:         Oxygen Therapy: .    Active LDAs/IV Access:   Lines, Drains & Airways       Active LDAs       Name Placement date Placement time Site Days    Peripheral IV Left;Posterior Wrist --  --  Wrist  --    Peripheral IV 11/16/23 Posterior;Right Wrist 11/16/23  --  Wrist  less than 1                    Labs (abnormal labs have a star):   Labs Reviewed   BASIC METABOLIC PANEL - Abnormal; Notable for the following components:       Result Value    Glucose 124 (*)     BUN 26 (*)     Creatinine 0.58 (*)     Chloride 97 (*)     CO2 32.0 (*)     BUN/Creatinine Ratio 44.8 (*)     All other components within normal limits    Narrative:     GFR Normal >60  Chronic Kidney Disease <60  Kidney Failure <15    The GFR formula is only valid for adults  with stable renal function between ages 18 and 70.   PROTIME-INR - Abnormal; Notable for the following components:    Protime 17.7 (*)     INR 1.44 (*)     All other components within normal limits   BNP (IN-HOUSE) - Abnormal; Notable for the following components:    proBNP 4,048.0 (*)     All other components within normal limits    Narrative:     This assay is used as an aid in the diagnosis of individuals suspected of having heart failure. It can be used as an aid in the diagnosis of acute decompensated heart failure (ADHF) in patients presenting with signs and symptoms of ADHF to the emergency department (ED). In addition, NT-proBNP of <300 pg/mL indicates ADHF is not likely.    Age Range Result Interpretation  NT-proBNP Concentration (pg/mL:      <50             Positive            >450                   Gray                 300-450                    Negative             <300    50-75           Positive            >900                  Gray                300-900                  Negative            <300      >75             Positive            >1800                  Gray                300-1800                  Negative            <300   D-DIMER, QUANTITATIVE - Abnormal; Notable for the following components:    D-Dimer, Quantitative 1.02 (*)     All other components within normal limits    Narrative:     According to the assay 's published package insert, a normal (<0.50 MCGFEU/mL) D-dimer result in conjunction with a non-high clinical probability assessment, excludes deep vein thrombosis (DVT) and pulmonary embolism (PE) with high sensitivity.    D-dimer values increase with age and this can make VTE exclusion of an older population difficult. To address this, the [employer], based on best available evidence and recent guidelines, recommends that clinicians use age-adjusted D-dimer thresholds in patients greater than 50 years of age with: a) a low probability of PE who do not  "meet all Pulmonary Embolism Rule Out Criteria, or b) in those with intermediate probability of PE.   The formula for an age-adjusted D-dimer cut-off is \"age/100\".  For example, a 60 year old patient would have an age-adjusted cut-off of 0.60 MCGFEU/mL and an 80 year old 0.80 MCGFEU/mL.   CBC WITH AUTO DIFFERENTIAL - Abnormal; Notable for the following components:    Hemoglobin 12.8 (*)     MCHC 29.6 (*)     RDW 15.5 (*)     Platelets 453 (*)     Lymphocyte % 13.4 (*)     Immature Grans % 0.7 (*)     All other components within normal limits   C-REACTIVE PROTEIN - Abnormal; Notable for the following components:    C-Reactive Protein 6.32 (*)     All other components within normal limits   COVID-19 AND FLU A/B, NP SWAB IN TRANSPORT MEDIA 1 HR TAT - Normal    Narrative:     Fact sheet for providers: https://www.fda.gov/media/882978/download    Fact sheet for patients: https://www.fda.gov/media/051381/download    Test performed by PCR.   SINGLE HSTROPONIN T - Normal    Narrative:     High Sensitive Troponin T Reference Range:  <14.0 ng/L- Negative Female for AMI  <22.0 ng/L- Negative Male for AMI  >=14 - Abnormal Female indicating possible myocardial injury.  >=22 - Abnormal Male indicating possible myocardial injury.   Clinicians would have to utilize clinical acumen, EKG, Troponin, and serial changes to determine if it is an Acute Myocardial Infarction or myocardial injury due to an underlying chronic condition.        TSH - Normal   T4, FREE - Normal    Narrative:     Results may be falsely increased if patient taking Biotin.     MAGNESIUM - Normal   PROCALCITONIN - Normal    Narrative:     As a Marker for Sepsis (Non-Neonates):    1. <0.5 ng/mL represents a low risk of severe sepsis and/or septic shock.  2. >2 ng/mL represents a high risk of severe sepsis and/or septic shock.    As a Marker for Lower Respiratory Tract Infections that require antibiotic therapy:    PCT on Admission    Antibiotic Therapy       6-12 " "Hrs later    >0.5                Strongly Recommended  >0.25 - <0.5        Recommended   0.1 - 0.25          Discouraged              Remeasure/reassess PCT  <0.1                Strongly Discouraged     Remeasure/reassess PCT    As 28 day mortality risk marker: \"Change in Procalcitonin Result\" (>80% or <=80%) if Day 0 (or Day 1) and Day 4 values are available. Refer to http://www.[facility] [place] [facility] [place]cal[place]    Change in PCT <=80%  A decrease of PCT levels below or equal to 80% defines a positive change in PCT test result representing a higher risk for 28-day all-cause mortality of patients diagnosed with severe sepsis for septic shock.    Change in PCT >80%  A decrease of PCT levels of more than 80% defines a negative change in PCT result representing a lower risk for 28-day all-cause mortality of patients diagnosed with severe sepsis or septic shock.      LACTIC ACID, PLASMA - Normal   COVID PRE-OP / PRE-PROCEDURE SCREENING ORDER (NO ISOLATION)    Narrative:     The following orders were created for panel order COVID PRE-OP / PRE-PROCEDURE SCREENING ORDER (NO ISOLATION) - Swab, Nasal Cavity.  Procedure                               Abnormality         Status                     ---------                               -----------         ------                     COVID-19 and FLU A/B PCR...[[id number]]  Normal              Final result                 Please view results for these tests on the individual orders.   BLOOD CULTURE   BLOOD CULTURE   CBC AND DIFFERENTIAL    Narrative:     The following orders were created for panel order CBC & Differential.  Procedure                               Abnormality         Status                     ---------                               -----------         ------                     CBC Auto Differential[[id number]]        Abnormal            Final result                 Please view results for these tests on the individual orders.       Meds given in ED:   Medications "   amiodarone 150 mg in 100 mL D5W (loading dose) (0 mg Intravenous Stopped 11/16/23 1112)     Followed by   amiodarone 360 mg in 200 mL D5W infusion (1 mg/min Intravenous New Bag 11/16/23 1112)     Followed by   amiodarone 360 mg in 200 mL D5W infusion (has no administration in time range)   cefTRIAXone (ROCEPHIN) 1,000 mg in sodium chloride 0.9 % 100 mL IVPB (1,000 mg Intravenous New Bag 11/16/23 1527)   sodium chloride 0.9 % bolus 500 mL (0 mL Intravenous Stopped 11/16/23 1158)   clindamycin (CLEOCIN) 900 mg in sodium chloride 0.9% 50 mL IVPB (premix) (0 mg Intravenous Stopped 11/16/23 1214)   iopamidol (ISOVUE-370) 76 % injection 100 mL (100 mL Intravenous Given 11/16/23 1341)     amiodarone, 1 mg/min, Last Rate: 1 mg/min (11/16/23 1112)   Followed by  amiodarone, 0.5 mg/min         NIH Stroke Scale:       Isolation/Infection(s):  No active isolations   No active infections     COVID Testing  Collected .  Resulted .    Nursing report ED to floor:  Mental status: A & O x4  Ambulatory status: Minimal assist  Precautions: .    ED nurse phone extentsion- ..

## 2023-11-16 NOTE — ED PROVIDER NOTES
Subjective   History of Present Illness  Patient is 73-year-old who came the echoing of A-fib.  Right lower extremity redness.  And some shortness of breath    Atrial Fibrillation  Location:  A-fib  Severity:  Moderate  Onset quality:  Gradual  Timing:  Constant  Progression:  Worsening  Chronicity:  Recurrent  Associated symptoms: cough and shortness of breath    Associated symptoms: no abdominal pain, no chest pain, no congestion, no diarrhea, no fever, no headaches, no loss of consciousness, no myalgias, no nausea, no rash, no rhinorrhea, no vomiting and no wheezing    Cough  Associated symptoms: shortness of breath    Associated symptoms: no chest pain, no fever, no headaches, no myalgias, no rash, no rhinorrhea and no wheezing    Shortness of Breath  Associated symptoms: cough    Associated symptoms: no abdominal pain, no chest pain, no fever, no headaches, no neck pain, no rash, no vomiting and no wheezing        Review of Systems   Constitutional: Negative.  Negative for fever.   HENT: Negative.  Negative for congestion and rhinorrhea.    Respiratory:  Positive for cough and shortness of breath. Negative for wheezing.    Cardiovascular:  Negative for chest pain.   Gastrointestinal: Negative.  Negative for abdominal distention, abdominal pain, diarrhea, nausea and vomiting.   Endocrine: Negative.    Genitourinary: Negative.    Musculoskeletal: Negative.  Negative for back pain, myalgias and neck pain.   Skin:  Negative for color change, pallor and rash.   Neurological: Negative.  Negative for loss of consciousness, syncope, weakness, light-headedness, numbness and headaches.   Hematological: Negative.  Does not bruise/bleed easily.   All other systems reviewed and are negative.      Past Medical History:   Diagnosis Date    A-fib     COPD (chronic obstructive pulmonary disease)     Elevated PSA     Hypercholesteremia     Hypertension        No Known Allergies    Past Surgical History:   Procedure Laterality  Date    BACK SURGERY      EYE SURGERY  01/2016    Bilateral cataract       Family History   Problem Relation Age of Onset    Cancer Father         LUNG    Cancer Mother         LUNG    No Known Problems Sister     No Known Problems Brother     No Known Problems Brother     No Known Problems Brother        Social History     Socioeconomic History    Marital status: Single   Tobacco Use    Smoking status: Every Day     Packs/day: 1     Types: Cigarettes    Smokeless tobacco: Current     Types: Chew   Vaping Use    Vaping Use: Never used   Substance and Sexual Activity    Alcohol use: Yes     Alcohol/week: 3.0 standard drinks of alcohol     Types: 2 Cans of beer, 1 Shots of liquor per week     Comment: daily    Drug use: No    Sexual activity: Defer           Objective   Physical Exam  Vitals and nursing note reviewed. Exam conducted with a chaperone present.   Constitutional:       General: He is not in acute distress.     Appearance: Normal appearance. He is ill-appearing.   HENT:      Head: Normocephalic.      Nose: Nose normal.      Mouth/Throat:      Mouth: Mucous membranes are moist.   Eyes:      Pupils: Pupils are equal, round, and reactive to light.   Cardiovascular:      Rate and Rhythm: Regular rhythm. Tachycardia present. No extrasystoles are present.     Chest Wall: PMI is not displaced.      Pulses: Normal pulses.      Heart sounds: Normal heart sounds. No murmur heard.     No gallop.   Pulmonary:      Effort: Pulmonary effort is normal. Tachypnea present. No respiratory distress or retractions.      Breath sounds: No stridor or decreased air movement. Examination of the right-lower field reveals rales. Examination of the left-lower field reveals rales. No decreased breath sounds or rales.   Chest:      Chest wall: No tenderness.   Abdominal:      General: Abdomen is flat. Bowel sounds are normal. There is no distension or abdominal bruit.      Palpations: Abdomen is soft. There is no mass or pulsatile  mass.      Tenderness: There is no abdominal tenderness.   Musculoskeletal:      Cervical back: Normal range of motion.      Right lower leg: Edema present.      Left lower leg: Edema present.   Skin:     General: Skin is warm.      Capillary Refill: Capillary refill takes less than 2 seconds.      Coloration: Skin is not cyanotic, mottled or pale.   Neurological:      General: No focal deficit present.      Mental Status: He is alert and oriented to person, place, and time. Mental status is at baseline.      Cranial Nerves: No cranial nerve deficit.      Sensory: No sensory deficit.      Motor: No weakness.      Coordination: Coordination normal.   Psychiatric:         Mood and Affect: Mood normal.         Thought Content: Thought content normal.         Procedures           ED Course  ED Course as of 11/16/23 1521   Thu Nov 16, 2023   1109 A-fib with RVR [TS]   1408 Patient states that he stays in A-fib in the past when they have cardioverted him he reverted back to A-fib insidiously and was noted on EKG.  He has not seen EP.  Therefore he was not cardioverted. [TS]   1408 Well score 4.5 [TS]   1408 BNP is elevated but there is no clinical evidence of CHF despite related to his A-fib. [TS]   1408  right lower lobe infiltrate. [TS]   1518 Well score 3 [TS]      ED Course User Index  [TS] Mykel Schmidt MD                                           Medical Decision Making  Differential Diagnosis:  I considered pulmonary etiology, asthma, chronic obstructive pulmonary disease, pneumonia, pulmonary embolism, adult respiratory distress syndrome, pneumothorax, pleural effusion, pulmonary fibrosis, cardiac etiology, congestive heart failure, myocardial infarction, metabolic etiology, diabetic ketoacidosis, uremia, acidosis, sepsis, anemia, drug related etiology, hyperventilation and CNS disease as a possible cause of dyspnea in this patient. This is a partial list of diagnoses considered.           Problems  Addressed:  Atrial fibrillation with rapid ventricular response: complicated acute illness or injury     Details: Patient has got a history of paroxysmal A-fib in the past also.  Not responding well to cardioversion in the past.  Was placed amiodarone drip already is anticoagulated.  Community acquired pneumonia, unspecified laterality: complicated acute illness or injury     Details: Has got a possibly a pneumonia was given IV antibiotics for which.  Pericardial effusion: undiagnosed new problem with uncertain prognosis     Details: Mild to moderate pericardial effusion a 2D echo has been ordered.  Patient has not any evidence of tamponade at this time.    Amount and/or Complexity of Data Reviewed  Labs: ordered.     Details: Labs were ordered and reviewed  Radiology: ordered.     Details: History is reviewed CT scan of the chest reviewed  ECG/medicine tests: ordered.     Details: Nonischemic EKG  Discussion of management or test interpretation with external provider(s): Surya with Dr. Ferrari and with cardiology    Risk  Prescription drug management.  Decision regarding hospitalization.  Risk Details: Patient is good to be admitted to medicine service further evaluation and treatment.        Final diagnoses:   Atrial fibrillation with rapid ventricular response   Community acquired pneumonia, unspecified laterality   Pericardial effusion       ED Disposition  ED Disposition       ED Disposition   Decision to Admit    Condition   --    Comment   Level of Care: Telemetry [5]   Diagnosis: Atrial fibrillation with rapid ventricular response [[id number]]   Admitting Physician: BEVERLY HICKEY [[id number]]   Attending Physician: BEVERLY HICKEY [[id number]]   Certification: I Certify That Inpatient Hospital Services Are Medically Necessary For Greater Than 2 Midnights                 No follow-up provider specified.       Medication List      No changes were made to your prescriptions during this visit.            Mykel Schmidt,  MD  11/16/23 1038       Mykel Schmidt MD  11/16/23 3550

## 2023-11-17 LAB
ALBUMIN SERPL-MCNC: 2.9 G/DL (ref 3.5–5.2)
ALBUMIN/GLOB SERPL: 0.9 G/DL
ALP SERPL-CCNC: 68 U/L (ref 39–117)
ALT SERPL W P-5'-P-CCNC: 17 U/L (ref 1–41)
ANION GAP SERPL CALCULATED.3IONS-SCNC: 5 MMOL/L (ref 5–15)
AST SERPL-CCNC: 31 U/L (ref 1–40)
BASOPHILS # BLD AUTO: 0.02 10*3/MM3 (ref 0–0.2)
BASOPHILS NFR BLD AUTO: 0.4 % (ref 0–1.5)
BILIRUB SERPL-MCNC: 0.5 MG/DL (ref 0–1.2)
BUN SERPL-MCNC: 16 MG/DL (ref 8–23)
BUN/CREAT SERPL: 25.8 (ref 7–25)
CALCIUM SPEC-SCNC: 8.7 MG/DL (ref 8.6–10.5)
CHLORIDE SERPL-SCNC: 98 MMOL/L (ref 98–107)
CO2 SERPL-SCNC: 35 MMOL/L (ref 22–29)
CREAT SERPL-MCNC: 0.62 MG/DL (ref 0.76–1.27)
DEPRECATED RDW RBC AUTO: 52.1 FL (ref 37–54)
EGFRCR SERPLBLD CKD-EPI 2021: 100.9 ML/MIN/1.73
EOSINOPHIL # BLD AUTO: 0.04 10*3/MM3 (ref 0–0.4)
EOSINOPHIL NFR BLD AUTO: 0.7 % (ref 0.3–6.2)
ERYTHROCYTE [DISTWIDTH] IN BLOOD BY AUTOMATED COUNT: 15.5 % (ref 12.3–15.4)
GLOBULIN UR ELPH-MCNC: 3.1 GM/DL
GLUCOSE SERPL-MCNC: 115 MG/DL (ref 65–99)
HCT VFR BLD AUTO: 38.6 % (ref 37.5–51)
HGB BLD-MCNC: 11.7 G/DL (ref 13–17.7)
IMM GRANULOCYTES # BLD AUTO: 0.04 10*3/MM3 (ref 0–0.05)
IMM GRANULOCYTES NFR BLD AUTO: 0.7 % (ref 0–0.5)
LYMPHOCYTES # BLD AUTO: 1.07 10*3/MM3 (ref 0.7–3.1)
LYMPHOCYTES NFR BLD AUTO: 18.8 % (ref 19.6–45.3)
MAGNESIUM SERPL-MCNC: 1.8 MG/DL (ref 1.6–2.4)
MCH RBC QN AUTO: 27.9 PG (ref 26.6–33)
MCHC RBC AUTO-ENTMCNC: 30.3 G/DL (ref 31.5–35.7)
MCV RBC AUTO: 92.1 FL (ref 79–97)
MONOCYTES # BLD AUTO: 0.94 10*3/MM3 (ref 0.1–0.9)
MONOCYTES NFR BLD AUTO: 16.5 % (ref 5–12)
NEUTROPHILS NFR BLD AUTO: 3.57 10*3/MM3 (ref 1.7–7)
NEUTROPHILS NFR BLD AUTO: 62.9 % (ref 42.7–76)
NRBC BLD AUTO-RTO: 0 /100 WBC (ref 0–0.2)
NT-PROBNP SERPL-MCNC: 2561 PG/ML (ref 0–900)
PLATELET # BLD AUTO: 431 10*3/MM3 (ref 140–450)
PMV BLD AUTO: 10 FL (ref 6–12)
POTASSIUM SERPL-SCNC: 4.2 MMOL/L (ref 3.5–5.2)
PROT SERPL-MCNC: 6 G/DL (ref 6–8.5)
QT INTERVAL: 280 MS
QT INTERVAL: 300 MS
QTC INTERVAL: 402 MS
QTC INTERVAL: 448 MS
RBC # BLD AUTO: 4.19 10*6/MM3 (ref 4.14–5.8)
SODIUM SERPL-SCNC: 138 MMOL/L (ref 136–145)
WBC NRBC COR # BLD AUTO: 5.68 10*3/MM3 (ref 3.4–10.8)

## 2023-11-17 PROCEDURE — 99232 SBSQ HOSP IP/OBS MODERATE 35: CPT | Performed by: STUDENT IN AN ORGANIZED HEALTH CARE EDUCATION/TRAINING PROGRAM

## 2023-11-17 PROCEDURE — 83735 ASSAY OF MAGNESIUM: CPT | Performed by: STUDENT IN AN ORGANIZED HEALTH CARE EDUCATION/TRAINING PROGRAM

## 2023-11-17 PROCEDURE — 94799 UNLISTED PULMONARY SVC/PX: CPT

## 2023-11-17 PROCEDURE — 25010000002 FUROSEMIDE PER 20 MG: Performed by: STUDENT IN AN ORGANIZED HEALTH CARE EDUCATION/TRAINING PROGRAM

## 2023-11-17 PROCEDURE — 25010000002 MAGNESIUM SULFATE 2 GM/50ML SOLUTION: Performed by: FAMILY MEDICINE

## 2023-11-17 PROCEDURE — 25010000002 FUROSEMIDE PER 20 MG: Performed by: FAMILY MEDICINE

## 2023-11-17 PROCEDURE — 83880 ASSAY OF NATRIURETIC PEPTIDE: CPT | Performed by: FAMILY MEDICINE

## 2023-11-17 PROCEDURE — 25010000002 CEFTRIAXONE PER 250 MG: Performed by: FAMILY MEDICINE

## 2023-11-17 PROCEDURE — 80053 COMPREHEN METABOLIC PANEL: CPT | Performed by: FAMILY MEDICINE

## 2023-11-17 PROCEDURE — 94664 DEMO&/EVAL PT USE INHALER: CPT

## 2023-11-17 PROCEDURE — 85025 COMPLETE CBC W/AUTO DIFF WBC: CPT | Performed by: FAMILY MEDICINE

## 2023-11-17 RX ORDER — MAGNESIUM SULFATE HEPTAHYDRATE 40 MG/ML
2 INJECTION, SOLUTION INTRAVENOUS ONCE
Status: COMPLETED | OUTPATIENT
Start: 2023-11-17 | End: 2023-11-17

## 2023-11-17 RX ORDER — MAGNESIUM SULFATE 1 G/100ML
2 INJECTION INTRAVENOUS ONCE
Status: DISCONTINUED | OUTPATIENT
Start: 2023-11-17 | End: 2023-11-17

## 2023-11-17 RX ORDER — MAGNESIUM SULFATE HEPTAHYDRATE 40 MG/ML
2 INJECTION, SOLUTION INTRAVENOUS ONCE
Status: DISCONTINUED | OUTPATIENT
Start: 2023-11-17 | End: 2023-11-17

## 2023-11-17 RX ORDER — DILTIAZEM HCL 90 MG
90 TABLET ORAL EVERY 6 HOURS SCHEDULED
Status: DISCONTINUED | OUTPATIENT
Start: 2023-11-17 | End: 2023-11-20

## 2023-11-17 RX ORDER — DIGOXIN 125 MCG
125 TABLET ORAL
Status: DISCONTINUED | OUTPATIENT
Start: 2023-11-18 | End: 2023-11-21 | Stop reason: HOSPADM

## 2023-11-17 RX ADMIN — Medication 5 MG/HR: at 18:34

## 2023-11-17 RX ADMIN — MAGNESIUM SULFATE HEPTAHYDRATE 2 G: 2 INJECTION, SOLUTION INTRAVENOUS at 21:34

## 2023-11-17 RX ADMIN — GABAPENTIN 600 MG: 300 CAPSULE ORAL at 15:01

## 2023-11-17 RX ADMIN — BUDESONIDE AND FORMOTEROL FUMARATE DIHYDRATE 2 PUFF: 160; 4.5 AEROSOL RESPIRATORY (INHALATION) at 20:47

## 2023-11-17 RX ADMIN — CEFTRIAXONE 1000 MG: 1 INJECTION, POWDER, FOR SOLUTION INTRAMUSCULAR; INTRAVENOUS at 15:02

## 2023-11-17 RX ADMIN — GABAPENTIN 600 MG: 300 CAPSULE ORAL at 05:27

## 2023-11-17 RX ADMIN — GABAPENTIN 600 MG: 300 CAPSULE ORAL at 21:26

## 2023-11-17 RX ADMIN — DILTIAZEM HYDROCHLORIDE 60 MG: 60 TABLET, FILM COATED ORAL at 11:42

## 2023-11-17 RX ADMIN — DOXYCYCLINE 100 MG: 100 INJECTION, POWDER, LYOPHILIZED, FOR SOLUTION INTRAVENOUS at 09:19

## 2023-11-17 RX ADMIN — HYDROCODONE BITARTRATE AND ACETAMINOPHEN 1 TABLET: 10; 325 TABLET ORAL at 15:01

## 2023-11-17 RX ADMIN — LISINOPRIL 40 MG: 20 TABLET ORAL at 08:42

## 2023-11-17 RX ADMIN — FUROSEMIDE 10 MG/HR: 10 INJECTION, SOLUTION INTRAVENOUS at 20:35

## 2023-11-17 RX ADMIN — TAMSULOSIN HYDROCHLORIDE 0.4 MG: 0.4 CAPSULE ORAL at 08:42

## 2023-11-17 RX ADMIN — IPRATROPIUM BROMIDE AND ALBUTEROL SULFATE 3 ML: 2.5; .5 SOLUTION RESPIRATORY (INHALATION) at 10:16

## 2023-11-17 RX ADMIN — PANTOPRAZOLE SODIUM 40 MG: 40 TABLET, DELAYED RELEASE ORAL at 05:27

## 2023-11-17 RX ADMIN — IPRATROPIUM BROMIDE AND ALBUTEROL SULFATE 3 ML: 2.5; .5 SOLUTION RESPIRATORY (INHALATION) at 06:26

## 2023-11-17 RX ADMIN — RIVAROXABAN 20 MG: 20 TABLET, FILM COATED ORAL at 18:10

## 2023-11-17 RX ADMIN — Medication 7.5 MG/HR: at 12:21

## 2023-11-17 RX ADMIN — TEMAZEPAM 15 MG: 15 CAPSULE ORAL at 21:34

## 2023-11-17 RX ADMIN — DILTIAZEM HYDROCHLORIDE 90 MG: 90 TABLET, FILM COATED ORAL at 18:36

## 2023-11-17 RX ADMIN — FUROSEMIDE 60 MG: 10 INJECTION, SOLUTION INTRAMUSCULAR; INTRAVENOUS at 15:01

## 2023-11-17 RX ADMIN — ROSUVASTATIN CALCIUM 10 MG: 10 TABLET, COATED ORAL at 08:42

## 2023-11-17 RX ADMIN — IPRATROPIUM BROMIDE AND ALBUTEROL SULFATE 3 ML: 2.5; .5 SOLUTION RESPIRATORY (INHALATION) at 20:47

## 2023-11-17 RX ADMIN — FUROSEMIDE 60 MG: 10 INJECTION, SOLUTION INTRAMUSCULAR; INTRAVENOUS at 08:41

## 2023-11-17 RX ADMIN — BUDESONIDE AND FORMOTEROL FUMARATE DIHYDRATE 2 PUFF: 160; 4.5 AEROSOL RESPIRATORY (INHALATION) at 06:27

## 2023-11-17 RX ADMIN — AMLODIPINE BESYLATE 10 MG: 10 TABLET ORAL at 08:42

## 2023-11-17 RX ADMIN — DULOXETINE HYDROCHLORIDE 60 MG: 30 CAPSULE, DELAYED RELEASE ORAL at 08:43

## 2023-11-17 RX ADMIN — DILTIAZEM HYDROCHLORIDE 60 MG: 60 TABLET, FILM COATED ORAL at 05:27

## 2023-11-17 RX ADMIN — DOXYCYCLINE 100 MG: 100 INJECTION, POWDER, LYOPHILIZED, FOR SOLUTION INTRAVENOUS at 20:26

## 2023-11-17 RX ADMIN — FENOFIBRATE 145 MG: 145 TABLET ORAL at 08:43

## 2023-11-17 NOTE — CONSULTS
EP CONSULT NOTE    Subjective        History of Present Illness    EP Problems:  1.  Longstanding persistent atrial fibrillation    Cardiology Problems:  1.  Hypertension  2.  Chronic diastolic heart failure  3.  Moderate pericardial effusion  4.  Likely pulmonary hypertension    Medical Problems:  1.  COPD on home oxygen  2.  Tobacco use disorder  3.  Alcohol use disorder  4.  Lumbar stenosis    Burt Mcdonald is a 73 y.o. male with problem list as above for whom EP is consulted regarding longstanding persistent atrial fibrillation, acute on chronic diastolic heart failure.  He follows with general cardiology and has had longstanding persistent atrial fibrillation with last attempted cardioversion in May 2021.  He believes that he was in rhythm for approximately 1 week with this cardioversion.  He endorses that he initially did not feel any better or worse with the cardioversion and as such procedure rate control strategy moving forward.  He has generally been well controlled in terms of his rates with clinic visits showing heart rates typically in the 90s.  He states that approximately 3 weeks ago, his symptoms started worsening with increased shortness of breath and fatigue.  He has also had associated symptoms of progressive lower extremity edema.  He denies any significant change to his medications though does endorse that he started a new inhaler for COPD relatively recently.      Family History:  family history includes Cancer in his father and mother; No Known Problems in his brother, brother, brother, and sister.    Social History:  Social History     Tobacco Use    Smoking status: Every Day     Packs/day: 1     Types: Cigarettes    Smokeless tobacco: Current     Types: Chew   Vaping Use    Vaping Use: Never used   Substance Use Topics    Alcohol use: Yes     Alcohol/week: 3.0 standard drinks of alcohol     Types: 2 Cans of beer, 1 Shots of liquor per week     Comment: daily    Drug use: No  "      Allergies:  Patient has no known allergies.      Objective   Vital Signs:  /79 (BP Location: Left arm, Patient Position: Lying)   Pulse (!) 124   Temp 98 °F (36.7 °C) (Oral)   Resp 18   Ht 170.2 cm (67\")   Wt 84.8 kg (187 lb)   SpO2 98%   BMI 29.29 kg/m²   Estimated body mass index is 29.29 kg/m² as calculated from the following:    Height as of this encounter: 170.2 cm (67\").    Weight as of this encounter: 84.8 kg (187 lb).      Physical Exam  Vitals reviewed.   Constitutional:       Comments: Chronically ill-appearing   HENT:      Head: Normocephalic and atraumatic.   Eyes:      Extraocular Movements: Extraocular movements intact.      Conjunctiva/sclera: Conjunctivae normal.   Cardiovascular:      Rate and Rhythm: Tachycardia present. Rhythm irregular.   Pulmonary:      Effort: Pulmonary effort is normal.   Musculoskeletal:         General: Swelling present.   Neurological:      Mental Status: He is alert.          Result Review :  The following data was reviewed by: Amy Chou MD on 11/16/2023:  CMP          11/16/2023    11:07   CMP   Glucose 124    BUN 26    Creatinine 0.58    EGFR 103.0    Sodium 136    Potassium 4.7    Chloride 97    Calcium 9.1    BUN/Creatinine Ratio 44.8    Anion Gap 7.0      CBC          11/16/2023    11:07   CBC   WBC 7.30    RBC 4.61    Hemoglobin 12.8    Hematocrit 43.2    MCV 93.7    MCH 27.8    MCHC 29.6    RDW 15.5    Platelets 453      TSH          11/16/2023    11:07   TSH   TSH 1.360      BNP 4048    [id number] SCORE   [id number] Score: 2 (11/16/2023 10:09 PM)             Assessment and Plan   Problems:  Longstanding persistent atrial fibrillation, acute exacerbation with RVR  Acute on chronic diastolic heart failure    Burt Mcdonald is a 73 y.o. male with problem list as above for whom EP is consulted regarding longstanding persistent atrial fibrillation with acute exacerbation, acute on chronic diastolic heart failure.  It is unclear exactly why " he had an acute exacerbation of his RVR given that he has generally been well controlled with his rates in the past.  Regardless, at this time, I think that his only real treatment options are consideration of ablation, further attempts at rate control, or pacemaker/AV node ablation.  I estimate that his success rates of successful ablation given that he has longstanding persistent atrial fibrillation heart and approximate 50% chance of maintenance of sinus rhythm for over a year.  He also does have some increased risks of procedural complications given his underlying COPD.  Despite this, ablation might still be a reasonable option so long as he understands that there is a high chance of procedural effectiveness in his particular case.  With this in mind, he states that he at this time would prefer not to take an ablative approach and would rather try treatment medications.    As such, we will plan to stop his metoprolol and put him on higher dose oral diltiazem.  This will hopefully be better tolerated in combination with his COPD.  Should he be able to achieve effective rate control, then I do think that this is a reasonable strategy for him moving forward.  If he has difficulty with rate control or continues to have worsening symptoms despite effective rate control, then permanent pacemaker and AV node ablation would be the next appropriate step to follow.    Plan:  -Stop metoprolol  -Start diltiazem 60 mg p.o. every 6 hours  -Continue diltiazem infusion for now  -Furosemide IV 60 mg twice daily to start tomorrow for volume overload  -BMP and mag daily for furosemide monitoring  -Awaiting echocardiogram results  -Continue anticoagulation given elevated [id number]                     Part of this note may be an electronic transcription/translation of spoken language to printed text using the Dragon Dictation System.

## 2023-11-17 NOTE — CASE MANAGEMENT/SOCIAL WORK
Discharge Planning Assessment  [facility]     Patient Name: Burt Mcdonald  MRN: [id number]  Today's Date: 11/17/2023    Admit Date: 11/16/2023        Discharge Needs Assessment       Row Name 11/17/23 1333       Living Environment    People in Home significant other    Potentially Unsafe Housing Conditions none    Primary Care Provided by self    Provides Primary Care For no one    Family Caregiver if Needed significant other;child(bonifacio), adult    Quality of Family Relationships helpful;involved;supportive    Able to Return to Prior Arrangements yes       Resource/Environmental Concerns    Resource/Environmental Concerns none       Transition Planning    Patient/Family Anticipates Transition to home with family    Patient/Family Anticipated Services at Transition home health care  Pt states he is current with [facility].  They will need resumption orders upon dc.    Transportation Anticipated family or friend will provide       Discharge Needs Assessment    Readmission Within the Last 30 Days no previous admission in last 30 days    Equipment Currently Used at Home cane, quad;walker, rolling;oxygen  Pt wears home 02 at night; he is unsure which DME company this is from.    Concerns to be Addressed no discharge needs identified    Anticipated Changes Related to Illness none    Equipment Needed After Discharge none    Current Discharge Risk chronically ill    Discharge Coordination/Progress Pt resides with sig. other.  Pt has PCP, RX coverage and can afford medications.  Pt plans to return home and resume [facility].  SW will follow.                   Discharge Plan    No documentation.                 Continued Care and Services - Admitted Since 11/16/2023    Coordination has not been started for this encounter.          Demographic Summary    No documentation.                  Functional Status    No documentation.                  Psychosocial    No documentation.                   Abuse/Neglect    No documentation.                  Legal    No documentation.                  Substance Abuse    No documentation.                  Patient Forms    No documentation.                     BRUCE CrouchW

## 2023-11-17 NOTE — PLAN OF CARE
Problem: Adult Inpatient Plan of Care  Goal: Plan of Care Review  Outcome: Ongoing, Progressing  Flowsheets (Taken 11/17/2023 0357)  Plan of Care Reviewed With: patient  Goal: Patient-Specific Goal (Individualized)  Outcome: Ongoing, Progressing  Goal: Absence of Hospital-Acquired Illness or Injury  Outcome: Ongoing, Progressing  Intervention: Identify and Manage Fall Risk  Recent Flowsheet Documentation  Taken 11/17/2023 0200 by Kade Clayton RN  Safety Promotion/Fall Prevention: safety round/check completed  Taken 11/17/2023 0000 by Kade Clayton RN  Safety Promotion/Fall Prevention: safety round/check completed  Taken 11/16/2023 2200 by Kade Clayton RN  Safety Promotion/Fall Prevention: safety round/check completed  Taken 11/16/2023 2100 by Kade Clayton RN  Safety Promotion/Fall Prevention: safety round/check completed  Taken 11/16/2023 2000 by Kade Clayton RN  Safety Promotion/Fall Prevention: safety round/check completed  Taken 11/16/2023 1900 by Kade Clayton RN  Safety Promotion/Fall Prevention: safety round/check completed  Intervention: Prevent Skin Injury  Recent Flowsheet Documentation  Taken 11/17/2023 0200 by Kade Clayton RN  Body Position: position changed independently  Taken 11/17/2023 0000 by Kade Clayton RN  Body Position: position changed independently  Taken 11/16/2023 2200 by Kade Clayton RN  Body Position: position changed independently  Taken 11/16/2023 2100 by Kade Clayton RN  Body Position: position changed independently  Taken 11/16/2023 2000 by Kade Clayton RN  Body Position: position changed independently  Taken 11/16/2023 1900 by Kade Clayton RN  Body Position: position changed independently  Goal: Optimal Comfort and Wellbeing  Outcome: Ongoing, Progressing  Goal: Readiness for Transition of Care  Outcome: Ongoing, Progressing     Problem: Heart Failure Comorbidity  Goal: Maintenance of Heart Failure Symptom Control  Outcome: Ongoing,  Progressing     Problem: Hypertension Comorbidity  Goal: Blood Pressure in Desired Range  Outcome: Ongoing, Progressing     Problem: Adjustment to Illness (Sepsis/Septic Shock)  Goal: Optimal Coping  Outcome: Ongoing, Progressing     Problem: Bleeding (Sepsis/Septic Shock)  Goal: Absence of Bleeding  Outcome: Ongoing, Progressing     Problem: Glycemic Control Impaired (Sepsis/Septic Shock)  Goal: Blood Glucose Level Within Desired Range  Outcome: Ongoing, Progressing     Problem: Infection Progression (Sepsis/Septic Shock)  Goal: Absence of Infection Signs and Symptoms  Outcome: Ongoing, Progressing     Problem: Nutrition Impaired (Sepsis/Septic Shock)  Goal: Optimal Nutrition Intake  Outcome: Ongoing, Progressing   Goal Outcome Evaluation:  Plan of Care Reviewed With: patient    Pt a&ox4, resp even and unlabored, on 2LNC. Cardizem gtt @ 5 mg/hr.  - 139 on monitor.

## 2023-11-18 LAB
ALBUMIN SERPL-MCNC: 2.7 G/DL (ref 3.5–5.2)
ALBUMIN/GLOB SERPL: 0.9 G/DL
ALP SERPL-CCNC: 68 U/L (ref 39–117)
ALT SERPL W P-5'-P-CCNC: 13 U/L (ref 1–41)
ANION GAP SERPL CALCULATED.3IONS-SCNC: 6 MMOL/L (ref 5–15)
AST SERPL-CCNC: 23 U/L (ref 1–40)
BASOPHILS # BLD AUTO: 0.02 10*3/MM3 (ref 0–0.2)
BASOPHILS NFR BLD AUTO: 0.4 % (ref 0–1.5)
BILIRUB SERPL-MCNC: 0.4 MG/DL (ref 0–1.2)
BUN SERPL-MCNC: 14 MG/DL (ref 8–23)
BUN/CREAT SERPL: 19.2 (ref 7–25)
CALCIUM SPEC-SCNC: 8.5 MG/DL (ref 8.6–10.5)
CHLORIDE SERPL-SCNC: 98 MMOL/L (ref 98–107)
CO2 SERPL-SCNC: 37 MMOL/L (ref 22–29)
CREAT SERPL-MCNC: 0.73 MG/DL (ref 0.76–1.27)
DEPRECATED RDW RBC AUTO: 52.6 FL (ref 37–54)
EGFRCR SERPLBLD CKD-EPI 2021: 96.1 ML/MIN/1.73
EOSINOPHIL # BLD AUTO: 0.04 10*3/MM3 (ref 0–0.4)
EOSINOPHIL NFR BLD AUTO: 0.7 % (ref 0.3–6.2)
ERYTHROCYTE [DISTWIDTH] IN BLOOD BY AUTOMATED COUNT: 15.4 % (ref 12.3–15.4)
GEN 5 2HR TROPONIN T REFLEX: 20 NG/L
GLOBULIN UR ELPH-MCNC: 3 GM/DL
GLUCOSE SERPL-MCNC: 125 MG/DL (ref 65–99)
HCT VFR BLD AUTO: 36.3 % (ref 37.5–51)
HGB BLD-MCNC: 10.9 G/DL (ref 13–17.7)
IMM GRANULOCYTES # BLD AUTO: 0.04 10*3/MM3 (ref 0–0.05)
IMM GRANULOCYTES NFR BLD AUTO: 0.7 % (ref 0–0.5)
LYMPHOCYTES # BLD AUTO: 1.53 10*3/MM3 (ref 0.7–3.1)
LYMPHOCYTES NFR BLD AUTO: 26.8 % (ref 19.6–45.3)
MCH RBC QN AUTO: 27.8 PG (ref 26.6–33)
MCHC RBC AUTO-ENTMCNC: 30 G/DL (ref 31.5–35.7)
MCV RBC AUTO: 92.6 FL (ref 79–97)
MONOCYTES # BLD AUTO: 0.99 10*3/MM3 (ref 0.1–0.9)
MONOCYTES NFR BLD AUTO: 17.4 % (ref 5–12)
NEUTROPHILS NFR BLD AUTO: 3.08 10*3/MM3 (ref 1.7–7)
NEUTROPHILS NFR BLD AUTO: 54 % (ref 42.7–76)
NRBC BLD AUTO-RTO: 0 /100 WBC (ref 0–0.2)
PLATELET # BLD AUTO: 440 10*3/MM3 (ref 140–450)
PMV BLD AUTO: 9.4 FL (ref 6–12)
POTASSIUM SERPL-SCNC: 3.7 MMOL/L (ref 3.5–5.2)
PROT SERPL-MCNC: 5.7 G/DL (ref 6–8.5)
RBC # BLD AUTO: 3.92 10*6/MM3 (ref 4.14–5.8)
SODIUM SERPL-SCNC: 141 MMOL/L (ref 136–145)
TROPONIN T DELTA: -1 NG/L
TROPONIN T SERPL HS-MCNC: 21 NG/L
WBC NRBC COR # BLD AUTO: 5.7 10*3/MM3 (ref 3.4–10.8)

## 2023-11-18 PROCEDURE — 94799 UNLISTED PULMONARY SVC/PX: CPT

## 2023-11-18 PROCEDURE — 84484 ASSAY OF TROPONIN QUANT: CPT | Performed by: FAMILY MEDICINE

## 2023-11-18 PROCEDURE — 25010000002 THIAMINE PER 100 MG: Performed by: FAMILY MEDICINE

## 2023-11-18 PROCEDURE — 85025 COMPLETE CBC W/AUTO DIFF WBC: CPT | Performed by: FAMILY MEDICINE

## 2023-11-18 PROCEDURE — 25010000002 FUROSEMIDE PER 20 MG: Performed by: FAMILY MEDICINE

## 2023-11-18 PROCEDURE — 25010000002 CEFTRIAXONE PER 250 MG: Performed by: FAMILY MEDICINE

## 2023-11-18 PROCEDURE — 80053 COMPREHEN METABOLIC PANEL: CPT | Performed by: FAMILY MEDICINE

## 2023-11-18 PROCEDURE — 99232 SBSQ HOSP IP/OBS MODERATE 35: CPT | Performed by: STUDENT IN AN ORGANIZED HEALTH CARE EDUCATION/TRAINING PROGRAM

## 2023-11-18 PROCEDURE — 94664 DEMO&/EVAL PT USE INHALER: CPT

## 2023-11-18 RX ORDER — LORAZEPAM 1 MG/1
2 TABLET ORAL EVERY 6 HOURS
Status: COMPLETED | OUTPATIENT
Start: 2023-11-18 | End: 2023-11-19

## 2023-11-18 RX ORDER — DILTIAZEM HCL 90 MG
90 TABLET ORAL ONCE
Status: COMPLETED | OUTPATIENT
Start: 2023-11-18 | End: 2023-11-18

## 2023-11-18 RX ORDER — THIAMINE HYDROCHLORIDE 100 MG/ML
200 INJECTION, SOLUTION INTRAMUSCULAR; INTRAVENOUS EVERY 8 HOURS SCHEDULED
Status: DISCONTINUED | OUTPATIENT
Start: 2023-11-18 | End: 2023-11-21 | Stop reason: HOSPADM

## 2023-11-18 RX ORDER — FOLIC ACID 1 MG/1
1 TABLET ORAL DAILY
Status: DISCONTINUED | OUTPATIENT
Start: 2023-11-18 | End: 2023-11-21 | Stop reason: HOSPADM

## 2023-11-18 RX ORDER — LORAZEPAM 1 MG/1
1 TABLET ORAL EVERY 6 HOURS
Status: COMPLETED | OUTPATIENT
Start: 2023-11-19 | End: 2023-11-20

## 2023-11-18 RX ADMIN — RIVAROXABAN 20 MG: 20 TABLET, FILM COATED ORAL at 17:56

## 2023-11-18 RX ADMIN — PANTOPRAZOLE SODIUM 40 MG: 40 TABLET, DELAYED RELEASE ORAL at 05:26

## 2023-11-18 RX ADMIN — GABAPENTIN 600 MG: 300 CAPSULE ORAL at 05:26

## 2023-11-18 RX ADMIN — FUROSEMIDE 10 MG/HR: 10 INJECTION, SOLUTION INTRAVENOUS at 09:13

## 2023-11-18 RX ADMIN — DOXYCYCLINE 100 MG: 100 INJECTION, POWDER, LYOPHILIZED, FOR SOLUTION INTRAVENOUS at 06:04

## 2023-11-18 RX ADMIN — DILTIAZEM HYDROCHLORIDE 90 MG: 90 TABLET, FILM COATED ORAL at 13:02

## 2023-11-18 RX ADMIN — TAMSULOSIN HYDROCHLORIDE 0.4 MG: 0.4 CAPSULE ORAL at 08:09

## 2023-11-18 RX ADMIN — BUDESONIDE AND FORMOTEROL FUMARATE DIHYDRATE 2 PUFF: 160; 4.5 AEROSOL RESPIRATORY (INHALATION) at 20:15

## 2023-11-18 RX ADMIN — FOLIC ACID 1 MG: 1 TABLET ORAL at 08:09

## 2023-11-18 RX ADMIN — IPRATROPIUM BROMIDE AND ALBUTEROL SULFATE 3 ML: 2.5; .5 SOLUTION RESPIRATORY (INHALATION) at 06:59

## 2023-11-18 RX ADMIN — LORAZEPAM 2 MG: 1 TABLET ORAL at 15:35

## 2023-11-18 RX ADMIN — THIAMINE HYDROCHLORIDE 200 MG: 100 INJECTION, SOLUTION INTRAMUSCULAR; INTRAVENOUS at 21:00

## 2023-11-18 RX ADMIN — FENOFIBRATE 145 MG: 145 TABLET ORAL at 08:09

## 2023-11-18 RX ADMIN — GABAPENTIN 600 MG: 300 CAPSULE ORAL at 13:05

## 2023-11-18 RX ADMIN — DULOXETINE HYDROCHLORIDE 60 MG: 30 CAPSULE, DELAYED RELEASE ORAL at 08:09

## 2023-11-18 RX ADMIN — THIAMINE HYDROCHLORIDE 200 MG: 100 INJECTION, SOLUTION INTRAMUSCULAR; INTRAVENOUS at 08:06

## 2023-11-18 RX ADMIN — ROSUVASTATIN CALCIUM 10 MG: 10 TABLET, COATED ORAL at 08:09

## 2023-11-18 RX ADMIN — DILTIAZEM HYDROCHLORIDE 90 MG: 90 TABLET, FILM COATED ORAL at 09:05

## 2023-11-18 RX ADMIN — THIAMINE HYDROCHLORIDE 200 MG: 100 INJECTION, SOLUTION INTRAMUSCULAR; INTRAVENOUS at 13:05

## 2023-11-18 RX ADMIN — IPRATROPIUM BROMIDE AND ALBUTEROL SULFATE 3 ML: 2.5; .5 SOLUTION RESPIRATORY (INHALATION) at 20:08

## 2023-11-18 RX ADMIN — CEFTRIAXONE 1000 MG: 1 INJECTION, POWDER, FOR SOLUTION INTRAMUSCULAR; INTRAVENOUS at 15:47

## 2023-11-18 RX ADMIN — DOXYCYCLINE 100 MG: 100 INJECTION, POWDER, LYOPHILIZED, FOR SOLUTION INTRAVENOUS at 18:57

## 2023-11-18 RX ADMIN — FUROSEMIDE 10 MG/HR: 10 INJECTION, SOLUTION INTRAVENOUS at 13:07

## 2023-11-18 RX ADMIN — LORAZEPAM 2 MG: 1 TABLET ORAL at 08:04

## 2023-11-18 RX ADMIN — DIGOXIN 125 MCG: 125 TABLET ORAL at 11:49

## 2023-11-18 RX ADMIN — DILTIAZEM HYDROCHLORIDE 90 MG: 90 TABLET, FILM COATED ORAL at 17:56

## 2023-11-18 RX ADMIN — BUDESONIDE AND FORMOTEROL FUMARATE DIHYDRATE 2 PUFF: 160; 4.5 AEROSOL RESPIRATORY (INHALATION) at 06:59

## 2023-11-18 RX ADMIN — GABAPENTIN 600 MG: 300 CAPSULE ORAL at 21:01

## 2023-11-18 RX ADMIN — LORAZEPAM 2 MG: 1 TABLET ORAL at 21:01

## 2023-11-18 NOTE — PLAN OF CARE
Goal Outcome Evaluation:         Encourage use of call light before ambulating,bed in low position and safety rounds per protocol.

## 2023-11-18 NOTE — PROGRESS NOTES
"EP Problems:  1.  Longstanding persistent atrial fibrillation     Cardiology Problems:  1.  Hypertension  2.  Chronic diastolic heart failure  3.  Moderate pericardial effusion  4.  Likely pulmonary hypertension     Medical Problems:  1.  COPD on home oxygen  2.  Tobacco use disorder  3.  Alcohol use disorder  4.  Lumbar stenosis    Patient ID:  Burt Mcdonald is a 73 y.o. male with problem list as above as above who EP is following for longstanding persistent AF.      Subjective:  Rates remain elevated.  No significant change in symptoms. Diltiazem infusion discontinued due to hypotension overnight.    Objective:  BP 93/54 (BP Location: Right arm, Patient Position: Lying) Comment: RN notified  Pulse 109 Comment: RN notified  Temp 98 °F (36.7 °C) (Oral)   Resp 16   Ht 170.2 cm (67\")   Wt 84.8 kg (187 lb)   SpO2 95%   BMI 29.29 kg/m²     Well appearing. NAD.  CTAB, prolonged expiratory phase, wearing oxygen  BLE, stable from yesterday  Irregular rate, tachycardic.    Labs today:  Cr 0.73  Hgb 10.9    Inpatient telemetry since yesterday:  Atrial fibrillation, RVR    Assessment:  Longstanding persistent atrial fibrillation, acute exacerbation with RVR  Acute on chronic diastolic heart failure    Rates still relatively elevated, however mild improvement and now off diltiazem infusion.  Will continue on the 90mg of diltiazem and digoxin until tomorrow to see if rates don't improve at steady state.  Agree with continued IV furosemide. If rate control cannot be reasonably obtained, PPM and AVN ablation likely.    Plan:  - Continue diltiazem 90mg q6h  - Continue digoxin 125mcg  - Agree with continued IV furosemide  - Repeat labs for furosemide monitoring tomorrow    Part of this note may be an electronic transcription/translation of spoken language to printed text using the Dragon Dictation System.    "

## 2023-11-18 NOTE — PLAN OF CARE
Goal Outcome Evaluation:                Outcome Evaluation: HR -157, HR does avg 140's-150's and even once up to 170 with mimimal activity, with coup/pvc. md aware HR elevated,  no c/o chest pain or SOB, recieving cardizem and digoxin, Blood pressure tolerating cardiac meds, , continues on 2L/NC CIWA score 10-11, has noticeable tremors,  scheduled ativan given, pt slept off/on for most of the day, Continues on Lasix drip, voiding adequate amounts no c/o of pain, AAOx4, bed alarm in use for safety

## 2023-11-18 NOTE — NURSING NOTE
C/o pain in L fa. Iv site with edema noted. Doxycyline iv stopped. IV dc'd,cath intact. Warm, moist cloth applied.

## 2023-11-18 NOTE — H&P
History and Physical    Patient:  Burt Mcdonald  MRN: [id number]    CHIEF COMPLAINT:  sob, weakness and fall    History Obtained From: the patient   PCP: Arjun Roman MD    HISTORY OF PRESENT ILLNESS:   The patient is a 73 y.o. male who presents with a history of COPD and afib presents to the ER with Afib with RVR and progressive shortness of breath and weakness. He was found to have bilateral pneumonia and to be in afib with RVR. An echo done today shows a depressed EF of  40-45%. He has significant lower extremity edema and has had multiple falls recently and near syncope. This AM, feels a little better but still not near baseline.     REVIEW OF SYSTEMS:    Review of Systems   Constitutional:  Positive for activity change, appetite change, fatigue and fever.   HENT:  Positive for congestion.    Respiratory:  Positive for cough, shortness of breath and wheezing.    Cardiovascular:  Positive for palpitations and leg swelling.   Gastrointestinal: Negative.    Genitourinary: Negative.    Musculoskeletal: Negative.    Neurological: Negative.    Hematological: Negative.           Past Medical History:  Past Medical History:   Diagnosis Date    A-fib     COPD (chronic obstructive pulmonary disease)     Elevated PSA     Hypercholesteremia     Hypertension        Past Surgical History:  Past Surgical History:   Procedure Laterality Date    BACK SURGERY      EYE SURGERY  01/2016    Bilateral cataract       Medications Prior to Admission:    Medications Prior to Admission   Medication Sig Dispense Refill Last Dose    albuterol sulfate  (90 Base) MCG/ACT inhaler Inhale 2 puffs Every 4 (Four) Hours As Needed for Wheezing or Shortness of Air.   11/15/2023    amLODIPine-benazepril (LOTREL) 10-40 MG per capsule Take 1 capsule by mouth Daily.   11/15/2023    bumetanide (BUMEX) 1 MG tablet Take 1 tablet by mouth Daily.   11/15/2023    DULoxetine (CYMBALTA) 60 MG capsule Take 1 capsule by mouth Daily.    11/15/2023    fenofibrate (TRICOR) 145 MG tablet Take 1 tablet by mouth Daily.   11/15/2023    gabapentin (NEURONTIN) 600 MG tablet Take 1 tablet by mouth 3 (Three) Times a Day.   11/15/2023    HYDROcodone-acetaminophen (NORCO)  MG per tablet Take 1 tablet by mouth Every 8 (Eight) Hours As Needed.   11/15/2023    metoprolol tartrate (LOPRESSOR) 25 MG tablet Take 1 tablet by mouth 2 (Two) Times a Day.   11/15/2023    omeprazole (priLOSEC) 20 MG capsule Take 1 capsule by mouth Daily.   11/15/2023    rivaroxaban (XARELTO) 20 MG tablet Take 1 tablet by mouth Daily.   11/15/2023    rosuvastatin (CRESTOR) 10 MG tablet Take 1 tablet by mouth Daily.   11/15/2023    tamsulosin (FLOMAX) 0.4 MG capsule 24 hr capsule Take 1 capsule by mouth Daily.   11/15/2023    Trelegy Ellipta 200-62.5-25 MCG/ACT aerosol powder  Inhale 1 puff Daily.   11/15/2023    triazolam (HALCION) 0.25 MG tablet Take 1 tablet by mouth At Night As Needed.   11/15/2023    vitamin D (ERGOCALCIFEROL) 1.25 MG (95717 UT) capsule capsule Take 1 capsule by mouth 1 (One) Time Per Week.   11/15/2023       Allergies:  Patient has no known allergies.    Social History:   Social History     Socioeconomic History    Marital status: Single   Tobacco Use    Smoking status: Every Day     Packs/day: 1     Types: Cigarettes    Smokeless tobacco: Current     Types: Chew   Vaping Use    Vaping Use: Never used   Substance and Sexual Activity    Alcohol use: Yes     Alcohol/week: 3.0 standard drinks of alcohol     Types: 2 Cans of beer, 1 Shots of liquor per week     Comment: daily    Drug use: No    Sexual activity: Defer       Family History:   Family History   Problem Relation Age of Onset    Cancer Father         LUNG    Cancer Mother         LUNG    No Known Problems Sister     No Known Problems Brother     No Known Problems Brother     No Known Problems Brother            Physical Exam:    Vitals: BP 93/63 (BP Location: Left arm, Patient Position: Lying)   Pulse  "(!) 124   Temp 97.8 °F (36.6 °C) (Oral)   Resp 16   Ht 170.2 cm (67\")   Wt 84.8 kg (187 lb)   SpO2 96%   BMI 29.29 kg/m²   Physical Exam  Vitals and nursing note reviewed.   Constitutional:       Appearance: He is normal weight.   HENT:      Head: Normocephalic and atraumatic.      Nose: Nose normal.      Mouth/Throat:      Mouth: Mucous membranes are moist.   Eyes:      Pupils: Pupils are equal, round, and reactive to light.   Cardiovascular:      Rate and Rhythm: Tachycardia present. Rhythm irregular.      Pulses: Normal pulses.   Pulmonary:      Breath sounds: Wheezing and rales present.   Abdominal:      General: Abdomen is flat. Bowel sounds are normal.      Palpations: Abdomen is soft.   Musculoskeletal:      Cervical back: Normal range of motion.      Right lower leg: Edema present.      Left lower leg: Edema present.   Skin:     General: Skin is warm.      Capillary Refill: Capillary refill takes less than 2 seconds.   Neurological:      General: No focal deficit present.      Mental Status: He is alert. Mental status is at baseline.           Lab Results (last 24 hours)       Procedure Component Value Units Date/Time    Blood Culture - Blood, Arm, Right [[id number]]  (Normal) Collected: 11/16/23 1505    Specimen: Blood from Arm, Right Updated: 11/17/23 1532     Blood Culture No growth at 24 hours    Blood Culture - Blood, Arm, Right [[id number]]  (Normal) Collected: 11/16/23 1450    Specimen: Blood from Arm, Right Updated: 11/17/23 1532     Blood Culture No growth at 24 hours    Comprehensive Metabolic Panel [[id number]]  (Abnormal) Collected: 11/17/23 0447    Specimen: Blood Updated: 11/17/23 0557     Glucose 115 mg/dL      BUN 16 mg/dL      Creatinine 0.62 mg/dL      Sodium 138 mmol/L      Potassium 4.2 mmol/L      Chloride 98 mmol/L      CO2 35.0 mmol/L      Calcium 8.7 mg/dL      Total Protein 6.0 g/dL      Albumin 2.9 g/dL      ALT (SGPT) 17 U/L      AST (SGOT) 31 U/L      Alkaline Phosphatase 68 " U/L      Total Bilirubin 0.5 mg/dL      Globulin 3.1 gm/dL      A/G Ratio 0.9 g/dL      BUN/Creatinine Ratio 25.8     Anion Gap 5.0 mmol/L      eGFR 100.9 mL/min/1.73     Narrative:      GFR Normal >60  Chronic Kidney Disease <60  Kidney Failure <15    The GFR formula is only valid for adults with stable renal function between ages 18 and 70.    Magnesium [[id number]]  (Normal) Collected: 11/17/23 0447    Specimen: Blood Updated: 11/17/23 0557     Magnesium 1.8 mg/dL     CBC & Differential [[id number]]  (Abnormal) Collected: 11/17/23 0447    Specimen: Blood Updated: 11/17/23 0530    Narrative:      The following orders were created for panel order CBC & Differential.  Procedure                               Abnormality         Status                     ---------                               -----------         ------                     CBC Auto Differential[[id number]]        Abnormal            Final result                 Please view results for these tests on the individual orders.    CBC Auto Differential [[id number]]  (Abnormal) Collected: 11/17/23 0447    Specimen: Blood Updated: 11/17/23 0530     WBC 5.68 10*3/mm3      RBC 4.19 10*6/mm3      Hemoglobin 11.7 g/dL      Hematocrit 38.6 %      MCV 92.1 fL      MCH 27.9 pg      MCHC 30.3 g/dL      RDW 15.5 %      RDW-SD 52.1 fl      MPV 10.0 fL      Platelets 431 10*3/mm3      Neutrophil % 62.9 %      Lymphocyte % 18.8 %      Monocyte % 16.5 %      Eosinophil % 0.7 %      Basophil % 0.4 %      Immature Grans % 0.7 %      Neutrophils, Absolute 3.57 10*3/mm3      Lymphocytes, Absolute 1.07 10*3/mm3      Monocytes, Absolute 0.94 10*3/mm3      Eosinophils, Absolute 0.04 10*3/mm3      Basophils, Absolute 0.02 10*3/mm3      Immature Grans, Absolute 0.04 10*3/mm3      nRBC 0.0 /100 WBC     High Sensitivity Troponin T 2Hr [[id number]]  (Normal) Collected: 11/16/23 2033    Specimen: Blood Updated: 11/16/23 2106     HS Troponin T 16 ng/L      Troponin T Delta 0 ng/L      Narrative:      High Sensitive Troponin T Reference Range:  <14.0 ng/L- Negative Female for AMI  <22.0 ng/L- Negative Male for AMI  >=14 - Abnormal Female indicating possible myocardial injury.  >=22 - Abnormal Male indicating possible myocardial injury.   Clinicians would have to utilize clinical acumen, EKG, Troponin, and serial changes to determine if it is an Acute Myocardial Infarction or myocardial injury due to an underlying chronic condition.         High Sensitivity Troponin T [[id number]]  (Normal) Collected: 11/16/23 1842    Specimen: Blood Updated: 11/16/23 2004     HS Troponin T 16 ng/L     Narrative:      High Sensitive Troponin T Reference Range:  <14.0 ng/L- Negative Female for AMI  <22.0 ng/L- Negative Male for AMI  >=14 - Abnormal Female indicating possible myocardial injury.  >=22 - Abnormal Male indicating possible myocardial injury.   Clinicians would have to utilize clinical acumen, EKG, Troponin, and serial changes to determine if it is an Acute Myocardial Infarction or myocardial injury due to an underlying chronic condition.                  -----------------------------------------------------------------    Radiology:     Adult Transthoracic Echo Complete w/ Color, Spectral and Contrast if Necessary Per Protocol    Result Date: 11/16/2023    Left ventricular systolic function is mildly decreased. Left ventricular ejection fraction appears to be 41 - 45%.   Left ventricular wall thickness is consistent with mild concentric hypertrophy.   Moderately reduced right ventricular systolic function noted.   Biatrial enlargement is noted.   Mild mitral valve regurgitation is present.   Mild tricuspid valve regurgitation is present. Estimated right ventricular systolic pressure from tricuspid regurgitation is markedly elevated (>55 mmHg).   A small to moderate size circumferential pericardial effusion is noted with the larger amount of fluid seen posterior to the heart with no obvious  echocardiographic signs of tamponade.     CT Angiogram Chest    Result Date: 11/16/2023  EXAMINATION: CT ANGIOGRAM CHEST-   11/16/2023 1:32 PM CST  HISTORY: Pulmonary embolism (PE) suspected, unknown D-dimer  In order to have a CT radiation dose as low as reasonably achievable Automated Exposure Control was utilized for adjustment of the mA and/or KV according to patient size.  DLP in mGycm= 168  The CT angiography of the chest were performed after intravenous contrast enhancement.  Images are acquired in axial plane and subsequent reconstruction in coronal and sagittal planes.  There is no previous similar study for comparison.  There is normal opacification of the pulmonary arteries and branches bilaterally. There are no filling defects in the visualized/opacified pulmonary arterial bed.  RV/LV ratio is 40/40 which may suggest mild right heart strain. There is reflux of the contrast material into the inferior vena cava and hepatic vein which again suggesting mild right heart strain.  There is mild dilatation of the main pulmonary artery which measures 3.3 cm in diameter. Mild to moderate dilatation of both right and left pulmonary arteries are noted. This represent mild to moderate pulmonary arterial hypertension.  Atheromatous change of thoracic aorta is noted. No aneurysmal dilatation. No dissection.  Limited visualized coronary arteries are relatively attenuated and diminutive with mild atheromatous changes.  There are atheromatous plaques at the origin of the brachiocephalic, left common carotid and left subclavian arteries. No stenosis. The brachiocephalic and left common carotid artery has a common origin from aortic arch. There is moderate pericardial effusion measuring 2 cm along the left cardiac border.  There is no evidence of mediastinal or hilar mass or lymphadenopathy.  Limited visualized soft tissues of the neck are unremarkable.  The thyroid gland appear normal without nodules.  There is no axillary  lymphadenopathy.  The lungs are moderately well-expanded with atelectatic changes more pronounced in the lower lungs right more than the left. There is a small area of consolidation/nodularity in the right lower lobe which may represent an evolving inflammatory/infectious process. There is mild to moderate dilatation with wall thickening of the bilateral lower lobe by bronchi and bronchioles. This is more pronounced in the right lower lobe.  There is a small right basal pleural effusion.  The central airway is clear and patent.  There is bilaterally symmetrical moderate gynecomastia.  Limited included abdomen is unremarkable.  Images reviewed in bone window chronic degenerative changes of the thoracic spine with loss of thoracic curvature. No acute bony abnormality.      1. No evidence of pulmonary embolism. No aortic aneurysm or dissection. 2. The nodular infiltrate in the right lower lobe probably represent an acute inflammatory/infectious process. Further follow-up is recommended. 3. Mild to moderate bronchitis/bronchiolitis/small airway disease in the lower lobes right more than the left. 4. Moderate pericardial effusion. 5. A small right basal pleural effusion.             This report was signed and finalized on 11/16/2023 2:45 PM CST by Dr. Candace Mccarthy MD.      XR Chest 1 View    Result Date: 11/16/2023  EXAMINATION: XR CHEST 1 VW- 11/16/2023 11:08 AM CST  HISTORY: Shortness of breath.  COMPARISON: There are no correlative imaging studies for comparison.  REPORT: The left lung is clear, there our patchy airspace opacities at the right lung base probably related to atelectasis, less likely pneumonia. There is mild elevation of the right hemidiaphragm. No pneumothorax or pleural effusion is identified. Heart size is normal. The osseous structures and upper abdomen appear unremarkable.      Patchy right basilar infiltrate, atelectasis versus less likely pneumonia.  This report was signed and finalized on  11/16/2023 11:12 AM CST by Dr. Arjun Norris MD.        Assessment and Plan     Primary Problem:  Atrial fibrillation with rapid ventricular response  Bilateral pneumonia -CAP/POA  COPD acute exacerbation  Acute on Chronic Respirtory Failure  Systolic CHF    Hospital Problem list:    Atrial fibrillation with rapid ventricular response      PMH:  Past Medical History:   Diagnosis Date    A-fib     COPD (chronic obstructive pulmonary disease)     Elevated PSA     Hypercholesteremia     Hypertension        Treatment Plan:  PNEUMONIA:  Start/Continue IV antibiotics  Pulmonary toilet (nebulized treatments, Incentive Spirometry, P&PD)  Early Mobilization  Collect sputum culture and blood cultures x2  Supplement 02 as needed to maintain oxygen saturation >92%  DVT prophylaxis with Lovenox and SCDs  Tobacco cessation education provided    ATRIAL FIBRILLATION:  Rate control - utilize cardizem 10mg IV bolus and 5mg/hr as needed to maintain rate <100.  If this fails or BP intolerant, will Add IV amiodarone and/or digoxin  Anticoagulation - utilize Lovenox 1mg/kg subcutaneous every 12 hours if not therapeutic on INR or if not on Xarelto/Pradaxa/Eliquis,etc.  Telemetry monitoring  R/o cardiac ischemia  R/o thyroid disorder  Cardiology consultation   COPD:  Start/continue IV steroids and wean as tolerated  Supplemental 02 to maintain oxygen sats approximately 90%, avoiding suppression of respiratory drive in CO2 retainers  Aggressive pulmonary toilet with Duoneb   Early mobilization  Incentive spirometry  DVT prophylaxis  IV antibiotics for evidence of Acute on Chronic Bronchitis  Monitor for respiratory distress  Pulmonary consult as needed      Disposition: home    Arjun Roman MD 11/17/2023 18:21 CST

## 2023-11-18 NOTE — PROGRESS NOTES
Daily Progress Note  Burt Mcdonald  MRN: [id number] LOS: 2    Admit Date: 11/16/2023 11/18/2023 11:37 CST    Subjective:      Chief Complaint:  Chief Complaint   Patient presents with    Atrial Fibrillation    Cough    Shortness of Breath       Interval History:    Reviewed overnight events and nursing notes.   The patient is resting comfortably this morning after Ativan dosing.  He apparently began having symptoms of withdrawal from alcohol this morning we started the CIWA protocol.    Review of Systems   Constitutional:  Positive for activity change and fatigue.   HENT:  Positive for congestion.    Respiratory:  Positive for cough and shortness of breath.    Cardiovascular: Negative.    Gastrointestinal: Negative.    Genitourinary: Negative.    Neurological:  Positive for tremors and weakness.       DIET:  Diet: Cardiac Diets; Healthy Heart (2-3 Na+); Texture: Regular Texture (IDDSI 7); Fluid Consistency: Thin (IDDSI 0)    Medications:   dilTIAZem, 5-15 mg/hr, Last Rate: Stopped (11/17/23 5348)  furosemide, 10 mg/hr, Last Rate: 10 mg/hr (11/18/23 5712)      budesonide-formoterol, 2 puff, Inhalation, BID - RT  cefTRIAXone, 1,000 mg, Intravenous, Q24H  digoxin, 125 mcg, Oral, Daily  dilTIAZem, 90 mg, Oral, Q6H  doxycycline, 100 mg, Intravenous, Q12H  DULoxetine, 60 mg, Oral, Daily  fenofibrate, 145 mg, Oral, Daily  folic acid, 1 mg, Oral, Daily  gabapentin, 600 mg, Oral, Q8H  ipratropium-albuterol, 3 mL, Nebulization, 4x Daily - RT  lisinopril, 40 mg, Oral, Q24H  LORazepam, 2 mg, Oral, Q6H   Followed by  [START ON 11/19/2023] LORazepam, 1 mg, Oral, Q6H  pantoprazole, 40 mg, Oral, Q AM  rivaroxaban, 20 mg, Oral, Daily With Dinner  rosuvastatin, 10 mg, Oral, Daily  tamsulosin, 0.4 mg, Oral, Daily  thiamine (B-1) IV, 200 mg, Intravenous, Q8H   Followed by  [START ON 11/23/2023] thiamine, 100 mg, Oral, Daily        Data:     Code Status:   Code Status and Medical Interventions:   Ordered at: 11/16/23 4021      Level Of Support Discussed With:    Patient     Code Status (Patient has no pulse and is not breathing):    CPR (Attempt to Resuscitate)     Medical Interventions (Patient has pulse or is breathing):    Full Support       Family History   Problem Relation Age of Onset    Cancer Father         LUNG    Cancer Mother         LUNG    No Known Problems Sister     No Known Problems Brother     No Known Problems Brother     No Known Problems Brother      Social History     Socioeconomic History    Marital status: Single   Tobacco Use    Smoking status: Every Day     Packs/day: 1     Types: Cigarettes    Smokeless tobacco: Current     Types: Chew   Vaping Use    Vaping Use: Never used   Substance and Sexual Activity    Alcohol use: Yes     Alcohol/week: 3.0 standard drinks of alcohol     Types: 2 Cans of beer, 1 Shots of liquor per week     Comment: daily    Drug use: No    Sexual activity: Defer       Labs:    Lab Results (last 72 hours)       Procedure Component Value Units Date/Time    High Sensitivity Troponin T 2Hr [[id number]]  (Normal) Collected: 11/18/23 0608    Specimen: Blood Updated: 11/18/23 0639     HS Troponin T 20 ng/L      Troponin T Delta -1 ng/L     Narrative:      High Sensitive Troponin T Reference Range:  <14.0 ng/L- Negative Female for AMI  <22.0 ng/L- Negative Male for AMI  >=14 - Abnormal Female indicating possible myocardial injury.  >=22 - Abnormal Male indicating possible myocardial injury.   Clinicians would have to utilize clinical acumen, EKG, Troponin, and serial changes to determine if it is an Acute Myocardial Infarction or myocardial injury due to an underlying chronic condition.         Comprehensive Metabolic Panel [[id number]]  (Abnormal) Collected: 11/18/23 0435    Specimen: Blood Updated: 11/18/23 0534     Glucose 125 mg/dL      BUN 14 mg/dL      Creatinine 0.73 mg/dL      Sodium 141 mmol/L      Potassium 3.7 mmol/L      Chloride 98 mmol/L      CO2 37.0 mmol/L      Calcium 8.5 mg/dL       Total Protein 5.7 g/dL      Albumin 2.7 g/dL      ALT (SGPT) 13 U/L      AST (SGOT) 23 U/L      Alkaline Phosphatase 68 U/L      Total Bilirubin 0.4 mg/dL      Globulin 3.0 gm/dL      A/G Ratio 0.9 g/dL      BUN/Creatinine Ratio 19.2     Anion Gap 6.0 mmol/L      eGFR 96.1 mL/min/1.73     Narrative:      GFR Normal >60  Chronic Kidney Disease <60  Kidney Failure <15    The GFR formula is only valid for adults with stable renal function between ages 18 and 70.    High Sensitivity Troponin T [[id number]]  (Normal) Collected: 11/18/23 0435    Specimen: Blood Updated: 11/18/23 0534     HS Troponin T 21 ng/L     Narrative:      High Sensitive Troponin T Reference Range:  <14.0 ng/L- Negative Female for AMI  <22.0 ng/L- Negative Male for AMI  >=14 - Abnormal Female indicating possible myocardial injury.  >=22 - Abnormal Male indicating possible myocardial injury.   Clinicians would have to utilize clinical acumen, EKG, Troponin, and serial changes to determine if it is an Acute Myocardial Infarction or myocardial injury due to an underlying chronic condition.         CBC & Differential [[id number]]  (Abnormal) Collected: 11/18/23 0435    Specimen: Blood Updated: 11/18/23 0507    Narrative:      The following orders were created for panel order CBC & Differential.  Procedure                               Abnormality         Status                     ---------                               -----------         ------                     CBC Auto Differential[[id number]]        Abnormal            Final result                 Please view results for these tests on the individual orders.    CBC Auto Differential [[id number]]  (Abnormal) Collected: 11/18/23 0435    Specimen: Blood Updated: 11/18/23 0507     WBC 5.70 10*3/mm3      RBC 3.92 10*6/mm3      Hemoglobin 10.9 g/dL      Hematocrit 36.3 %      MCV 92.6 fL      MCH 27.8 pg      MCHC 30.0 g/dL      RDW 15.4 %      RDW-SD 52.6 fl      MPV 9.4 fL      Platelets 440 10*3/mm3       Neutrophil % 54.0 %      Lymphocyte % 26.8 %      Monocyte % 17.4 %      Eosinophil % 0.7 %      Basophil % 0.4 %      Immature Grans % 0.7 %      Neutrophils, Absolute 3.08 10*3/mm3      Lymphocytes, Absolute 1.53 10*3/mm3      Monocytes, Absolute 0.99 10*3/mm3      Eosinophils, Absolute 0.04 10*3/mm3      Basophils, Absolute 0.02 10*3/mm3      Immature Grans, Absolute 0.04 10*3/mm3      nRBC 0.0 /100 WBC     BNP [[id number]]  (Abnormal) Collected: 11/17/23 0447    Specimen: Blood Updated: 11/17/23 1912     proBNP 2,561.0 pg/mL     Narrative:      This assay is used as an aid in the diagnosis of individuals suspected of having heart failure. It can be used as an aid in the diagnosis of acute decompensated heart failure (ADHF) in patients presenting with signs and symptoms of ADHF to the emergency department (ED). In addition, NT-proBNP of <300 pg/mL indicates ADHF is not likely.    Age Range Result Interpretation  NT-proBNP Concentration (pg/mL:      <50             Positive            >450                   Gray                 300-450                    Negative             <300    50-75           Positive            >900                  Gray                300-900                  Negative            <300      >75             Positive            >1800                  Gray                300-1800                  Negative            <300    Blood Culture - Blood, Arm, Right [[id number]]  (Normal) Collected: 11/16/23 1505    Specimen: Blood from Arm, Right Updated: 11/17/23 1532     Blood Culture No growth at 24 hours    Blood Culture - Blood, Arm, Right [[id number]]  (Normal) Collected: 11/16/23 1450    Specimen: Blood from Arm, Right Updated: 11/17/23 1532     Blood Culture No growth at 24 hours    Comprehensive Metabolic Panel [[id number]]  (Abnormal) Collected: 11/17/23 0447    Specimen: Blood Updated: 11/17/23 0557     Glucose 115 mg/dL      BUN 16 mg/dL      Creatinine 0.62 mg/dL      Sodium 138 mmol/L       Potassium 4.2 mmol/L      Chloride 98 mmol/L      CO2 35.0 mmol/L      Calcium 8.7 mg/dL      Total Protein 6.0 g/dL      Albumin 2.9 g/dL      ALT (SGPT) 17 U/L      AST (SGOT) 31 U/L      Alkaline Phosphatase 68 U/L      Total Bilirubin 0.5 mg/dL      Globulin 3.1 gm/dL      A/G Ratio 0.9 g/dL      BUN/Creatinine Ratio 25.8     Anion Gap 5.0 mmol/L      eGFR 100.9 mL/min/1.73     Narrative:      GFR Normal >60  Chronic Kidney Disease <60  Kidney Failure <15    The GFR formula is only valid for adults with stable renal function between ages 18 and 70.    Magnesium [[id number]]  (Normal) Collected: 11/17/23 0447    Specimen: Blood Updated: 11/17/23 0557     Magnesium 1.8 mg/dL     CBC & Differential [[id number]]  (Abnormal) Collected: 11/17/23 0447    Specimen: Blood Updated: 11/17/23 0530    Narrative:      The following orders were created for panel order CBC & Differential.  Procedure                               Abnormality         Status                     ---------                               -----------         ------                     CBC Auto Differential[[id number]]        Abnormal            Final result                 Please view results for these tests on the individual orders.    CBC Auto Differential [[id number]]  (Abnormal) Collected: 11/17/23 0447    Specimen: Blood Updated: 11/17/23 0530     WBC 5.68 10*3/mm3      RBC 4.19 10*6/mm3      Hemoglobin 11.7 g/dL      Hematocrit 38.6 %      MCV 92.1 fL      MCH 27.9 pg      MCHC 30.3 g/dL      RDW 15.5 %      RDW-SD 52.1 fl      MPV 10.0 fL      Platelets 431 10*3/mm3      Neutrophil % 62.9 %      Lymphocyte % 18.8 %      Monocyte % 16.5 %      Eosinophil % 0.7 %      Basophil % 0.4 %      Immature Grans % 0.7 %      Neutrophils, Absolute 3.57 10*3/mm3      Lymphocytes, Absolute 1.07 10*3/mm3      Monocytes, Absolute 0.94 10*3/mm3      Eosinophils, Absolute 0.04 10*3/mm3      Basophils, Absolute 0.02 10*3/mm3      Immature Grans, Absolute 0.04  10*3/mm3      nRBC 0.0 /100 WBC     High Sensitivity Troponin T 2Hr [[id number]]  (Normal) Collected: 11/16/23 2033    Specimen: Blood Updated: 11/16/23 2106     HS Troponin T 16 ng/L      Troponin T Delta 0 ng/L     Narrative:      High Sensitive Troponin T Reference Range:  <14.0 ng/L- Negative Female for AMI  <22.0 ng/L- Negative Male for AMI  >=14 - Abnormal Female indicating possible myocardial injury.  >=22 - Abnormal Male indicating possible myocardial injury.   Clinicians would have to utilize clinical acumen, EKG, Troponin, and serial changes to determine if it is an Acute Myocardial Infarction or myocardial injury due to an underlying chronic condition.         High Sensitivity Troponin T [[id number]]  (Normal) Collected: 11/16/23 1842    Specimen: Blood Updated: 11/16/23 2004     HS Troponin T 16 ng/L     Narrative:      High Sensitive Troponin T Reference Range:  <14.0 ng/L- Negative Female for AMI  <22.0 ng/L- Negative Male for AMI  >=14 - Abnormal Female indicating possible myocardial injury.  >=22 - Abnormal Male indicating possible myocardial injury.   Clinicians would have to utilize clinical acumen, EKG, Troponin, and serial changes to determine if it is an Acute Myocardial Infarction or myocardial injury due to an underlying chronic condition.         Lactic Acid, Plasma [[id number]]  (Normal) Collected: 11/16/23 1450    Specimen: Blood from Arm, Right Updated: 11/16/23 1533     Lactate 1.3 mmol/L     COVID PRE-OP / PRE-PROCEDURE SCREENING ORDER (NO ISOLATION) - Swab, Nasal Cavity [[id number]]  (Normal) Collected: 11/16/23 1148    Specimen: Swab from Nasal Cavity Updated: 11/16/23 1238    Narrative:      The following orders were created for panel order COVID PRE-OP / PRE-PROCEDURE SCREENING ORDER (NO ISOLATION) - Swab, Nasal Cavity.  Procedure                               Abnormality         Status                     ---------                               -----------         ------                  "    COVID-19 and FLU A/B PCR...[[id number]]  Normal              Final result                 Please view results for these tests on the individual orders.    COVID-19 and FLU A/B PCR, 1 HR TAT - Swab, Nasopharynx [[id number]]  (Normal) Collected: 11/16/23 1148    Specimen: Swab from Nasopharynx Updated: 11/16/23 1238     COVID19 Not Detected     Influenza A PCR Not Detected     Influenza B PCR Not Detected    Narrative:      Fact sheet for providers: https://www.fda.gov/media/651022/download    Fact sheet for patients: https://www.fda.gov/media/224697/download    Test performed by PCR.    TSH [[id number]]  (Normal) Collected: 11/16/23 1107    Specimen: Blood Updated: 11/16/23 1157     TSH 1.360 uIU/mL     Procalcitonin [[id number]]  (Normal) Collected: 11/16/23 1107    Specimen: Blood Updated: 11/16/23 1156     Procalcitonin 0.10 ng/mL     Narrative:      As a Marker for Sepsis (Non-Neonates):    1. <0.5 ng/mL represents a low risk of severe sepsis and/or septic shock.  2. >2 ng/mL represents a high risk of severe sepsis and/or septic shock.    As a Marker for Lower Respiratory Tract Infections that require antibiotic therapy:    PCT on Admission    Antibiotic Therapy       6-12 Hrs later    >0.5                Strongly Recommended  >0.25 - <0.5        Recommended   0.1 - 0.25          Discouraged              Remeasure/reassess PCT  <0.1                Strongly Discouraged     Remeasure/reassess PCT    As 28 day mortality risk marker: \"Change in Procalcitonin Result\" (>80% or <=80%) if Day 0 (or Day 1) and Day 4 values are available. Refer to http://www.[employer]-pct-calculator.com    Change in PCT <=80%  A decrease of PCT levels below or equal to 80% defines a positive change in PCT test result representing a higher risk for 28-day all-cause mortality of patients diagnosed with severe sepsis for septic shock.    Change in PCT >80%  A decrease of PCT levels of more than 80% defines a negative change in PCT result " representing a lower risk for 28-day all-cause mortality of patients diagnosed with severe sepsis or septic shock.       T4, Free [[id number]]  (Normal) Collected: 11/16/23 1107    Specimen: Blood Updated: 11/16/23 1156     Free T4 1.08 ng/dL     Narrative:      Results may be falsely increased if patient taking Biotin.      Basic Metabolic Panel [[id number]]  (Abnormal) Collected: 11/16/23 1107    Specimen: Blood Updated: 11/16/23 1154     Glucose 124 mg/dL      BUN 26 mg/dL      Creatinine 0.58 mg/dL      Sodium 136 mmol/L      Potassium 4.7 mmol/L      Chloride 97 mmol/L      CO2 32.0 mmol/L      Calcium 9.1 mg/dL      BUN/Creatinine Ratio 44.8     Anion Gap 7.0 mmol/L      eGFR 103.0 mL/min/1.73     Narrative:      GFR Normal >60  Chronic Kidney Disease <60  Kidney Failure <15    The GFR formula is only valid for adults with stable renal function between ages 18 and 70.    C-reactive Protein [[id number]]  (Abnormal) Collected: 11/16/23 1107    Specimen: Blood Updated: 11/16/23 1154     C-Reactive Protein 6.32 mg/dL     Magnesium [[id number]]  (Normal) Collected: 11/16/23 1107    Specimen: Blood Updated: 11/16/23 1151     Magnesium 1.9 mg/dL     BNP [[id number]]  (Abnormal) Collected: 11/16/23 1107    Specimen: Blood Updated: 11/16/23 1150     proBNP 4,048.0 pg/mL     Narrative:      This assay is used as an aid in the diagnosis of individuals suspected of having heart failure. It can be used as an aid in the diagnosis of acute decompensated heart failure (ADHF) in patients presenting with signs and symptoms of ADHF to the emergency department (ED). In addition, NT-proBNP of <300 pg/mL indicates ADHF is not likely.    Age Range Result Interpretation  NT-proBNP Concentration (pg/mL:      <50             Positive            >450                   Gray                 300-450                    Negative             <300    50-75           Positive            >900                  Gray                300-900                "   Negative            <300      >75             Positive            >1800                  Gray                300-1800                  Negative            <300    Single High Sensitivity Troponin T [[id number]]  (Normal) Collected: 11/16/23 1107    Specimen: Blood Updated: 11/16/23 1150     HS Troponin T 17 ng/L     Narrative:      High Sensitive Troponin T Reference Range:  <14.0 ng/L- Negative Female for AMI  <22.0 ng/L- Negative Male for AMI  >=14 - Abnormal Female indicating possible myocardial injury.  >=22 - Abnormal Male indicating possible myocardial injury.   Clinicians would have to utilize clinical acumen, EKG, Troponin, and serial changes to determine if it is an Acute Myocardial Infarction or myocardial injury due to an underlying chronic condition.         Protime-INR [[id number]]  (Abnormal) Collected: 11/16/23 1107    Specimen: Blood Updated: 11/16/23 1139     Protime 17.7 Seconds      INR 1.44    D-dimer, Quantitative [[id number]]  (Abnormal) Collected: 11/16/23 1107    Specimen: Blood Updated: 11/16/23 1139     D-Dimer, Quantitative 1.02 MCGFEU/mL     Narrative:      According to the assay 's published package insert, a normal (<0.50 MCGFEU/mL) D-dimer result in conjunction with a non-high clinical probability assessment, excludes deep vein thrombosis (DVT) and pulmonary embolism (PE) with high sensitivity.    D-dimer values increase with age and this can make VTE exclusion of an older population difficult. To address this, the [employer], based on best available evidence and recent guidelines, recommends that clinicians use age-adjusted D-dimer thresholds in patients greater than 50 years of age with: a) a low probability of PE who do not meet all Pulmonary Embolism Rule Out Criteria, or b) in those with intermediate probability of PE.   The formula for an age-adjusted D-dimer cut-off is \"age/100\".  For example, a 60 year old patient would have an age-adjusted " "cut-off of 0.60 MCGFEU/mL and an 80 year old 0.80 MCGFEU/mL.    CBC & Differential [[id number]]  (Abnormal) Collected: 23    Specimen: Blood Updated: 23    Narrative:      The following orders were created for panel order CBC & Differential.  Procedure                               Abnormality         Status                     ---------                               -----------         ------                     CBC Auto Differential[[id number]]        Abnormal            Final result                 Please view results for these tests on the individual orders.    CBC Auto Differential [[id number]]  (Abnormal) Collected: 23    Specimen: Blood Updated: 23     WBC 7.30 10*3/mm3      RBC 4.61 10*6/mm3      Hemoglobin 12.8 g/dL      Hematocrit 43.2 %      MCV 93.7 fL      MCH 27.8 pg      MCHC 29.6 g/dL      RDW 15.5 %      RDW-SD 53.3 fl      MPV 9.6 fL      Platelets 453 10*3/mm3      Neutrophil % 75.5 %      Lymphocyte % 13.4 %      Monocyte % 9.7 %      Eosinophil % 0.3 %      Basophil % 0.4 %      Immature Grans % 0.7 %      Neutrophils, Absolute 5.51 10*3/mm3      Lymphocytes, Absolute 0.98 10*3/mm3      Monocytes, Absolute 0.71 10*3/mm3      Eosinophils, Absolute 0.02 10*3/mm3      Basophils, Absolute 0.03 10*3/mm3      Immature Grans, Absolute 0.05 10*3/mm3      nRBC 0.0 /100 WBC               Objective:     Vitals: /58 (BP Location: Right arm, Patient Position: Lying)   Pulse (!) 134   Temp 98.2 °F (36.8 °C) (Oral)   Resp 18   Ht 170.2 cm (67\")   Wt 84.8 kg (187 lb)   SpO2 91%   BMI 29.29 kg/m²    Intake/Output Summary (Last 24 hours) at 2023 1137  Last data filed at 2023 0334  Gross per 24 hour   Intake 700 ml   Output 350 ml   Net 350 ml    Temp (24hrs), Av °F (36.7 °C), Min:97.2 °F (36.2 °C), Max:98.4 °F (36.9 °C)      Physical Exam  Vitals and nursing note reviewed.   Constitutional:       Appearance: Normal appearance.   HENT:      " Head: Normocephalic and atraumatic.      Mouth/Throat:      Mouth: Mucous membranes are moist.   Eyes:      Pupils: Pupils are equal, round, and reactive to light.   Cardiovascular:      Rate and Rhythm: Normal rate and regular rhythm.      Pulses: Normal pulses.      Heart sounds: Normal heart sounds.   Pulmonary:      Effort: Pulmonary effort is normal.   Abdominal:      General: Abdomen is flat. Bowel sounds are normal.   Musculoskeletal:         General: Normal range of motion.   Skin:     General: Skin is warm.      Capillary Refill: Capillary refill takes less than 2 seconds.   Neurological:      General: No focal deficit present.      Mental Status: He is alert.             Assessment and Plan:     Primary Problem:  Atrial fibrillation with rapid ventricular response  Bilateral pneumonia -CAP/POA  COPD acute exacerbation  Acute on Chronic Respirtory Failure  Systolic CHF  Acute alcohol withdrawal  Hospital Problem list:    Atrial fibrillation with rapid ventricular response      PMH:  Past Medical History:   Diagnosis Date    A-fib     COPD (chronic obstructive pulmonary disease)     Elevated PSA     Hypercholesteremia     Hypertension        Treatment Plan:  PNEUMONIA:  Start/Continue IV antibiotics  Pulmonary toilet (nebulized treatments, Incentive Spirometry, P&PD)  Early Mobilization  Collect sputum culture and blood cultures x2  Supplement 02 as needed to maintain oxygen saturation >92%  DVT prophylaxis with Lovenox and SCDs  Tobacco cessation education provided     ATRIAL FIBRILLATION:  Rate control - utilize cardizem 10mg IV bolus and 5mg/hr as needed to maintain rate <100.  If this fails or BP intolerant, will Add IV amiodarone and/or digoxin  Anticoagulation - utilize Lovenox 1mg/kg subcutaneous every 12 hours if not therapeutic on INR or if not on Xarelto/Pradaxa/Eliquis,etc.  Telemetry monitoring  R/o cardiac ischemia  R/o thyroid disorder  Cardiology consultation ongoing  COPD:  Start/continue IV  steroids and wean as tolerated  Supplemental 02 to maintain oxygen sats approximately 90%, avoiding suppression of respiratory drive in CO2 retainers  Aggressive pulmonary toilet with Duoneb   Early mobilization  Incentive spirometry  DVT prophylaxis  IV antibiotics for evidence of Acute on Chronic Bronchitis  Monitor for respiratory distress  Pulmonary consult as needed       Disposition: home    Reviewed treatment plans with the patient and/or family.   30 minutes spent in face to face interaction and coordination of care.     Electronically signed by Arjun Roman MD on 11/18/2023 at 11:37 CST

## 2023-11-18 NOTE — PROGRESS NOTES
"EP Problems:  1.  Longstanding persistent atrial fibrillation     Cardiology Problems:  1.  Hypertension  2.  Chronic diastolic heart failure  3.  Moderate pericardial effusion  4.  Likely pulmonary hypertension     Medical Problems:  1.  COPD on home oxygen  2.  Tobacco use disorder  3.  Alcohol use disorder  4.  Lumbar stenosis    Patient ID:  Burt Mcdonald is a 73 y.o. male with problem list as above as above who EP is following for longstanding persistent AF.      Subjective:  No significant events.  Feels mildly better.  Feels like fluid in legs is improving.    Objective:  BP 92/56 (BP Location: Left arm, Patient Position: Lying) Comment: RN notified  Pulse 114   Temp 98.4 °F (36.9 °C) (Oral)   Resp 18   Ht 170.2 cm (67\")   Wt 84.8 kg (187 lb)   SpO2 96%   BMI 29.29 kg/m²     Labs today:  Cr 0.6  Mag 1.8  Hgb 11.7    Assessment:  Longstanding persistent atrial fibrillation, acute exacerbation with RVR  Acute on chronic diastolic heart failure    Rates remain uncontrolled.  Will increase diltiazem PO dose to further wean ggt.  Adding digoxin.    Plan:  - Increase diltiazem to 90mg q6h  - Add digoxin 125mcg  -Plan to repeat diuretics tomorrow with IV furosemide  - Give mag sulf 2g once    Part of this note may be an electronic transcription/translation of spoken language to printed text using the Dragon Dictation System.    "

## 2023-11-19 PROBLEM — I31.39 PERICARDIAL EFFUSION: Status: ACTIVE | Noted: 2023-11-19

## 2023-11-19 PROBLEM — I50.41 ACUTE COMBINED SYSTOLIC AND DIASTOLIC CONGESTIVE HEART FAILURE: Status: ACTIVE | Noted: 2023-11-19

## 2023-11-19 LAB
ANION GAP SERPL CALCULATED.3IONS-SCNC: 4 MMOL/L (ref 5–15)
BASOPHILS # BLD AUTO: 0.03 10*3/MM3 (ref 0–0.2)
BASOPHILS NFR BLD AUTO: 0.4 % (ref 0–1.5)
BUN SERPL-MCNC: 16 MG/DL (ref 8–23)
BUN/CREAT SERPL: 18.4 (ref 7–25)
CALCIUM SPEC-SCNC: 8.3 MG/DL (ref 8.6–10.5)
CHLORIDE SERPL-SCNC: 96 MMOL/L (ref 98–107)
CO2 SERPL-SCNC: 39 MMOL/L (ref 22–29)
CREAT SERPL-MCNC: 0.87 MG/DL (ref 0.76–1.27)
DEPRECATED RDW RBC AUTO: 52.1 FL (ref 37–54)
EGFRCR SERPLBLD CKD-EPI 2021: 91.1 ML/MIN/1.73
EOSINOPHIL # BLD AUTO: 0.03 10*3/MM3 (ref 0–0.4)
EOSINOPHIL NFR BLD AUTO: 0.4 % (ref 0.3–6.2)
ERYTHROCYTE [DISTWIDTH] IN BLOOD BY AUTOMATED COUNT: 15.5 % (ref 12.3–15.4)
GLUCOSE SERPL-MCNC: 112 MG/DL (ref 65–99)
HCT VFR BLD AUTO: 35.7 % (ref 37.5–51)
HGB BLD-MCNC: 10.7 G/DL (ref 13–17.7)
IMM GRANULOCYTES # BLD AUTO: 0.05 10*3/MM3 (ref 0–0.05)
IMM GRANULOCYTES NFR BLD AUTO: 0.7 % (ref 0–0.5)
LYMPHOCYTES # BLD AUTO: 1.6 10*3/MM3 (ref 0.7–3.1)
LYMPHOCYTES NFR BLD AUTO: 22.4 % (ref 19.6–45.3)
MCH RBC QN AUTO: 27.6 PG (ref 26.6–33)
MCHC RBC AUTO-ENTMCNC: 30 G/DL (ref 31.5–35.7)
MCV RBC AUTO: 92.2 FL (ref 79–97)
MONOCYTES # BLD AUTO: 1.06 10*3/MM3 (ref 0.1–0.9)
MONOCYTES NFR BLD AUTO: 14.9 % (ref 5–12)
NEUTROPHILS NFR BLD AUTO: 4.36 10*3/MM3 (ref 1.7–7)
NEUTROPHILS NFR BLD AUTO: 61.2 % (ref 42.7–76)
NRBC BLD AUTO-RTO: 0 /100 WBC (ref 0–0.2)
PLATELET # BLD AUTO: 452 10*3/MM3 (ref 140–450)
PMV BLD AUTO: 9.5 FL (ref 6–12)
POTASSIUM SERPL-SCNC: 4 MMOL/L (ref 3.5–5.2)
RBC # BLD AUTO: 3.87 10*6/MM3 (ref 4.14–5.8)
SODIUM SERPL-SCNC: 139 MMOL/L (ref 136–145)
WBC NRBC COR # BLD AUTO: 7.13 10*3/MM3 (ref 3.4–10.8)

## 2023-11-19 PROCEDURE — 99232 SBSQ HOSP IP/OBS MODERATE 35: CPT | Performed by: NURSE PRACTITIONER

## 2023-11-19 PROCEDURE — 80048 BASIC METABOLIC PNL TOTAL CA: CPT | Performed by: FAMILY MEDICINE

## 2023-11-19 PROCEDURE — 94761 N-INVAS EAR/PLS OXIMETRY MLT: CPT

## 2023-11-19 PROCEDURE — 25010000002 THIAMINE PER 100 MG: Performed by: FAMILY MEDICINE

## 2023-11-19 PROCEDURE — 94799 UNLISTED PULMONARY SVC/PX: CPT

## 2023-11-19 PROCEDURE — 85025 COMPLETE CBC W/AUTO DIFF WBC: CPT | Performed by: FAMILY MEDICINE

## 2023-11-19 PROCEDURE — 25010000002 FUROSEMIDE PER 20 MG: Performed by: NURSE PRACTITIONER

## 2023-11-19 PROCEDURE — 25010000002 CEFTRIAXONE PER 250 MG: Performed by: FAMILY MEDICINE

## 2023-11-19 RX ORDER — LISINOPRIL 5 MG/1
5 TABLET ORAL
Status: DISCONTINUED | OUTPATIENT
Start: 2023-11-19 | End: 2023-11-21 | Stop reason: HOSPADM

## 2023-11-19 RX ORDER — LORAZEPAM 1 MG/1
1 TABLET ORAL
Status: DISCONTINUED | OUTPATIENT
Start: 2023-11-19 | End: 2023-11-21 | Stop reason: HOSPADM

## 2023-11-19 RX ORDER — LORAZEPAM 1 MG/1
2 TABLET ORAL
Status: DISCONTINUED | OUTPATIENT
Start: 2023-11-19 | End: 2023-11-21 | Stop reason: HOSPADM

## 2023-11-19 RX ADMIN — DOXYCYCLINE 100 MG: 100 INJECTION, POWDER, LYOPHILIZED, FOR SOLUTION INTRAVENOUS at 18:58

## 2023-11-19 RX ADMIN — IPRATROPIUM BROMIDE AND ALBUTEROL SULFATE 3 ML: 2.5; .5 SOLUTION RESPIRATORY (INHALATION) at 10:31

## 2023-11-19 RX ADMIN — THIAMINE HYDROCHLORIDE 200 MG: 100 INJECTION, SOLUTION INTRAMUSCULAR; INTRAVENOUS at 05:13

## 2023-11-19 RX ADMIN — BUDESONIDE AND FORMOTEROL FUMARATE DIHYDRATE 2 PUFF: 160; 4.5 AEROSOL RESPIRATORY (INHALATION) at 06:40

## 2023-11-19 RX ADMIN — FUROSEMIDE 10 MG/HR: 10 INJECTION, SOLUTION INTRAVENOUS at 09:49

## 2023-11-19 RX ADMIN — GABAPENTIN 600 MG: 300 CAPSULE ORAL at 05:13

## 2023-11-19 RX ADMIN — IPRATROPIUM BROMIDE AND ALBUTEROL SULFATE 3 ML: 2.5; .5 SOLUTION RESPIRATORY (INHALATION) at 19:41

## 2023-11-19 RX ADMIN — GABAPENTIN 600 MG: 300 CAPSULE ORAL at 20:57

## 2023-11-19 RX ADMIN — IPRATROPIUM BROMIDE AND ALBUTEROL SULFATE 3 ML: 2.5; .5 SOLUTION RESPIRATORY (INHALATION) at 06:40

## 2023-11-19 RX ADMIN — BUDESONIDE AND FORMOTEROL FUMARATE DIHYDRATE 2 PUFF: 160; 4.5 AEROSOL RESPIRATORY (INHALATION) at 19:48

## 2023-11-19 RX ADMIN — THIAMINE HYDROCHLORIDE 200 MG: 100 INJECTION, SOLUTION INTRAMUSCULAR; INTRAVENOUS at 20:57

## 2023-11-19 RX ADMIN — GABAPENTIN 600 MG: 300 CAPSULE ORAL at 13:08

## 2023-11-19 RX ADMIN — DILTIAZEM HYDROCHLORIDE 90 MG: 90 TABLET, FILM COATED ORAL at 05:13

## 2023-11-19 RX ADMIN — DOXYCYCLINE 100 MG: 100 INJECTION, POWDER, LYOPHILIZED, FOR SOLUTION INTRAVENOUS at 06:00

## 2023-11-19 RX ADMIN — DILTIAZEM HYDROCHLORIDE 90 MG: 90 TABLET, FILM COATED ORAL at 23:00

## 2023-11-19 RX ADMIN — LORAZEPAM 2 MG: 1 TABLET ORAL at 23:00

## 2023-11-19 RX ADMIN — PANTOPRAZOLE SODIUM 40 MG: 40 TABLET, DELAYED RELEASE ORAL at 05:13

## 2023-11-19 RX ADMIN — RIVAROXABAN 20 MG: 20 TABLET, FILM COATED ORAL at 17:10

## 2023-11-19 RX ADMIN — THIAMINE HYDROCHLORIDE 200 MG: 100 INJECTION, SOLUTION INTRAMUSCULAR; INTRAVENOUS at 13:11

## 2023-11-19 RX ADMIN — FOLIC ACID 1 MG: 1 TABLET ORAL at 09:48

## 2023-11-19 RX ADMIN — LORAZEPAM 1 MG: 1 TABLET ORAL at 09:54

## 2023-11-19 RX ADMIN — FENOFIBRATE 145 MG: 145 TABLET ORAL at 09:54

## 2023-11-19 RX ADMIN — DULOXETINE HYDROCHLORIDE 60 MG: 30 CAPSULE, DELAYED RELEASE ORAL at 09:48

## 2023-11-19 RX ADMIN — LISINOPRIL 5 MG: 5 TABLET ORAL at 10:52

## 2023-11-19 RX ADMIN — LORAZEPAM 2 MG: 1 TABLET ORAL at 01:57

## 2023-11-19 RX ADMIN — DILTIAZEM HYDROCHLORIDE 90 MG: 90 TABLET, FILM COATED ORAL at 17:11

## 2023-11-19 RX ADMIN — DIGOXIN 125 MCG: 125 TABLET ORAL at 13:06

## 2023-11-19 RX ADMIN — CEFTRIAXONE 1000 MG: 1 INJECTION, POWDER, FOR SOLUTION INTRAMUSCULAR; INTRAVENOUS at 15:30

## 2023-11-19 RX ADMIN — LORAZEPAM 1 MG: 1 TABLET ORAL at 20:56

## 2023-11-19 RX ADMIN — LORAZEPAM 1 MG: 1 TABLET ORAL at 15:30

## 2023-11-19 RX ADMIN — TAMSULOSIN HYDROCHLORIDE 0.4 MG: 0.4 CAPSULE ORAL at 09:48

## 2023-11-19 RX ADMIN — DILTIAZEM HYDROCHLORIDE 90 MG: 90 TABLET, FILM COATED ORAL at 13:06

## 2023-11-19 RX ADMIN — IPRATROPIUM BROMIDE AND ALBUTEROL SULFATE 3 ML: 2.5; .5 SOLUTION RESPIRATORY (INHALATION) at 14:28

## 2023-11-19 RX ADMIN — ROSUVASTATIN CALCIUM 10 MG: 10 TABLET, COATED ORAL at 09:48

## 2023-11-19 NOTE — PLAN OF CARE
Goal Outcome Evaluation:            Safety rounds,encourage and educate using call light before getting out of bed, urinal within reach,bed alarm on.

## 2023-11-19 NOTE — PROGRESS NOTES
[facility] -  Progress Note     LOS: 3 days   Patient Care Team:  Arjun Roman MD as PCP - General (Family Medicine)  Fabiano Saldaña MD as Consulting Physician (Urology)  Nick Snider MD as Cardiologist (Cardiology)  Arjun Roman MD as Referring Physician (Family Medicine)    Chief Complaint:Shortness of Breath     Subjective     Interval History:   Patient notes he feels some better. Still short of breath. Still leg swelling. He reports good urine response. Heart rates some  better he notes he feels some better. Some sharp pain in his chest last night but resolved this am.     Review of Systems:     Review of Systems   Constitutional:  Positive for fatigue.   Respiratory:  Positive for shortness of breath.    Cardiovascular:  Positive for chest pain, palpitations and leg swelling.     Objective     Vital Sign Min/Max for last 24 hours  Temp  Min: 98.1 °F (36.7 °C)  Max: 99.2 °F (37.3 °C)   BP  Min: 92/45  Max: 107/60   Pulse  Min: 104  Max: 135   Resp  Min: 16  Max: 18   SpO2  Min: 91 %  Max: 96 %   No data recorded   No data recorded         11/16/23  1035   Weight: 84.8 kg (187 lb)       Telemetry: A-fib 100-120      Intake/Output Summary (Last 24 hours) at 11/19/2023 0948  Last data filed at 11/19/2023 0947  Gross per 24 hour   Intake 300 ml   Output 1700 ml   Net -1400 ml        Physical Exam:    Constitutional:       Appearance: Frail. Chronically ill-appearing and acutely ill-appearing.      Interventions: Nasal cannula in place.   HENT:    Mouth/Throat:      Pharynx: Oropharynx is clear.   Pulmonary:      Effort: Increased respiratory effort.      Breath sounds: Rhonchi present.   Cardiovascular:      Tachycardia present. Irregular rhythm.      Murmurs: There is no murmur.   Edema:     Peripheral edema present.     Ankle: bilateral 2+ pitting edema of the ankle.  Abdominal:      General: Bowel sounds are normal.   Musculoskeletal:      Cervical back: Normal  range of motion. Skin:     General: Skin is warm and dry.   Neurological:      Mental Status: Alert and oriented to person, place and time.       Results Review:   Lab Results (last 72 hours)       Procedure Component Value Units Date/Time    Basic Metabolic Panel [[id number]]  (Abnormal) Collected: 11/19/23 0423    Specimen: Blood Updated: 11/19/23 0528     Glucose 112 mg/dL      BUN 16 mg/dL      Creatinine 0.87 mg/dL      Sodium 139 mmol/L      Potassium 4.0 mmol/L      Chloride 96 mmol/L      CO2 39.0 mmol/L      Calcium 8.3 mg/dL      BUN/Creatinine Ratio 18.4     Anion Gap 4.0 mmol/L      eGFR 91.1 mL/min/1.73     Narrative:      GFR Normal >60  Chronic Kidney Disease <60  Kidney Failure <15    The GFR formula is only valid for adults with stable renal function between ages 18 and 70.    CBC & Differential [[id number]]  (Abnormal) Collected: 11/19/23 0423    Specimen: Blood Updated: 11/19/23 0507    Narrative:      The following orders were created for panel order CBC & Differential.  Procedure                               Abnormality         Status                     ---------                               -----------         ------                     CBC Auto Differential[[id number]]        Abnormal            Final result                 Please view results for these tests on the individual orders.    CBC Auto Differential [[id number]]  (Abnormal) Collected: 11/19/23 0423    Specimen: Blood Updated: 11/19/23 0507     WBC 7.13 10*3/mm3      RBC 3.87 10*6/mm3      Hemoglobin 10.7 g/dL      Hematocrit 35.7 %      MCV 92.2 fL      MCH 27.6 pg      MCHC 30.0 g/dL      RDW 15.5 %      RDW-SD 52.1 fl      MPV 9.5 fL      Platelets 452 10*3/mm3      Neutrophil % 61.2 %      Lymphocyte % 22.4 %      Monocyte % 14.9 %      Eosinophil % 0.4 %      Basophil % 0.4 %      Immature Grans % 0.7 %      Neutrophils, Absolute 4.36 10*3/mm3      Lymphocytes, Absolute 1.60 10*3/mm3      Monocytes, Absolute 1.06 10*3/mm3       Eosinophils, Absolute 0.03 10*3/mm3      Basophils, Absolute 0.03 10*3/mm3      Immature Grans, Absolute 0.05 10*3/mm3      nRBC 0.0 /100 WBC     Blood Culture - Blood, Arm, Right [[id number]]  (Normal) Collected: 11/16/23 1505    Specimen: Blood from Arm, Right Updated: 11/18/23 1531     Blood Culture No growth at 2 days    Blood Culture - Blood, Arm, Right [[id number]]  (Normal) Collected: 11/16/23 1450    Specimen: Blood from Arm, Right Updated: 11/18/23 1531     Blood Culture No growth at 2 days    High Sensitivity Troponin T 2Hr [[id number]]  (Normal) Collected: 11/18/23 0608    Specimen: Blood Updated: 11/18/23 0639     HS Troponin T 20 ng/L      Troponin T Delta -1 ng/L     Narrative:      High Sensitive Troponin T Reference Range:  <14.0 ng/L- Negative Female for AMI  <22.0 ng/L- Negative Male for AMI  >=14 - Abnormal Female indicating possible myocardial injury.  >=22 - Abnormal Male indicating possible myocardial injury.   Clinicians would have to utilize clinical acumen, EKG, Troponin, and serial changes to determine if it is an Acute Myocardial Infarction or myocardial injury due to an underlying chronic condition.         Comprehensive Metabolic Panel [[id number]]  (Abnormal) Collected: 11/18/23 0435    Specimen: Blood Updated: 11/18/23 0534     Glucose 125 mg/dL      BUN 14 mg/dL      Creatinine 0.73 mg/dL      Sodium 141 mmol/L      Potassium 3.7 mmol/L      Chloride 98 mmol/L      CO2 37.0 mmol/L      Calcium 8.5 mg/dL      Total Protein 5.7 g/dL      Albumin 2.7 g/dL      ALT (SGPT) 13 U/L      AST (SGOT) 23 U/L      Alkaline Phosphatase 68 U/L      Total Bilirubin 0.4 mg/dL      Globulin 3.0 gm/dL      A/G Ratio 0.9 g/dL      BUN/Creatinine Ratio 19.2     Anion Gap 6.0 mmol/L      eGFR 96.1 mL/min/1.73     Narrative:      GFR Normal >60  Chronic Kidney Disease <60  Kidney Failure <15    The GFR formula is only valid for adults with stable renal function between ages 18 and 70.    High Sensitivity  Troponin T [[id number]]  (Normal) Collected: 11/18/23 0435    Specimen: Blood Updated: 11/18/23 0534     HS Troponin T 21 ng/L     Narrative:      High Sensitive Troponin T Reference Range:  <14.0 ng/L- Negative Female for AMI  <22.0 ng/L- Negative Male for AMI  >=14 - Abnormal Female indicating possible myocardial injury.  >=22 - Abnormal Male indicating possible myocardial injury.   Clinicians would have to utilize clinical acumen, EKG, Troponin, and serial changes to determine if it is an Acute Myocardial Infarction or myocardial injury due to an underlying chronic condition.         CBC & Differential [[id number]]  (Abnormal) Collected: 11/18/23 0435    Specimen: Blood Updated: 11/18/23 0507    Narrative:      The following orders were created for panel order CBC & Differential.  Procedure                               Abnormality         Status                     ---------                               -----------         ------                     CBC Auto Differential[[id number]]        Abnormal            Final result                 Please view results for these tests on the individual orders.    CBC Auto Differential [[id number]]  (Abnormal) Collected: 11/18/23 0435    Specimen: Blood Updated: 11/18/23 0507     WBC 5.70 10*3/mm3      RBC 3.92 10*6/mm3      Hemoglobin 10.9 g/dL      Hematocrit 36.3 %      MCV 92.6 fL      MCH 27.8 pg      MCHC 30.0 g/dL      RDW 15.4 %      RDW-SD 52.6 fl      MPV 9.4 fL      Platelets 440 10*3/mm3      Neutrophil % 54.0 %      Lymphocyte % 26.8 %      Monocyte % 17.4 %      Eosinophil % 0.7 %      Basophil % 0.4 %      Immature Grans % 0.7 %      Neutrophils, Absolute 3.08 10*3/mm3      Lymphocytes, Absolute 1.53 10*3/mm3      Monocytes, Absolute 0.99 10*3/mm3      Eosinophils, Absolute 0.04 10*3/mm3      Basophils, Absolute 0.02 10*3/mm3      Immature Grans, Absolute 0.04 10*3/mm3      nRBC 0.0 /100 WBC     BNP [[id number]]  (Abnormal) Collected: 11/17/23 0447    Specimen:  Blood Updated: 11/17/23 1912     proBNP 2,561.0 pg/mL     Narrative:      This assay is used as an aid in the diagnosis of individuals suspected of having heart failure. It can be used as an aid in the diagnosis of acute decompensated heart failure (ADHF) in patients presenting with signs and symptoms of ADHF to the emergency department (ED). In addition, NT-proBNP of <300 pg/mL indicates ADHF is not likely.    Age Range Result Interpretation  NT-proBNP Concentration (pg/mL:      <50             Positive            >450                   Gray                 300-450                    Negative             <300    50-75           Positive            >900                  Gray                300-900                  Negative            <300      >75             Positive            >1800                  Gray                300-1800                  Negative            <300    Comprehensive Metabolic Panel [[id number]]  (Abnormal) Collected: 11/17/23 0447    Specimen: Blood Updated: 11/17/23 0557     Glucose 115 mg/dL      BUN 16 mg/dL      Creatinine 0.62 mg/dL      Sodium 138 mmol/L      Potassium 4.2 mmol/L      Chloride 98 mmol/L      CO2 35.0 mmol/L      Calcium 8.7 mg/dL      Total Protein 6.0 g/dL      Albumin 2.9 g/dL      ALT (SGPT) 17 U/L      AST (SGOT) 31 U/L      Alkaline Phosphatase 68 U/L      Total Bilirubin 0.5 mg/dL      Globulin 3.1 gm/dL      A/G Ratio 0.9 g/dL      BUN/Creatinine Ratio 25.8     Anion Gap 5.0 mmol/L      eGFR 100.9 mL/min/1.73     Narrative:      GFR Normal >60  Chronic Kidney Disease <60  Kidney Failure <15    The GFR formula is only valid for adults with stable renal function between ages 18 and 70.    Magnesium [[id number]]  (Normal) Collected: 11/17/23 0447    Specimen: Blood Updated: 11/17/23 0557     Magnesium 1.8 mg/dL     CBC & Differential [[id number]]  (Abnormal) Collected: 11/17/23 0447    Specimen: Blood Updated: 11/17/23 0530    Narrative:      The following orders were  created for panel order CBC & Differential.  Procedure                               Abnormality         Status                     ---------                               -----------         ------                     CBC Auto Differential[[id number]]        Abnormal            Final result                 Please view results for these tests on the individual orders.    CBC Auto Differential [[id number]]  (Abnormal) Collected: 11/17/23 0447    Specimen: Blood Updated: 11/17/23 0530     WBC 5.68 10*3/mm3      RBC 4.19 10*6/mm3      Hemoglobin 11.7 g/dL      Hematocrit 38.6 %      MCV 92.1 fL      MCH 27.9 pg      MCHC 30.3 g/dL      RDW 15.5 %      RDW-SD 52.1 fl      MPV 10.0 fL      Platelets 431 10*3/mm3      Neutrophil % 62.9 %      Lymphocyte % 18.8 %      Monocyte % 16.5 %      Eosinophil % 0.7 %      Basophil % 0.4 %      Immature Grans % 0.7 %      Neutrophils, Absolute 3.57 10*3/mm3      Lymphocytes, Absolute 1.07 10*3/mm3      Monocytes, Absolute 0.94 10*3/mm3      Eosinophils, Absolute 0.04 10*3/mm3      Basophils, Absolute 0.02 10*3/mm3      Immature Grans, Absolute 0.04 10*3/mm3      nRBC 0.0 /100 WBC     High Sensitivity Troponin T 2Hr [[id number]]  (Normal) Collected: 11/16/23 2033    Specimen: Blood Updated: 11/16/23 2106     HS Troponin T 16 ng/L      Troponin T Delta 0 ng/L     Narrative:      High Sensitive Troponin T Reference Range:  <14.0 ng/L- Negative Female for AMI  <22.0 ng/L- Negative Male for AMI  >=14 - Abnormal Female indicating possible myocardial injury.  >=22 - Abnormal Male indicating possible myocardial injury.   Clinicians would have to utilize clinical acumen, EKG, Troponin, and serial changes to determine if it is an Acute Myocardial Infarction or myocardial injury due to an underlying chronic condition.         High Sensitivity Troponin T [[id number]]  (Normal) Collected: 11/16/23 1842    Specimen: Blood Updated: 11/16/23 2004     HS Troponin T 16 ng/L     Narrative:      High  Sensitive Troponin T Reference Range:  <14.0 ng/L- Negative Female for AMI  <22.0 ng/L- Negative Male for AMI  >=14 - Abnormal Female indicating possible myocardial injury.  >=22 - Abnormal Male indicating possible myocardial injury.   Clinicians would have to utilize clinical acumen, EKG, Troponin, and serial changes to determine if it is an Acute Myocardial Infarction or myocardial injury due to an underlying chronic condition.         Lactic Acid, Plasma [[id number]]  (Normal) Collected: 11/16/23 1450    Specimen: Blood from Arm, Right Updated: 11/16/23 1533     Lactate 1.3 mmol/L     COVID PRE-OP / PRE-PROCEDURE SCREENING ORDER (NO ISOLATION) - Swab, Nasal Cavity [[id number]]  (Normal) Collected: 11/16/23 1148    Specimen: Swab from Nasal Cavity Updated: 11/16/23 1238    Narrative:      The following orders were created for panel order COVID PRE-OP / PRE-PROCEDURE SCREENING ORDER (NO ISOLATION) - Swab, Nasal Cavity.  Procedure                               Abnormality         Status                     ---------                               -----------         ------                     COVID-19 and FLU A/B PCR...[[id number]]  Normal              Final result                 Please view results for these tests on the individual orders.    COVID-19 and FLU A/B PCR, 1 HR TAT - Swab, Nasopharynx [[id number]]  (Normal) Collected: 11/16/23 1148    Specimen: Swab from Nasopharynx Updated: 11/16/23 1238     COVID19 Not Detected     Influenza A PCR Not Detected     Influenza B PCR Not Detected    Narrative:      Fact sheet for providers: https://www.fda.gov/media/948457/download    Fact sheet for patients: https://www.fda.gov/media/274321/download    Test performed by PCR.    TSH [[id number]]  (Normal) Collected: 11/16/23 1107    Specimen: Blood Updated: 11/16/23 1157     TSH 1.360 uIU/mL     Procalcitonin [[id number]]  (Normal) Collected: 11/16/23 1107    Specimen: Blood Updated: 11/16/23 1156     Procalcitonin 0.10 ng/mL   "   Narrative:      As a Marker for Sepsis (Non-Neonates):    1. <0.5 ng/mL represents a low risk of severe sepsis and/or septic shock.  2. >2 ng/mL represents a high risk of severe sepsis and/or septic shock.    As a Marker for Lower Respiratory Tract Infections that require antibiotic therapy:    PCT on Admission    Antibiotic Therapy       6-12 Hrs later    >0.5                Strongly Recommended  >0.25 - <0.5        Recommended   0.1 - 0.25          Discouraged              Remeasure/reassess PCT  <0.1                Strongly Discouraged     Remeasure/reassess PCT    As 28 day mortality risk marker: \"Change in Procalcitonin Result\" (>80% or <=80%) if Day 0 (or Day 1) and Day 4 values are available. Refer to [email or web address]    Change in PCT <=80%  A decrease of PCT levels below or equal to 80% defines a positive change in PCT test result representing a higher risk for 28-day all-cause mortality of patients diagnosed with severe sepsis for septic shock.    Change in PCT >80%  A decrease of PCT levels of more than 80% defines a negative change in PCT result representing a lower risk for 28-day all-cause mortality of patients diagnosed with severe sepsis or septic shock.       T4, Free [[id number]]  (Normal) Collected: 11/16/23 1107    Specimen: Blood Updated: 11/16/23 1156     Free T4 1.08 ng/dL     Narrative:      Results may be falsely increased if patient taking Biotin.      Basic Metabolic Panel [[id number]]  (Abnormal) Collected: 11/16/23 1107    Specimen: Blood Updated: 11/16/23 1154     Glucose 124 mg/dL      BUN 26 mg/dL      Creatinine 0.58 mg/dL      Sodium 136 mmol/L      Potassium 4.7 mmol/L      Chloride 97 mmol/L      CO2 32.0 mmol/L      Calcium 9.1 mg/dL      BUN/Creatinine Ratio 44.8     Anion Gap 7.0 mmol/L      eGFR 103.0 mL/min/1.73     Narrative:      GFR Normal >60  Chronic Kidney Disease <60  Kidney Failure <15    The GFR formula is only valid for adults with stable renal " function between ages 18 and 70.    C-reactive Protein [[id number]]  (Abnormal) Collected: 11/16/23 1107    Specimen: Blood Updated: 11/16/23 1154     C-Reactive Protein 6.32 mg/dL     Magnesium [[id number]]  (Normal) Collected: 11/16/23 1107    Specimen: Blood Updated: 11/16/23 1151     Magnesium 1.9 mg/dL     BNP [[id number]]  (Abnormal) Collected: 11/16/23 1107    Specimen: Blood Updated: 11/16/23 1150     proBNP 4,048.0 pg/mL     Narrative:      This assay is used as an aid in the diagnosis of individuals suspected of having heart failure. It can be used as an aid in the diagnosis of acute decompensated heart failure (ADHF) in patients presenting with signs and symptoms of ADHF to the emergency department (ED). In addition, NT-proBNP of <300 pg/mL indicates ADHF is not likely.    Age Range Result Interpretation  NT-proBNP Concentration (pg/mL:      <50             Positive            >450                   Gray                 300-450                    Negative             <300    50-75           Positive            >900                  Gray                300-900                  Negative            <300      >75             Positive            >1800                  Gray                300-1800                  Negative            <300    Single High Sensitivity Troponin T [[id number]]  (Normal) Collected: 11/16/23 1107    Specimen: Blood Updated: 11/16/23 1150     HS Troponin T 17 ng/L     Narrative:      High Sensitive Troponin T Reference Range:  <14.0 ng/L- Negative Female for AMI  <22.0 ng/L- Negative Male for AMI  >=14 - Abnormal Female indicating possible myocardial injury.  >=22 - Abnormal Male indicating possible myocardial injury.   Clinicians would have to utilize clinical acumen, EKG, Troponin, and serial changes to determine if it is an Acute Myocardial Infarction or myocardial injury due to an underlying chronic condition.         Protime-INR [[id number]]  (Abnormal) Collected: 11/16/23 1107     "Specimen: Blood Updated: 11/16/23 1139     Protime 17.7 Seconds      INR 1.44    D-dimer, Quantitative [[id number]]  (Abnormal) Collected: 11/16/23 1107    Specimen: Blood Updated: 11/16/23 1139     D-Dimer, Quantitative 1.02 MCGFEU/mL     Narrative:      According to the assay 's published package insert, a normal (<0.50 MCGFEU/mL) D-dimer result in conjunction with a non-high clinical probability assessment, excludes deep vein thrombosis (DVT) and pulmonary embolism (PE) with high sensitivity.    D-dimer values increase with age and this can make VTE exclusion of an older population difficult. To address this, the [employer], based on best available evidence and recent guidelines, recommends that clinicians use age-adjusted D-dimer thresholds in patients greater than 50 years of age with: a) a low probability of PE who do not meet all Pulmonary Embolism Rule Out Criteria, or b) in those with intermediate probability of PE.   The formula for an age-adjusted D-dimer cut-off is \"age/100\".  For example, a 60 year old patient would have an age-adjusted cut-off of 0.60 MCGFEU/mL and an 80 year old 0.80 MCGFEU/mL.    CBC & Differential [[id number]]  (Abnormal) Collected: 11/16/23 1107    Specimen: Blood Updated: 11/16/23 1128    Narrative:      The following orders were created for panel order CBC & Differential.  Procedure                               Abnormality         Status                     ---------                               -----------         ------                     CBC Auto Differential[[id number]]        Abnormal            Final result                 Please view results for these tests on the individual orders.    CBC Auto Differential [[id number]]  (Abnormal) Collected: 11/16/23 1107    Specimen: Blood Updated: 11/16/23 1128     WBC 7.30 10*3/mm3      RBC 4.61 10*6/mm3      Hemoglobin 12.8 g/dL      Hematocrit 43.2 %      MCV 93.7 fL      MCH 27.8 pg      MCHC 29.6 g/dL "      RDW 15.5 %      RDW-SD 53.3 fl      MPV 9.6 fL      Platelets 453 10*3/mm3      Neutrophil % 75.5 %      Lymphocyte % 13.4 %      Monocyte % 9.7 %      Eosinophil % 0.3 %      Basophil % 0.4 %      Immature Grans % 0.7 %      Neutrophils, Absolute 5.51 10*3/mm3      Lymphocytes, Absolute 0.98 10*3/mm3      Monocytes, Absolute 0.71 10*3/mm3      Eosinophils, Absolute 0.02 10*3/mm3      Basophils, Absolute 0.03 10*3/mm3      Immature Grans, Absolute 0.05 10*3/mm3      nRBC 0.0 /100 WBC           Results from last 7 days   Lab Units 11/19/23  0423 11/18/23  0435 11/17/23  0447   SODIUM mmol/L 139 141 138   POTASSIUM mmol/L 4.0 3.7 4.2   CHLORIDE mmol/L 96* 98 98   CO2 mmol/L 39.0* 37.0* 35.0*   BUN mg/dL 16 14 16   CREATININE mg/dL 0.87 0.73* 0.62*   GLUCOSE mg/dL 112* 125* 115*   CALCIUM mg/dL 8.3* 8.5* 8.7        Results for orders placed during the hospital encounter of 11/16/23    Adult Transthoracic Echo Complete w/ Color, Spectral and Contrast if Necessary Per Protocol    Interpretation Summary    Left ventricular systolic function is mildly decreased. Left ventricular ejection fraction appears to be 41 - 45%.    Left ventricular wall thickness is consistent with mild concentric hypertrophy.    Moderately reduced right ventricular systolic function noted.    Biatrial enlargement is noted.    Mild mitral valve regurgitation is present.    Mild tricuspid valve regurgitation is present. Estimated right ventricular systolic pressure from tricuspid regurgitation is markedly elevated (>55 mmHg).    A small to moderate size circumferential pericardial effusion is noted with the larger amount of fluid seen posterior to the heart with no obvious echocardiographic signs of tamponade.     Medication Review: yes  Current Facility-Administered Medications   Medication Dose Route Frequency Provider Last Rate Last Admin    budesonide-formoterol (SYMBICORT) 160-4.5 MCG/ACT inhaler 2 puff  2 puff Inhalation BID - RT [place],  Arjun Baron MD   2 puff at 11/19/23 0640    cefTRIAXone (ROCEPHIN) 1,000 mg in sodium chloride 0.9 % 100 mL IVPB  1,000 mg Intravenous Q24H Arjun Roman MD   Stopped at 11/18/23 1634    digoxin (LANOXIN) tablet 125 mcg  125 mcg Oral Daily Amy Chou MD   125 mcg at 11/18/23 1149    dilTIAZem (CARDIZEM) 125 mg in 125 mL D5W infusion  5-15 mg/hr Intravenous Titrated Arjun Roman MD   Held at 11/17/23 2359    dilTIAZem (CARDIZEM) tablet 90 mg  90 mg Oral Q6H Amy Chou MD   90 mg at 11/19/23 0513    doxycycline (VIBRAMYCIN) 100 mg in sodium chloride 0.9 % 100 mL IVPB  100 mg Intravenous Q12H Arjun Roman MD   100 mg at 11/19/23 0600    DULoxetine (CYMBALTA) DR capsule 60 mg  60 mg Oral Daily Arjun Roman MD   60 mg at 11/18/23 0809    fenofibrate (TRICOR) tablet 145 mg  145 mg Oral Daily Arjun Roman MD   145 mg at 11/18/23 0809    folic acid (FOLVITE) tablet 1 mg  1 mg Oral Daily Arjun Roman MD   1 mg at 11/18/23 0809    furosemide (LASIX) 500 mg in 50mL infusion  10 mg/hr Intravenous Continuous Arjun Roman MD 1 mL/hr at 11/19/23 0158 10 mg/hr at 11/19/23 0158    gabapentin (NEURONTIN) capsule 600 mg  600 mg Oral Q8H Arjun Roman MD   600 mg at 11/19/23 0513    HYDROcodone-acetaminophen (NORCO)  MG per tablet 1 tablet  1 tablet Oral Q8H PRN Arjun Roman MD   1 tablet at 11/17/23 1501    ipratropium-albuterol (DUO-NEB) nebulizer solution 3 mL  3 mL Nebulization 4x Daily - RT Arjun Roman MD   3 mL at 11/19/23 0640    lisinopril (PRINIVIL,ZESTRIL) tablet 5 mg  5 mg Oral Q24H Gunnar Foreman APRN        LORazepam (ATIVAN) tablet 1 mg  1 mg Oral Q6H Arjun Roman MD        Magnesium Standard Dose Replacement - Follow Nurse / BPA Driven Protocol   Does not apply PRN Arjun Roman MD        pantoprazole (PROTONIX) EC tablet 40 mg  40 mg Oral Q AM Arjun Roman MD   40 mg at 11/19/23 0513    rivaroxaban (XARELTO) tablet 20  mg  20 mg Oral Daily With Dinner Arjun Roman MD   20 mg at 11/18/23 1756    rosuvastatin (CRESTOR) tablet 10 mg  10 mg Oral Daily Arjun Roman MD   10 mg at 11/18/23 0809    tamsulosin (FLOMAX) 24 hr capsule 0.4 mg  0.4 mg Oral Daily Arjun Roman MD   0.4 mg at 11/18/23 0809    temazepam (RESTORIL) capsule 15 mg  15 mg Oral Nightly PRN Arjun Roman MD   15 mg at 11/17/23 2134    thiamine (B-1) injection 200 mg  200 mg Intravenous Q8H Arjun Roman MD   200 mg at 11/19/23 0513    Followed by    [START ON 11/23/2023] thiamine (VITAMIN B-1) tablet 100 mg  100 mg Oral Daily Arjun Roman MD             Assessment & Plan       Atrial fibrillation with rapid ventricular response    Essential hypertension    Chronic respiratory failure with hypoxia, on home O2 therapy    Acute combined systolic and diastolic congestive heart failure    Pericardial effusion    Plan:  Persistent Atrial Fibrillation- with elevated rates. Overall rate trend is much improved today. Running 102- 117. Continue Cardizem and Digoxin. Dr. Chou will return tomorrow. Plans for possible PPM and AVN ablation.     Acute on combined congestive heart failure. Drop in LVEF to 41-45%. On Lasix drip- with good response. Still appears volume overload. Low Salt Diet- he has several items in his room I would consider not low salt items (ie gatorade). Lisinopril 40  held yesterday due to hypotension. Will start Lisinopril 5 mg daily. On Lopressor at home- but not resumed- remains overloaded and with COPD- hold for now but consider in future. Start daily weights. Continue I&O.     Hypertension- now with hypotension. Monitor. Lisinopril reduced to 5 mg.     Pericardial Effusion - small to moderate      Further orders per Dr. Boles. Covering for Dr. Chou who will return tomorrow and resume care.     Electronically signed by VIVIANA Burton, 11/19/23, 9:41 AM CST.

## 2023-11-19 NOTE — PROGRESS NOTES
Daily Progress Note  Burt Mcdonald  MRN: [id number] LOS: 3    Admit Date: 11/16/2023 11/19/2023 11:16 CST    Subjective:      Chief Complaint:  Chief Complaint   Patient presents with    Atrial Fibrillation    Cough    Shortness of Breath       Interval History:    Reviewed overnight events and nursing notes.   The patient is resting comfortably this morning. Less shaky and sob is improving.    Review of Systems   Constitutional:  Positive for activity change and fatigue.   HENT:  Positive for congestion.    Respiratory:  Positive for cough and shortness of breath.    Cardiovascular: Negative.    Gastrointestinal: Negative.    Genitourinary: Negative.    Neurological:  Positive for tremors and weakness.       DIET:  Diet: Cardiac Diets; Healthy Heart (2-3 Na+); Texture: Regular Texture (IDDSI 7); Fluid Consistency: Thin (IDDSI 0)    Medications:   dilTIAZem, 5-15 mg/hr, Last Rate: Stopped (11/17/23 4194)  furosemide, 10 mg/hr, Last Rate: 10 mg/hr (11/19/23 3674)      budesonide-formoterol, 2 puff, Inhalation, BID - RT  cefTRIAXone, 1,000 mg, Intravenous, Q24H  digoxin, 125 mcg, Oral, Daily  dilTIAZem, 90 mg, Oral, Q6H  doxycycline, 100 mg, Intravenous, Q12H  DULoxetine, 60 mg, Oral, Daily  fenofibrate, 145 mg, Oral, Daily  folic acid, 1 mg, Oral, Daily  gabapentin, 600 mg, Oral, Q8H  ipratropium-albuterol, 3 mL, Nebulization, 4x Daily - RT  lisinopril, 5 mg, Oral, Q24H  LORazepam, 1 mg, Oral, Q6H  pantoprazole, 40 mg, Oral, Q AM  rivaroxaban, 20 mg, Oral, Daily With Dinner  rosuvastatin, 10 mg, Oral, Daily  tamsulosin, 0.4 mg, Oral, Daily  thiamine (B-1) IV, 200 mg, Intravenous, Q8H   Followed by  [START ON 11/23/2023] thiamine, 100 mg, Oral, Daily        Data:     Code Status:   Code Status and Medical Interventions:   Ordered at: 11/16/23 1812     Level Of Support Discussed With:    Patient     Code Status (Patient has no pulse and is not breathing):    CPR (Attempt to Resuscitate)     Medical  Interventions (Patient has pulse or is breathing):    Full Support       Family History   Problem Relation Age of Onset    Cancer Father         LUNG    Cancer Mother         LUNG    No Known Problems Sister     No Known Problems Brother     No Known Problems Brother     No Known Problems Brother      Social History     Socioeconomic History    Marital status: Single   Tobacco Use    Smoking status: Every Day     Packs/day: 1     Types: Cigarettes    Smokeless tobacco: Current     Types: Chew   Vaping Use    Vaping Use: Never used   Substance and Sexual Activity    Alcohol use: Yes     Alcohol/week: 3.0 standard drinks of alcohol     Types: 2 Cans of beer, 1 Shots of liquor per week     Comment: daily    Drug use: No    Sexual activity: Defer       Labs:    Lab Results (last 72 hours)       Procedure Component Value Units Date/Time    High Sensitivity Troponin T 2Hr [[id number]]  (Normal) Collected: 11/18/23 0608    Specimen: Blood Updated: 11/18/23 0639     HS Troponin T 20 ng/L      Troponin T Delta -1 ng/L     Narrative:      High Sensitive Troponin T Reference Range:  <14.0 ng/L- Negative Female for AMI  <22.0 ng/L- Negative Male for AMI  >=14 - Abnormal Female indicating possible myocardial injury.  >=22 - Abnormal Male indicating possible myocardial injury.   Clinicians would have to utilize clinical acumen, EKG, Troponin, and serial changes to determine if it is an Acute Myocardial Infarction or myocardial injury due to an underlying chronic condition.         Comprehensive Metabolic Panel [[id number]]  (Abnormal) Collected: 11/18/23 0435    Specimen: Blood Updated: 11/18/23 0534     Glucose 125 mg/dL      BUN 14 mg/dL      Creatinine 0.73 mg/dL      Sodium 141 mmol/L      Potassium 3.7 mmol/L      Chloride 98 mmol/L      CO2 37.0 mmol/L      Calcium 8.5 mg/dL      Total Protein 5.7 g/dL      Albumin 2.7 g/dL      ALT (SGPT) 13 U/L      AST (SGOT) 23 U/L      Alkaline Phosphatase 68 U/L      Total Bilirubin 0.4  mg/dL      Globulin 3.0 gm/dL      A/G Ratio 0.9 g/dL      BUN/Creatinine Ratio 19.2     Anion Gap 6.0 mmol/L      eGFR 96.1 mL/min/1.73     Narrative:      GFR Normal >60  Chronic Kidney Disease <60  Kidney Failure <15    The GFR formula is only valid for adults with stable renal function between ages 18 and 70.    High Sensitivity Troponin T [[id number]]  (Normal) Collected: 11/18/23 0435    Specimen: Blood Updated: 11/18/23 0534     HS Troponin T 21 ng/L     Narrative:      High Sensitive Troponin T Reference Range:  <14.0 ng/L- Negative Female for AMI  <22.0 ng/L- Negative Male for AMI  >=14 - Abnormal Female indicating possible myocardial injury.  >=22 - Abnormal Male indicating possible myocardial injury.   Clinicians would have to utilize clinical acumen, EKG, Troponin, and serial changes to determine if it is an Acute Myocardial Infarction or myocardial injury due to an underlying chronic condition.         CBC & Differential [[id number]]  (Abnormal) Collected: 11/18/23 0435    Specimen: Blood Updated: 11/18/23 0507    Narrative:      The following orders were created for panel order CBC & Differential.  Procedure                               Abnormality         Status                     ---------                               -----------         ------                     CBC Auto Differential[[id number]]        Abnormal            Final result                 Please view results for these tests on the individual orders.    CBC Auto Differential [[id number]]  (Abnormal) Collected: 11/18/23 0435    Specimen: Blood Updated: 11/18/23 0507     WBC 5.70 10*3/mm3      RBC 3.92 10*6/mm3      Hemoglobin 10.9 g/dL      Hematocrit 36.3 %      MCV 92.6 fL      MCH 27.8 pg      MCHC 30.0 g/dL      RDW 15.4 %      RDW-SD 52.6 fl      MPV 9.4 fL      Platelets 440 10*3/mm3      Neutrophil % 54.0 %      Lymphocyte % 26.8 %      Monocyte % 17.4 %      Eosinophil % 0.7 %      Basophil % 0.4 %      Immature Grans % 0.7 %       Neutrophils, Absolute 3.08 10*3/mm3      Lymphocytes, Absolute 1.53 10*3/mm3      Monocytes, Absolute 0.99 10*3/mm3      Eosinophils, Absolute 0.04 10*3/mm3      Basophils, Absolute 0.02 10*3/mm3      Immature Grans, Absolute 0.04 10*3/mm3      nRBC 0.0 /100 WBC     BNP [[id number]]  (Abnormal) Collected: 11/17/23 0447    Specimen: Blood Updated: 11/17/23 1912     proBNP 2,561.0 pg/mL     Narrative:      This assay is used as an aid in the diagnosis of individuals suspected of having heart failure. It can be used as an aid in the diagnosis of acute decompensated heart failure (ADHF) in patients presenting with signs and symptoms of ADHF to the emergency department (ED). In addition, NT-proBNP of <300 pg/mL indicates ADHF is not likely.    Age Range Result Interpretation  NT-proBNP Concentration (pg/mL:      <50             Positive            >450                   Gray                 300-450                    Negative             <300    50-75           Positive            >900                  Gray                300-900                  Negative            <300      >75             Positive            >1800                  Gray                300-1800                  Negative            <300    Blood Culture - Blood, Arm, Right [[id number]]  (Normal) Collected: 11/16/23 1505    Specimen: Blood from Arm, Right Updated: 11/17/23 1532     Blood Culture No growth at 24 hours    Blood Culture - Blood, Arm, Right [[id number]]  (Normal) Collected: 11/16/23 1450    Specimen: Blood from Arm, Right Updated: 11/17/23 1532     Blood Culture No growth at 24 hours    Comprehensive Metabolic Panel [[id number]]  (Abnormal) Collected: 11/17/23 0447    Specimen: Blood Updated: 11/17/23 0557     Glucose 115 mg/dL      BUN 16 mg/dL      Creatinine 0.62 mg/dL      Sodium 138 mmol/L      Potassium 4.2 mmol/L      Chloride 98 mmol/L      CO2 35.0 mmol/L      Calcium 8.7 mg/dL      Total Protein 6.0 g/dL      Albumin 2.9 g/dL      ALT  (SGPT) 17 U/L      AST (SGOT) 31 U/L      Alkaline Phosphatase 68 U/L      Total Bilirubin 0.5 mg/dL      Globulin 3.1 gm/dL      A/G Ratio 0.9 g/dL      BUN/Creatinine Ratio 25.8     Anion Gap 5.0 mmol/L      eGFR 100.9 mL/min/1.73     Narrative:      GFR Normal >60  Chronic Kidney Disease <60  Kidney Failure <15    The GFR formula is only valid for adults with stable renal function between ages 18 and 70.    Magnesium [[id number]]  (Normal) Collected: 11/17/23 0447    Specimen: Blood Updated: 11/17/23 0557     Magnesium 1.8 mg/dL     CBC & Differential [[id number]]  (Abnormal) Collected: 11/17/23 0447    Specimen: Blood Updated: 11/17/23 0530    Narrative:      The following orders were created for panel order CBC & Differential.  Procedure                               Abnormality         Status                     ---------                               -----------         ------                     CBC Auto Differential[[id number]]        Abnormal            Final result                 Please view results for these tests on the individual orders.    CBC Auto Differential [[id number]]  (Abnormal) Collected: 11/17/23 0447    Specimen: Blood Updated: 11/17/23 0530     WBC 5.68 10*3/mm3      RBC 4.19 10*6/mm3      Hemoglobin 11.7 g/dL      Hematocrit 38.6 %      MCV 92.1 fL      MCH 27.9 pg      MCHC 30.3 g/dL      RDW 15.5 %      RDW-SD 52.1 fl      MPV 10.0 fL      Platelets 431 10*3/mm3      Neutrophil % 62.9 %      Lymphocyte % 18.8 %      Monocyte % 16.5 %      Eosinophil % 0.7 %      Basophil % 0.4 %      Immature Grans % 0.7 %      Neutrophils, Absolute 3.57 10*3/mm3      Lymphocytes, Absolute 1.07 10*3/mm3      Monocytes, Absolute 0.94 10*3/mm3      Eosinophils, Absolute 0.04 10*3/mm3      Basophils, Absolute 0.02 10*3/mm3      Immature Grans, Absolute 0.04 10*3/mm3      nRBC 0.0 /100 WBC     High Sensitivity Troponin T 2Hr [[id number]]  (Normal) Collected: 11/16/23 2033    Specimen: Blood Updated: 11/16/23  2106     HS Troponin T 16 ng/L      Troponin T Delta 0 ng/L     Narrative:      High Sensitive Troponin T Reference Range:  <14.0 ng/L- Negative Female for AMI  <22.0 ng/L- Negative Male for AMI  >=14 - Abnormal Female indicating possible myocardial injury.  >=22 - Abnormal Male indicating possible myocardial injury.   Clinicians would have to utilize clinical acumen, EKG, Troponin, and serial changes to determine if it is an Acute Myocardial Infarction or myocardial injury due to an underlying chronic condition.         High Sensitivity Troponin T [[id number]]  (Normal) Collected: 11/16/23 1842    Specimen: Blood Updated: 11/16/23 2004     HS Troponin T 16 ng/L     Narrative:      High Sensitive Troponin T Reference Range:  <14.0 ng/L- Negative Female for AMI  <22.0 ng/L- Negative Male for AMI  >=14 - Abnormal Female indicating possible myocardial injury.  >=22 - Abnormal Male indicating possible myocardial injury.   Clinicians would have to utilize clinical acumen, EKG, Troponin, and serial changes to determine if it is an Acute Myocardial Infarction or myocardial injury due to an underlying chronic condition.         Lactic Acid, Plasma [[id number]]  (Normal) Collected: 11/16/23 1450    Specimen: Blood from Arm, Right Updated: 11/16/23 1533     Lactate 1.3 mmol/L     COVID PRE-OP / PRE-PROCEDURE SCREENING ORDER (NO ISOLATION) - Swab, Nasal Cavity [[id number]]  (Normal) Collected: 11/16/23 1148    Specimen: Swab from Nasal Cavity Updated: 11/16/23 1238    Narrative:      The following orders were created for panel order COVID PRE-OP / PRE-PROCEDURE SCREENING ORDER (NO ISOLATION) - Swab, Nasal Cavity.  Procedure                               Abnormality         Status                     ---------                               -----------         ------                     COVID-19 and FLU A/B PCR...[[id number]]  Normal              Final result                 Please view results for these tests on the individual  "orders.    COVID-19 and FLU A/B PCR, 1 HR TAT - Swab, Nasopharynx [[id number]]  (Normal) Collected: 11/16/23 1148    Specimen: Swab from Nasopharynx Updated: 11/16/23 1238     COVID19 Not Detected     Influenza A PCR Not Detected     Influenza B PCR Not Detected    Narrative:      Fact sheet for providers: https://www.fda.gov/media/902621/download    Fact sheet for patients: https://www.fda.gov/media/457373/download    Test performed by PCR.    TSH [[id number]]  (Normal) Collected: 11/16/23 1107    Specimen: Blood Updated: 11/16/23 1157     TSH 1.360 uIU/mL     Procalcitonin [[id number]]  (Normal) Collected: 11/16/23 1107    Specimen: Blood Updated: 11/16/23 1156     Procalcitonin 0.10 ng/mL     Narrative:      As a Marker for Sepsis (Non-Neonates):    1. <0.5 ng/mL represents a low risk of severe sepsis and/or septic shock.  2. >2 ng/mL represents a high risk of severe sepsis and/or septic shock.    As a Marker for Lower Respiratory Tract Infections that require antibiotic therapy:    PCT on Admission    Antibiotic Therapy       6-12 Hrs later    >0.5                Strongly Recommended  >0.25 - <0.5        Recommended   0.1 - 0.25          Discouraged              Remeasure/reassess PCT  <0.1                Strongly Discouraged     Remeasure/reassess PCT    As 28 day mortality risk marker: \"Change in Procalcitonin Result\" (>80% or <=80%) if Day 0 (or Day 1) and Day 4 values are available. Refer to http://www.[employer]-pct-calculator.com    Change in PCT <=80%  A decrease of PCT levels below or equal to 80% defines a positive change in PCT test result representing a higher risk for 28-day all-cause mortality of patients diagnosed with severe sepsis for septic shock.    Change in PCT >80%  A decrease of PCT levels of more than 80% defines a negative change in PCT result representing a lower risk for 28-day all-cause mortality of patients diagnosed with severe sepsis or septic shock.       T4, Free [[id number]]  (Normal) " Collected: 11/16/23 1107    Specimen: Blood Updated: 11/16/23 1156     Free T4 1.08 ng/dL     Narrative:      Results may be falsely increased if patient taking Biotin.      Basic Metabolic Panel [[id number]]  (Abnormal) Collected: 11/16/23 1107    Specimen: Blood Updated: 11/16/23 1154     Glucose 124 mg/dL      BUN 26 mg/dL      Creatinine 0.58 mg/dL      Sodium 136 mmol/L      Potassium 4.7 mmol/L      Chloride 97 mmol/L      CO2 32.0 mmol/L      Calcium 9.1 mg/dL      BUN/Creatinine Ratio 44.8     Anion Gap 7.0 mmol/L      eGFR 103.0 mL/min/1.73     Narrative:      GFR Normal >60  Chronic Kidney Disease <60  Kidney Failure <15    The GFR formula is only valid for adults with stable renal function between ages 18 and 70.    C-reactive Protein [[id number]]  (Abnormal) Collected: 11/16/23 1107    Specimen: Blood Updated: 11/16/23 1154     C-Reactive Protein 6.32 mg/dL     Magnesium [[id number]]  (Normal) Collected: 11/16/23 1107    Specimen: Blood Updated: 11/16/23 1151     Magnesium 1.9 mg/dL     BNP [[id number]]  (Abnormal) Collected: 11/16/23 1107    Specimen: Blood Updated: 11/16/23 1150     proBNP 4,048.0 pg/mL     Narrative:      This assay is used as an aid in the diagnosis of individuals suspected of having heart failure. It can be used as an aid in the diagnosis of acute decompensated heart failure (ADHF) in patients presenting with signs and symptoms of ADHF to the emergency department (ED). In addition, NT-proBNP of <300 pg/mL indicates ADHF is not likely.    Age Range Result Interpretation  NT-proBNP Concentration (pg/mL:      <50             Positive            >450                   Gray                 300-450                    Negative             <300    50-75           Positive            >900                  Gray                300-900                  Negative            <300      >75             Positive            >1800                  Gray                300-1800                  Negative       "      <300    Single High Sensitivity Troponin T [[id number]]  (Normal) Collected: 11/16/23 1107    Specimen: Blood Updated: 11/16/23 1150     HS Troponin T 17 ng/L     Narrative:      High Sensitive Troponin T Reference Range:  <14.0 ng/L- Negative Female for AMI  <22.0 ng/L- Negative Male for AMI  >=14 - Abnormal Female indicating possible myocardial injury.  >=22 - Abnormal Male indicating possible myocardial injury.   Clinicians would have to utilize clinical acumen, EKG, Troponin, and serial changes to determine if it is an Acute Myocardial Infarction or myocardial injury due to an underlying chronic condition.         Protime-INR [[id number]]  (Abnormal) Collected: 11/16/23 1107    Specimen: Blood Updated: 11/16/23 1139     Protime 17.7 Seconds      INR 1.44    D-dimer, Quantitative [[id number]]  (Abnormal) Collected: 11/16/23 1107    Specimen: Blood Updated: 11/16/23 1139     D-Dimer, Quantitative 1.02 MCGFEU/mL     Narrative:      According to the assay 's published package insert, a normal (<0.50 MCGFEU/mL) D-dimer result in conjunction with a non-high clinical probability assessment, excludes deep vein thrombosis (DVT) and pulmonary embolism (PE) with high sensitivity.    D-dimer values increase with age and this can make VTE exclusion of an older population difficult. To address this, the [employer], based on best available evidence and recent guidelines, recommends that clinicians use age-adjusted D-dimer thresholds in patients greater than 50 years of age with: a) a low probability of PE who do not meet all Pulmonary Embolism Rule Out Criteria, or b) in those with intermediate probability of PE.   The formula for an age-adjusted D-dimer cut-off is \"age/100\".  For example, a 60 year old patient would have an age-adjusted cut-off of 0.60 MCGFEU/mL and an 80 year old 0.80 MCGFEU/mL.    CBC & Differential [[id number]]  (Abnormal) Collected: 11/16/23 1107    Specimen: Blood " "Updated: 23    Narrative:      The following orders were created for panel order CBC & Differential.  Procedure                               Abnormality         Status                     ---------                               -----------         ------                     CBC Auto Differential[[id number]]        Abnormal            Final result                 Please view results for these tests on the individual orders.    CBC Auto Differential [[id number]]  (Abnormal) Collected: 23 110    Specimen: Blood Updated: 23     WBC 7.30 10*3/mm3      RBC 4.61 10*6/mm3      Hemoglobin 12.8 g/dL      Hematocrit 43.2 %      MCV 93.7 fL      MCH 27.8 pg      MCHC 29.6 g/dL      RDW 15.5 %      RDW-SD 53.3 fl      MPV 9.6 fL      Platelets 453 10*3/mm3      Neutrophil % 75.5 %      Lymphocyte % 13.4 %      Monocyte % 9.7 %      Eosinophil % 0.3 %      Basophil % 0.4 %      Immature Grans % 0.7 %      Neutrophils, Absolute 5.51 10*3/mm3      Lymphocytes, Absolute 0.98 10*3/mm3      Monocytes, Absolute 0.71 10*3/mm3      Eosinophils, Absolute 0.02 10*3/mm3      Basophils, Absolute 0.03 10*3/mm3      Immature Grans, Absolute 0.05 10*3/mm3      nRBC 0.0 /100 WBC               Objective:     Vitals: /60 (BP Location: Left arm, Patient Position: Lying)   Pulse 118   Temp 98.6 °F (37 °C) (Oral)   Resp 18   Ht 170.2 cm (67\")   Wt 84.8 kg (187 lb)   SpO2 93%   BMI 29.29 kg/m²    Intake/Output Summary (Last 24 hours) at 2023 1116  Last data filed at 2023 0947  Gross per 24 hour   Intake 300 ml   Output 1700 ml   Net -1400 ml    Temp (24hrs), Av.7 °F (37.1 °C), Min:98.1 °F (36.7 °C), Max:99.2 °F (37.3 °C)      Physical Exam  Vitals and nursing note reviewed.   Constitutional:       Appearance: Normal appearance.   HENT:      Head: Normocephalic and atraumatic.      Mouth/Throat:      Mouth: Mucous membranes are moist.   Eyes:      Pupils: Pupils are equal, round, and reactive to " light.   Cardiovascular:      Rate and Rhythm: Normal rate and regular rhythm.      Pulses: Normal pulses.      Heart sounds: Normal heart sounds.   Pulmonary:      Effort: Pulmonary effort is normal.   Abdominal:      General: Abdomen is flat. Bowel sounds are normal.   Musculoskeletal:         General: Normal range of motion.   Skin:     General: Skin is warm.      Capillary Refill: Capillary refill takes less than 2 seconds.   Neurological:      General: No focal deficit present.      Mental Status: He is alert.             Assessment and Plan:     Primary Problem:  Atrial fibrillation with rapid ventricular response  Bilateral pneumonia -CAP/POA  COPD acute exacerbation  Acute on Chronic Respirtory Failure  Systolic CHF  Acute alcohol withdrawal  Hospital Problem list:    Atrial fibrillation with rapid ventricular response    Essential hypertension    Chronic respiratory failure with hypoxia, on home O2 therapy    Acute combined systolic and diastolic congestive heart failure    Pericardial effusion      PMH:  Past Medical History:   Diagnosis Date    A-fib     COPD (chronic obstructive pulmonary disease)     Elevated PSA     Hypercholesteremia     Hypertension        Treatment Plan:  PNEUMONIA:  Start/Continue IV antibiotics  Pulmonary toilet (nebulized treatments, Incentive Spirometry, P&PD)  Early Mobilization  Collect sputum culture and blood cultures x2  Supplement 02 as needed to maintain oxygen saturation >92%  DVT prophylaxis with Lovenox and SCDs  Tobacco cessation education provided     ATRIAL FIBRILLATION:  Rate control - utilize cardizem 10mg IV bolus and 5mg/hr as needed to maintain rate <100.  If this fails or BP intolerant, will Add IV amiodarone and/or digoxin  Anticoagulation - utilize Lovenox 1mg/kg subcutaneous every 12 hours if not therapeutic on INR or if not on Xarelto/Pradaxa/Eliquis,etc.  Telemetry monitoring  R/o cardiac ischemia  R/o thyroid disorder  Cardiology consultation  ongoing  COPD:  Start/continue IV steroids and wean as tolerated  Supplemental 02 to maintain oxygen sats approximately 90%, avoiding suppression of respiratory drive in CO2 retainers  Aggressive pulmonary toilet with Duoneb   Early mobilization  Incentive spirometry  DVT prophylaxis  IV antibiotics for evidence of Acute on Chronic Bronchitis  Monitor for respiratory distress  Pulmonary consult as needed       Disposition: home    Reviewed treatment plans with the patient and/or family.   30 minutes spent in face to face interaction and coordination of care.     Electronically signed by Arjun Roman MD on 11/19/2023 at 11:16 CST

## 2023-11-20 LAB
ANION GAP SERPL CALCULATED.3IONS-SCNC: 4 MMOL/L (ref 5–15)
BASOPHILS # BLD AUTO: 0.03 10*3/MM3 (ref 0–0.2)
BASOPHILS NFR BLD AUTO: 0.4 % (ref 0–1.5)
BUN SERPL-MCNC: 14 MG/DL (ref 8–23)
BUN/CREAT SERPL: 16.3 (ref 7–25)
CALCIUM SPEC-SCNC: 8.9 MG/DL (ref 8.6–10.5)
CHLORIDE SERPL-SCNC: 93 MMOL/L (ref 98–107)
CO2 SERPL-SCNC: 41 MMOL/L (ref 22–29)
CREAT SERPL-MCNC: 0.86 MG/DL (ref 0.76–1.27)
DEPRECATED RDW RBC AUTO: 51.8 FL (ref 37–54)
EGFRCR SERPLBLD CKD-EPI 2021: 91.4 ML/MIN/1.73
EOSINOPHIL # BLD AUTO: 0.04 10*3/MM3 (ref 0–0.4)
EOSINOPHIL NFR BLD AUTO: 0.6 % (ref 0.3–6.2)
ERYTHROCYTE [DISTWIDTH] IN BLOOD BY AUTOMATED COUNT: 15.5 % (ref 12.3–15.4)
GLUCOSE SERPL-MCNC: 104 MG/DL (ref 65–99)
HCT VFR BLD AUTO: 40.7 % (ref 37.5–51)
HGB BLD-MCNC: 12.5 G/DL (ref 13–17.7)
IMM GRANULOCYTES # BLD AUTO: 0.03 10*3/MM3 (ref 0–0.05)
IMM GRANULOCYTES NFR BLD AUTO: 0.4 % (ref 0–0.5)
LYMPHOCYTES # BLD AUTO: 1.65 10*3/MM3 (ref 0.7–3.1)
LYMPHOCYTES NFR BLD AUTO: 23.7 % (ref 19.6–45.3)
MCH RBC QN AUTO: 28.1 PG (ref 26.6–33)
MCHC RBC AUTO-ENTMCNC: 30.7 G/DL (ref 31.5–35.7)
MCV RBC AUTO: 91.5 FL (ref 79–97)
MONOCYTES # BLD AUTO: 0.99 10*3/MM3 (ref 0.1–0.9)
MONOCYTES NFR BLD AUTO: 14.2 % (ref 5–12)
NEUTROPHILS NFR BLD AUTO: 4.23 10*3/MM3 (ref 1.7–7)
NEUTROPHILS NFR BLD AUTO: 60.7 % (ref 42.7–76)
NRBC BLD AUTO-RTO: 0 /100 WBC (ref 0–0.2)
NT-PROBNP SERPL-MCNC: 612.4 PG/ML (ref 0–900)
PLATELET # BLD AUTO: 447 10*3/MM3 (ref 140–450)
PMV BLD AUTO: 9.5 FL (ref 6–12)
POTASSIUM SERPL-SCNC: 4 MMOL/L (ref 3.5–5.2)
RBC # BLD AUTO: 4.45 10*6/MM3 (ref 4.14–5.8)
SODIUM SERPL-SCNC: 138 MMOL/L (ref 136–145)
WBC NRBC COR # BLD AUTO: 6.97 10*3/MM3 (ref 3.4–10.8)

## 2023-11-20 PROCEDURE — 97161 PT EVAL LOW COMPLEX 20 MIN: CPT

## 2023-11-20 PROCEDURE — 97110 THERAPEUTIC EXERCISES: CPT

## 2023-11-20 PROCEDURE — 97116 GAIT TRAINING THERAPY: CPT

## 2023-11-20 PROCEDURE — 94761 N-INVAS EAR/PLS OXIMETRY MLT: CPT

## 2023-11-20 PROCEDURE — 25010000002 THIAMINE PER 100 MG: Performed by: FAMILY MEDICINE

## 2023-11-20 PROCEDURE — 99232 SBSQ HOSP IP/OBS MODERATE 35: CPT | Performed by: STUDENT IN AN ORGANIZED HEALTH CARE EDUCATION/TRAINING PROGRAM

## 2023-11-20 PROCEDURE — 25010000002 CEFTRIAXONE PER 250 MG: Performed by: FAMILY MEDICINE

## 2023-11-20 PROCEDURE — 85025 COMPLETE CBC W/AUTO DIFF WBC: CPT | Performed by: FAMILY MEDICINE

## 2023-11-20 PROCEDURE — 94799 UNLISTED PULMONARY SVC/PX: CPT

## 2023-11-20 PROCEDURE — 80048 BASIC METABOLIC PNL TOTAL CA: CPT | Performed by: FAMILY MEDICINE

## 2023-11-20 PROCEDURE — 83880 ASSAY OF NATRIURETIC PEPTIDE: CPT | Performed by: STUDENT IN AN ORGANIZED HEALTH CARE EDUCATION/TRAINING PROGRAM

## 2023-11-20 PROCEDURE — 97530 THERAPEUTIC ACTIVITIES: CPT

## 2023-11-20 RX ORDER — FUROSEMIDE 80 MG
80 TABLET ORAL DAILY
Status: DISCONTINUED | OUTPATIENT
Start: 2023-11-20 | End: 2023-11-21 | Stop reason: HOSPADM

## 2023-11-20 RX ADMIN — DILTIAZEM HYDROCHLORIDE 360 MG: 240 CAPSULE, EXTENDED RELEASE ORAL at 09:13

## 2023-11-20 RX ADMIN — IPRATROPIUM BROMIDE AND ALBUTEROL SULFATE 3 ML: 2.5; .5 SOLUTION RESPIRATORY (INHALATION) at 10:46

## 2023-11-20 RX ADMIN — BUDESONIDE AND FORMOTEROL FUMARATE DIHYDRATE 2 PUFF: 160; 4.5 AEROSOL RESPIRATORY (INHALATION) at 06:25

## 2023-11-20 RX ADMIN — THIAMINE HYDROCHLORIDE 200 MG: 100 INJECTION, SOLUTION INTRAMUSCULAR; INTRAVENOUS at 21:14

## 2023-11-20 RX ADMIN — DOXYCYCLINE 100 MG: 100 INJECTION, POWDER, LYOPHILIZED, FOR SOLUTION INTRAVENOUS at 06:04

## 2023-11-20 RX ADMIN — IPRATROPIUM BROMIDE AND ALBUTEROL SULFATE 3 ML: 2.5; .5 SOLUTION RESPIRATORY (INHALATION) at 20:24

## 2023-11-20 RX ADMIN — BUDESONIDE AND FORMOTEROL FUMARATE DIHYDRATE 2 PUFF: 160; 4.5 AEROSOL RESPIRATORY (INHALATION) at 20:24

## 2023-11-20 RX ADMIN — THIAMINE HYDROCHLORIDE 200 MG: 100 INJECTION, SOLUTION INTRAMUSCULAR; INTRAVENOUS at 14:01

## 2023-11-20 RX ADMIN — GABAPENTIN 600 MG: 300 CAPSULE ORAL at 06:05

## 2023-11-20 RX ADMIN — ROSUVASTATIN CALCIUM 10 MG: 10 TABLET, COATED ORAL at 09:13

## 2023-11-20 RX ADMIN — DILTIAZEM HYDROCHLORIDE 90 MG: 90 TABLET, FILM COATED ORAL at 06:00

## 2023-11-20 RX ADMIN — TEMAZEPAM 15 MG: 15 CAPSULE ORAL at 21:15

## 2023-11-20 RX ADMIN — DOXYCYCLINE 100 MG: 100 INJECTION, POWDER, LYOPHILIZED, FOR SOLUTION INTRAVENOUS at 18:41

## 2023-11-20 RX ADMIN — HYDROCODONE BITARTRATE AND ACETAMINOPHEN 1 TABLET: 10; 325 TABLET ORAL at 21:16

## 2023-11-20 RX ADMIN — RIVAROXABAN 20 MG: 20 TABLET, FILM COATED ORAL at 17:18

## 2023-11-20 RX ADMIN — GABAPENTIN 600 MG: 300 CAPSULE ORAL at 14:01

## 2023-11-20 RX ADMIN — LISINOPRIL 5 MG: 5 TABLET ORAL at 09:13

## 2023-11-20 RX ADMIN — CEFTRIAXONE 1000 MG: 1 INJECTION, POWDER, FOR SOLUTION INTRAMUSCULAR; INTRAVENOUS at 14:06

## 2023-11-20 RX ADMIN — THIAMINE HYDROCHLORIDE 200 MG: 100 INJECTION, SOLUTION INTRAMUSCULAR; INTRAVENOUS at 06:00

## 2023-11-20 RX ADMIN — FOLIC ACID 1 MG: 1 TABLET ORAL at 09:13

## 2023-11-20 RX ADMIN — LORAZEPAM 1 MG: 1 TABLET ORAL at 02:39

## 2023-11-20 RX ADMIN — FUROSEMIDE 80 MG: 80 TABLET ORAL at 09:14

## 2023-11-20 RX ADMIN — DULOXETINE HYDROCHLORIDE 60 MG: 30 CAPSULE, DELAYED RELEASE ORAL at 09:14

## 2023-11-20 RX ADMIN — IPRATROPIUM BROMIDE AND ALBUTEROL SULFATE 3 ML: 2.5; .5 SOLUTION RESPIRATORY (INHALATION) at 14:29

## 2023-11-20 RX ADMIN — PANTOPRAZOLE SODIUM 40 MG: 40 TABLET, DELAYED RELEASE ORAL at 06:04

## 2023-11-20 RX ADMIN — FENOFIBRATE 145 MG: 145 TABLET ORAL at 09:13

## 2023-11-20 RX ADMIN — DIGOXIN 125 MCG: 125 TABLET ORAL at 11:26

## 2023-11-20 RX ADMIN — IPRATROPIUM BROMIDE AND ALBUTEROL SULFATE 3 ML: 2.5; .5 SOLUTION RESPIRATORY (INHALATION) at 06:25

## 2023-11-20 RX ADMIN — GABAPENTIN 600 MG: 300 CAPSULE ORAL at 21:15

## 2023-11-20 RX ADMIN — TAMSULOSIN HYDROCHLORIDE 0.4 MG: 0.4 CAPSULE ORAL at 09:14

## 2023-11-20 NOTE — THERAPY EVALUATION
Patient Name: Burt Mcdonald  : 1950    MRN: [id number]                              Today's Date: 2023       Admit Date: 2023    Visit Dx:     ICD-10-CM ICD-9-CM   1. Atrial fibrillation with rapid ventricular response  I48.91 427.31   2. Community acquired pneumonia, unspecified laterality  J18.9 486   3. Pericardial effusion  I31.39 423.9   4. Impaired mobility [Z74.09]  Z74.09 799.89     Patient Active Problem List   Diagnosis    Essential hypertension    Chronic pain    Elevated PSA    Overweight (BMI 25.0-29.9)    Tobacco abuse    Chronic bilateral low back pain without sciatica    Lumbar radiculopathy    Spinal stenosis, lumbar region, with neurogenic claudication    Longstanding persistent atrial fibrillation    Current use of long term anticoagulation    Mixed hyperlipidemia    Chronic respiratory failure with hypoxia, on home O2 therapy    Atrial fibrillation with rapid ventricular response    Acute combined systolic and diastolic congestive heart failure    Pericardial effusion     Past Medical History:   Diagnosis Date    A-fib     COPD (chronic obstructive pulmonary disease)     Elevated PSA     Hypercholesteremia     Hypertension      Past Surgical History:   Procedure Laterality Date    BACK SURGERY      EYE SURGERY  2016    Bilateral cataract      General Information       Row Name 23 1004          Physical Therapy Time and Intention    Document Type evaluation  short of breath, weakness, falls, BLE edema, Dx:  afib w/ rvr, B pneumonia  -     Mode of Treatment physical therapy  -       Row Name 23 1004          General Information    Patient Profile Reviewed yes  -LH     Prior Level of Function independent:;community mobility;ADL's  -     Existing Precautions/Restrictions fall;oxygen therapy device and L/min  -     Barriers to Rehab previous functional deficit  -       Row Name 23 1004          Living Environment    People in Home significant  other  -[facility] Name 11/20/23 1004          Home Main Entrance    Number of Stairs, Main Entrance none  -[facility] Name 11/20/23 1004          Stairs Within Home, Primary    Number of Stairs, Within Home, Primary none  -[facility] Name 11/20/23 1004          Cognition    Orientation Status (Cognition) oriented x 4  -[facility] Name 11/20/23 1004          Safety Issues, Functional Mobility    Safety Issues Affecting Function (Mobility) ability to follow commands  -     Impairments Affecting Function (Mobility) balance;endurance/activity tolerance;postural/trunk control;shortness of breath;strength  -               User Key  (r) = Recorded By, (t) = Taken By, (c) = Cosigned By      Initials Name Provider Type     Miguel Bonilla, PT Physical Therapist                   Mobility       [facility] Name 11/20/23 1004          Bed Mobility    Bed Mobility supine-sit  -     Supine-Sit [place] (Bed Mobility) modified independence  -     Assistive Device (Bed Mobility) head of bed elevated  -[facility] Name 11/20/23 1004          Sit-Stand Transfer    Sit-Stand [place] (Transfers) minimum assist (75% patient effort)  -[facility] Name 11/20/23 1004          Gait/Stairs (Locomotion)    [place] Level (Gait) contact guard  -     Assistive Device (Gait) walker, front-wheeled  -     Distance in Feet (Gait) 50  -     Deviations/Abnormal Patterns (Gait) gait speed decreased;kimberley decreased;stride length decreased  -     Bilateral Gait Deviations forward flexed posture  -               User Key  (r) = Recorded By, (t) = Taken By, (c) = Cosigned By      Initials Name Provider Type     Miguel Bonilla, PT Physical Therapist                   Obj/Interventions       [facility] Name 11/20/23 1004          Range of Motion Comprehensive    General Range of Motion bilateral upper extremity ROM WFL;bilateral lower extremity ROM WFL  -[facility] Name 11/20/23 1004          Strength Comprehensive (MMT)     General Manual Muscle Testing (MMT) Assessment upper extremity strength deficits identified;lower extremity strength deficits identified  -               User Key  (r) = Recorded By, (t) = Taken By, (c) = Cosigned By      Initials Name Provider Type     Miguel Bonilla, PT Physical Therapist                   Goals/Plan       Row Name 11/20/23 1004          Bed Mobility Goal 1 (PT)    Activity/Assistive Device (Bed Mobility Goal 1, PT) bed mobility activities, all  -     [place] Level/Cues Needed (Bed Mobility Goal 1, PT) independent  -     Time Frame (Bed Mobility Goal 1, PT) 10 days  -     Progress/Outcomes (Bed Mobility Goal 1, PT) new goal  -       Row Name 11/20/23 1004          Transfer Goal 1 (PT)    Activity/Assistive Device (Transfer Goal 1, PT) sit-to-stand/stand-to-sit  -     [place] Level/Cues Needed (Transfer Goal 1, PT) independent  -     Time Frame (Transfer Goal 1, PT) 10 days  -     Progress/Outcome (Transfer Goal 1, PT) new goal  -       Row Name 11/20/23 1004          Gait Training Goal 1 (PT)    Activity/Assistive Device (Gait Training Goal 1, PT) gait (walking locomotion);decrease fall risk  -     [place] Level (Gait Training Goal 1, PT) independent  -     Distance (Gait Training Goal 1, PT) 150ft  -     Time Frame (Gait Training Goal 1, PT) 10 days  -     Progress/Outcome (Gait Training Goal 1, PT) new [facility]  -       Row Name 11/20/23 1004          Therapy Assessment/Plan (PT)    Planned Therapy Interventions (PT) balance training;bed mobility training;gait training;home exercise program;strengthening;postural re-education;patient/family education;transfer training  -               User Key  (r) = Recorded By, (t) = Taken By, (c) = Cosigned By      Initials Name Provider Type     Miguel Bonilla, PT Physical Therapist                   Clinical Impression       Row Name 11/20/23 1004          Pain    Pretreatment Pain Rating 0/10 - no pain  -      Posttreatment Pain Rating 0/10 - no pain  -     Pain Intervention(s) Medication (See MAR)  -       Row Name 11/20/23 1004          Plan of Care Review    Plan of Care Reviewed With patient  -     Outcome Evaluation PT IE complete.  A&Ox4.  No c/o pain.  Pt reports independent PLOF w/o assistive device.  Today he is modified independent bed mobility (HOB elevated).  Min A sit<>stand.  Ambulated 50ft CGA x1 w/ RW on 3L O2.  Posterior lean/loses balance backwards w/o RW.  O2sat 92%.  PT to see for gait safety and strengthening.  Wean from RW as able.  Recommend HH at [facility].  Thank you for referral.  -       Row Name 11/20/23 1004          Therapy Assessment/Plan (PT)    Patient/Family Therapy Goals Statement (PT) return home  -     Rehab Potential (PT) good, to achieve stated therapy goals  -     Criteria for Skilled Interventions Met (PT) yes;skilled treatment is necessary  -     Therapy Frequency (PT) 2 times/day  -     Predicted Duration of Therapy Intervention (PT) until dc  -       Row Name 11/20/23 1004          Positioning and Restraints    Pre-Treatment Position in bed  -     Post Treatment Position chair  -     In Chair sitting;call light within reach;encouraged to call for assist;exit alarm on;legs elevated  -               User Key  (r) = Recorded By, (t) = Taken By, (c) = Cosigned By      Initials Name Provider Type     Miguel Bonilla, PT Physical Therapist                   Outcome Measures       Row Name 11/20/23 1004 11/20/23 0720       How much help from another person do you currently need...    Turning from your back to your side while in flat bed without using bedrails? 4  - 4  -AG    Moving from lying on back to sitting on the side of a flat bed without bedrails? 3  - 4  -AG    Moving to and from a bed to a chair (including a wheelchair)? 3  - 3  -AG    Standing up from a chair using your arms (e.g., wheelchair, bedside chair)? 3  - 3  -AG    Climbing 3-5 steps with a  railing? 2  - 3  -AG    To walk in hospital room? 3  - 3  -AG    AM-PAC 6 Clicks Score (PT) 18  - 20  -AG    Highest Level of Mobility Goal 6 --> Walk 10 steps or more  - 6 --> Walk 10 steps or more  -      Row Name 11/20/23 1004          Functional Assessment    Outcome Measure Options AM-PAC 6 Clicks Basic Mobility (PT)  -               User Key  (r) = Recorded By, (t) = Taken By, (c) = Cosigned By      Initials Name Provider Type     Miguel Bonilla, PT Physical Therapist    Haley Brenner, RN Registered Nurse                                 Physical Therapy Education       Title: PT OT SLP Therapies (Done)       Topic: Physical Therapy (Done)       Point: Mobility training (Done)       Learning Progress Summary             Patient Acceptance, E,D, DU,VU by  at 11/20/2023 1107    Comment: benefits of PT and POC, call for A OOB                         Point: Precautions (Done)       Learning Progress Summary             Patient Acceptance, E,D, DU,VU by  at 11/20/2023 1107    Comment: benefits of PT and POC, call for A OOB                                         User Key       Initials Effective Dates Name Provider Type Discipline     02/03/23 -  Miguel Bonilla, PT Physical Therapist PT                  PT Recommendation and Plan  Planned Therapy Interventions (PT): balance training, bed mobility training, gait training, home exercise program, strengthening, postural re-education, patient/family education, transfer training  Plan of Care Reviewed With: patient  Outcome Evaluation: PT IE complete.  A&Ox4.  No c/o pain.  Pt reports independent PLOF w/o assistive device.  Today he is modified independent bed mobility (HOB elevated).  Min A sit<>stand.  Ambulated 50ft CGA x1 w/ RW on 3L O2.  Posterior lean/loses balance backwards w/o RW.  O2sat 92%.  PT to see for gait safety and strengthening.  Wean from RW as able.  Recommend HH at [facility].  Thank you for referral.     Time Calculation:         PT  Charges       Row Name 11/20/23 1108             Time Calculation    Start Time 1004  -      Stop Time 1100  -      Time Calculation (min) 56 min  -      PT Received On 11/20/23  -      PT Goal Re-Cert Due Date 11/30/23  -         Time Calculation- PT    Total Timed Code Minutes- PT 16 minute(s)  -         Timed Charges    13611 - Gait Training Minutes  16  -LH         Untimed Charges    PT Eval/Re-eval Minutes 40  -LH         Total Minutes    Timed Charges Total Minutes 16  -LH      Untimed Charges Total Minutes 40  -LH       Total Minutes 56  -LH                User Key  (r) = Recorded By, (t) = Taken By, (c) = Cosigned By      Initials Name Provider Type     Miguel Bonilla, PT Physical Therapist                  Therapy Charges for Today       Code Description Service Date Service Provider Modifiers Qty    15178491079 HC GAIT TRAINING EA 15 MIN 11/20/2023 Miguel Bonilla, PT GP 1    50268847064 HC PT EVAL LOW COMPLEXITY 3 11/20/2023 Miguel Bonilla, PT GP 1            PT G-Codes  Outcome Measure Options: AM-PAC 6 Clicks Basic Mobility (PT)  AM-PAC 6 Clicks Score (PT): 18  PT Discharge Summary  Anticipated Discharge Disposition (PT): home with home health    Miguel Bonilla, GODWIN  11/20/2023

## 2023-11-20 NOTE — PLAN OF CARE
Goal Outcome Evaluation:  Plan of Care Reviewed With: patient           Outcome Evaluation: Pt tolerating po cardizem and digoxin, HR improved today AF  with couplets, walked in becker with PT, sat in chair for few hrs, no on po lasix, voids frequently, SCDs in place, has been pleasant and cooperative,  bed alarm in use, safety maintained

## 2023-11-20 NOTE — THERAPY TREATMENT NOTE
Acute Care - Physical Therapy Treatment Note  [facility]     Patient Name: Burt Mcdonald  : 1950  MRN: [id number]  Today's Date: 2023      Visit Dx:     ICD-10-CM ICD-9-CM   1. Atrial fibrillation with rapid ventricular response  I48.91 427.31   2. Community acquired pneumonia, unspecified laterality  J18.9 486   3. Pericardial effusion  I31.39 423.9   4. Impaired mobility [Z74.09]  Z74.09 799.89     Patient Active Problem List   Diagnosis    Essential hypertension    Chronic pain    Elevated PSA    Overweight (BMI 25.0-29.9)    Tobacco abuse    Chronic bilateral low back pain without sciatica    Lumbar radiculopathy    Spinal stenosis, lumbar region, with neurogenic claudication    Longstanding persistent atrial fibrillation    Current use of long term anticoagulation    Mixed hyperlipidemia    Chronic respiratory failure with hypoxia, on home O2 therapy    Atrial fibrillation with rapid ventricular response    Acute combined systolic and diastolic congestive heart failure    Pericardial effusion     Past Medical History:   Diagnosis Date    A-fib     COPD (chronic obstructive pulmonary disease)     Elevated PSA     Hypercholesteremia     Hypertension      Past Surgical History:   Procedure Laterality Date    BACK SURGERY      EYE SURGERY  2016    Bilateral cataract     PT Assessment (last 12 hours)       PT Evaluation and Treatment       Row Name 23 1339 23 1316       Physical Therapy Time and Intention    Subjective Information complains of;fatigue;pain  -MF --    Document Type therapy note (daily note)  - therapy note (daily note)  -    Mode of Treatment physical therapy  - --    Session Not Performed -- other (see comments)  -    Comment, Session Not Performed -- pt sleeping soundly. Will check back.  -      Row Name 23 1304          Physical Therapy Time and Intention    Document Type --  -       Row Name 23 2510          General Information    Existing  Precautions/Restrictions fall;oxygen therapy device and L/min  -       Row Name 11/20/23 1339          Pain    Pain Intervention(s) Repositioned;Ambulation/increased activity  -       Row Name 11/20/23 1339          Pain Scale: Word Pre/Post-Treatment    Pain: Word Scale, Pretreatment 4 - moderate pain  -     Posttreatment Pain Rating 4 - moderate pain  -     Pain Location - Side/Orientation Left  -     Pain Location - flank  -       Row Name 11/20/23 1339          Sit-Stand Transfer    Sit-Stand [place] (Transfers) verbal cues;contact guard;minimum assist (75% patient effort)  -       Row Name 11/20/23 1339          Stand-Sit Transfer    Stand-Sit [place] (Transfers) verbal cues;contact guard  -       Row Name 11/20/23 1339          Toilet Transfer    Type (Toilet Transfer) sit-stand;stand-sit  -     [place] Level (Toilet Transfer) verbal cues;contact guard  -     Assistive Device (Toilet Transfer) commode  -     Comment, (Toilet Transfer) pt performed his hygiene himself  -       Row Name 11/20/23 1339          Gait/Stairs (Locomotion)    [place] Level (Gait) verbal cues;contact guard;minimum assist (75% patient effort)  -     Assistive Device (Gait) walker, front-wheeled  -     Distance in Feet (Gait) 30',30' standing rest  -     Deviations/Abnormal Patterns (Gait) antalgic;kimberley decreased;stride length decreased  -     Bilateral Gait Deviations forward flexed posture;heel strike decreased  -     Comment, (Gait/Stairs) Pt. began to need MIN assist after walking the first 30', he began to have increased pain in back and weakness in his legs.  -       Row Name 11/20/23 1339          Hip (Therapeutic Exercise)    Hip (Therapeutic Exercise) AROM (active range of motion)  -     Hip AROM (Therapeutic Exercise) bilateral;flexion;aBduction;aDduction;sitting;15 repititions  -       Row Name 11/20/23 1339          Knee (Therapeutic Exercise)    Knee (Therapeutic  Exercise) AROM (active range of motion)  -     Knee AROM (Therapeutic Exercise) bilateral;LAQ (long arc quad);sitting;15 repititions  -       Row Name 11/20/23 1339          Ankle (Therapeutic Exercise)    Ankle (Therapeutic Exercise) AROM (active range of motion)  -     Ankle AROM (Therapeutic Exercise) bilateral;dorsiflexion;sitting;20 repititions  -       Row Name             Wound Right distal leg Other (comment)    Wound - Properties Group Side: Right  -AJ Orientation: distal  -AJ Location: leg  -AJ Primary Wound Type: Other  -AJ, unspecified skin growth     Retired Wound - Properties Group Side: Right  -AJ Orientation: distal  -AJ Location: leg  -AJ Primary Wound Type: Other  -AJ, unspecified skin growth     Retired Wound - Properties Group Side: Right  -AJ Location: leg  -AJ Primary Wound Type: Other  -AJ, unspecified skin growth       Row Name 11/20/23 1339          Vital Signs    Pre SpO2 (%) 95  -MF     O2 Delivery Pre Treatment supplemental O2  2l  -MF     O2 Delivery Intra Treatment supplemental O2  2l  -MF     Post SpO2 (%) 95  -MF     O2 Delivery Post Treatment supplemental O2  2l  -MF       Row Name 11/20/23 1339          Positioning and Restraints    Pre-Treatment Position sitting in chair/recliner  -MF     Post Treatment Position bed  -MF     In Bed fowlers;call light within reach;encouraged to call for assist;exit alarm on;side rails up x2  -MF               User Key  (r) = Recorded By, (t) = Taken By, (c) = Cosigned By      Initials Name Provider Type    Lisbet Lee PTA Physical Therapist Assistant    Tennille Dennison, RN Registered Nurse                    Physical Therapy Education       Title: PT OT SLP Therapies (Done)       Topic: Physical Therapy (Done)       Point: Mobility training (Done)       Learning Progress Summary             Patient Acceptance, E,D, DU,VU by  at 11/20/2023 1107    Comment: benefits of PT and POC, call for A OOB                         Point:  Precautions (Done)       Learning Progress Summary             Patient Acceptance, E,D, DU,VU by  at 11/20/2023 1107    Comment: benefits of PT and POC, call for A OOB                                         User Key       Initials Effective Dates Name Provider Type Discipline     02/03/23 -  Miguel Bonilla, PT Physical Therapist PT                  PT Recommendation and Plan             Time Calculation:    PT Charges       Row Name 11/20/23 1420 11/20/23 1108          Time Calculation    Start Time 1339  - 1004  -     Stop Time 1420  - 1100  -     Time Calculation (min) 41 min  - 56 min  -     PT Received On -- 11/20/23  -     PT Goal Re-Cert Due Date -- 11/30/23  -        Time Calculation- PT    Total Timed Code Minutes- PT 41 minute(s)  - 16 minute(s)  -        Timed Charges    22257 - PT Therapeutic Exercise Minutes 15  -MF --     67909 - Gait Training Minutes  15  - 16  -     70691 - PT Therapeutic Activity Minutes 11  -MF --        Untimed Charges    PT Eval/Re-eval Minutes -- 40  -        Total Minutes    Timed Charges Total Minutes 41  -MF 16  -     Untimed Charges Total Minutes -- 40  -      Total Minutes 41  -MF 56  -               User Key  (r) = Recorded By, (t) = Taken By, (c) = Cosigned By      Initials Name Provider Type     Miguel Bonilla, PT Physical Therapist     Lisbet Baker, PTA Physical Therapist Assistant                  Therapy Charges for Today       Code Description Service Date Service Provider Modifiers Qty    54387981251 HC PT THER PROC EA 15 MIN 11/20/2023 Lisbet Baker PTA GP 1    80625588667 HC GAIT TRAINING EA 15 MIN 11/20/2023 Lisbet Baker PTA GP 1    24664425642 HC PT THERAPEUTIC ACT EA 15 MIN 11/20/2023 Lisbet Baker, PHANI GP 1            PT G-Codes  Outcome Measure Options: AM-PAC 6 Clicks Basic Mobility (PT)  AM-PAC 6 Clicks Score (PT): 18    Lisbet Baker PTA  11/20/2023

## 2023-11-20 NOTE — PROGRESS NOTES
Daily Progress Note  Burt Mcdonald  MRN: [id number] LOS: 4    Admit Date: 11/16/2023 11/20/2023 08:37 CST    Subjective:      Chief Complaint:  Chief Complaint   Patient presents with    Atrial Fibrillation    Cough    Shortness of Breath       Interval History:    Reviewed overnight events and nursing notes.   The patient is resting comfortably this morning. Less shaky and sob is improving.    Review of Systems   Constitutional:  Positive for activity change and fatigue.   HENT:  Positive for congestion.    Respiratory:  Positive for cough and shortness of breath.    Cardiovascular: Negative.    Gastrointestinal: Negative.    Genitourinary: Negative.    Neurological:  Positive for tremors and weakness.       DIET:  Diet: Cardiac Diets; Healthy Heart (2-3 Na+); Texture: Regular Texture (IDDSI 7); Fluid Consistency: Thin (IDDSI 0)    Medications:        budesonide-formoterol, 2 puff, Inhalation, BID - RT  cefTRIAXone, 1,000 mg, Intravenous, Q24H  digoxin, 125 mcg, Oral, Daily  dilTIAZem CD, 360 mg, Oral, Q24H  doxycycline, 100 mg, Intravenous, Q12H  DULoxetine, 60 mg, Oral, Daily  fenofibrate, 145 mg, Oral, Daily  folic acid, 1 mg, Oral, Daily  furosemide, 80 mg, Oral, Daily  gabapentin, 600 mg, Oral, Q8H  ipratropium-albuterol, 3 mL, Nebulization, 4x Daily - RT  lisinopril, 5 mg, Oral, Q24H  pantoprazole, 40 mg, Oral, Q AM  rivaroxaban, 20 mg, Oral, Daily With Dinner  rosuvastatin, 10 mg, Oral, Daily  tamsulosin, 0.4 mg, Oral, Daily  thiamine (B-1) IV, 200 mg, Intravenous, Q8H   Followed by  [START ON 11/23/2023] thiamine, 100 mg, Oral, Daily        Data:     Code Status:   Code Status and Medical Interventions:   Ordered at: 11/16/23 1812     Level Of Support Discussed With:    Patient     Code Status (Patient has no pulse and is not breathing):    CPR (Attempt to Resuscitate)     Medical Interventions (Patient has pulse or is breathing):    Full Support       Family History   Problem Relation Age of Onset     Cancer Father         LUNG    Cancer Mother         LUNG    No Known Problems Sister     No Known Problems Brother     No Known Problems Brother     No Known Problems Brother      Social History     Socioeconomic History    Marital status: Single   Tobacco Use    Smoking status: Every Day     Packs/day: 1     Types: Cigarettes    Smokeless tobacco: Current     Types: Chew   Vaping Use    Vaping Use: Never used   Substance and Sexual Activity    Alcohol use: Yes     Alcohol/week: 3.0 standard drinks of alcohol     Types: 2 Cans of beer, 1 Shots of liquor per week     Comment: daily    Drug use: No    Sexual activity: Defer       Labs:  Lab Results (last 72 hours)       Procedure Component Value Units Date/Time    BNP [[id number]]  (Normal) Collected: 11/20/23 0521    Specimen: Blood Updated: 11/20/23 0709     proBNP 612.4 pg/mL     Narrative:      This assay is used as an aid in the diagnosis of individuals suspected of having heart failure. It can be used as an aid in the diagnosis of acute decompensated heart failure (ADHF) in patients presenting with signs and symptoms of ADHF to the emergency department (ED). In addition, NT-proBNP of <300 pg/mL indicates ADHF is not likely.    Age Range Result Interpretation  NT-proBNP Concentration (pg/mL:      <50             Positive            >450                   Gray                 300-450                    Negative             <300    50-75           Positive            >900                  Gray                300-900                  Negative            <300      >75             Positive            >1800                  Gray                300-1800                  Negative            <300    Basic Metabolic Panel [[id number]]  (Abnormal) Collected: 11/20/23 0521    Specimen: Blood Updated: 11/20/23 0646     Glucose 104 mg/dL      BUN 14 mg/dL      Creatinine 0.86 mg/dL      Sodium 138 mmol/L      Potassium 4.0 mmol/L      Chloride 93 mmol/L      CO2 41.0 mmol/L       Calcium 8.9 mg/dL      BUN/Creatinine Ratio 16.3     Anion Gap 4.0 mmol/L      eGFR 91.4 mL/min/1.73     Narrative:      GFR Normal >60  Chronic Kidney Disease <60  Kidney Failure <15    The GFR formula is only valid for adults with stable renal function between ages 18 and 70.    CBC & Differential [[id number]]  (Abnormal) Collected: 11/20/23 0521    Specimen: Blood Updated: 11/20/23 0616    Narrative:      The following orders were created for panel order CBC & Differential.  Procedure                               Abnormality         Status                     ---------                               -----------         ------                     CBC Auto Differential[[id number]]        Abnormal            Final result                 Please view results for these tests on the individual orders.    CBC Auto Differential [[id number]]  (Abnormal) Collected: 11/20/23 0521    Specimen: Blood Updated: 11/20/23 0616     WBC 6.97 10*3/mm3      RBC 4.45 10*6/mm3      Hemoglobin 12.5 g/dL      Hematocrit 40.7 %      MCV 91.5 fL      MCH 28.1 pg      MCHC 30.7 g/dL      RDW 15.5 %      RDW-SD 51.8 fl      MPV 9.5 fL      Platelets 447 10*3/mm3      Neutrophil % 60.7 %      Lymphocyte % 23.7 %      Monocyte % 14.2 %      Eosinophil % 0.6 %      Basophil % 0.4 %      Immature Grans % 0.4 %      Neutrophils, Absolute 4.23 10*3/mm3      Lymphocytes, Absolute 1.65 10*3/mm3      Monocytes, Absolute 0.99 10*3/mm3      Eosinophils, Absolute 0.04 10*3/mm3      Basophils, Absolute 0.03 10*3/mm3      Immature Grans, Absolute 0.03 10*3/mm3      nRBC 0.0 /100 WBC     Blood Culture - Blood, Arm, Right [[id number]]  (Normal) Collected: 11/16/23 1505    Specimen: Blood from Arm, Right Updated: 11/19/23 1532     Blood Culture No growth at 3 days    Blood Culture - Blood, Arm, Right [[id number]]  (Normal) Collected: 11/16/23 1450    Specimen: Blood from Arm, Right Updated: 11/19/23 1532     Blood Culture No growth at 3 days    Basic Metabolic  Panel [[id number]]  (Abnormal) Collected: 11/19/23 0423    Specimen: Blood Updated: 11/19/23 0528     Glucose 112 mg/dL      BUN 16 mg/dL      Creatinine 0.87 mg/dL      Sodium 139 mmol/L      Potassium 4.0 mmol/L      Chloride 96 mmol/L      CO2 39.0 mmol/L      Calcium 8.3 mg/dL      BUN/Creatinine Ratio 18.4     Anion Gap 4.0 mmol/L      eGFR 91.1 mL/min/1.73     Narrative:      GFR Normal >60  Chronic Kidney Disease <60  Kidney Failure <15    The GFR formula is only valid for adults with stable renal function between ages 18 and 70.    CBC & Differential [[id number]]  (Abnormal) Collected: 11/19/23 0423    Specimen: Blood Updated: 11/19/23 0507    Narrative:      The following orders were created for panel order CBC & Differential.  Procedure                               Abnormality         Status                     ---------                               -----------         ------                     CBC Auto Differential[[id number]]        Abnormal            Final result                 Please view results for these tests on the individual orders.    CBC Auto Differential [[id number]]  (Abnormal) Collected: 11/19/23 0423    Specimen: Blood Updated: 11/19/23 0507     WBC 7.13 10*3/mm3      RBC 3.87 10*6/mm3      Hemoglobin 10.7 g/dL      Hematocrit 35.7 %      MCV 92.2 fL      MCH 27.6 pg      MCHC 30.0 g/dL      RDW 15.5 %      RDW-SD 52.1 fl      MPV 9.5 fL      Platelets 452 10*3/mm3      Neutrophil % 61.2 %      Lymphocyte % 22.4 %      Monocyte % 14.9 %      Eosinophil % 0.4 %      Basophil % 0.4 %      Immature Grans % 0.7 %      Neutrophils, Absolute 4.36 10*3/mm3      Lymphocytes, Absolute 1.60 10*3/mm3      Monocytes, Absolute 1.06 10*3/mm3      Eosinophils, Absolute 0.03 10*3/mm3      Basophils, Absolute 0.03 10*3/mm3      Immature Grans, Absolute 0.05 10*3/mm3      nRBC 0.0 /100 WBC     High Sensitivity Troponin T 2Hr [[id number]]  (Normal) Collected: 11/18/23 0608    Specimen: Blood Updated: 11/18/23  0639     HS Troponin T 20 ng/L      Troponin T Delta -1 ng/L     Narrative:      High Sensitive Troponin T Reference Range:  <14.0 ng/L- Negative Female for AMI  <22.0 ng/L- Negative Male for AMI  >=14 - Abnormal Female indicating possible myocardial injury.  >=22 - Abnormal Male indicating possible myocardial injury.   Clinicians would have to utilize clinical acumen, EKG, Troponin, and serial changes to determine if it is an Acute Myocardial Infarction or myocardial injury due to an underlying chronic condition.         Comprehensive Metabolic Panel [[id number]]  (Abnormal) Collected: 11/18/23 0435    Specimen: Blood Updated: 11/18/23 0534     Glucose 125 mg/dL      BUN 14 mg/dL      Creatinine 0.73 mg/dL      Sodium 141 mmol/L      Potassium 3.7 mmol/L      Chloride 98 mmol/L      CO2 37.0 mmol/L      Calcium 8.5 mg/dL      Total Protein 5.7 g/dL      Albumin 2.7 g/dL      ALT (SGPT) 13 U/L      AST (SGOT) 23 U/L      Alkaline Phosphatase 68 U/L      Total Bilirubin 0.4 mg/dL      Globulin 3.0 gm/dL      A/G Ratio 0.9 g/dL      BUN/Creatinine Ratio 19.2     Anion Gap 6.0 mmol/L      eGFR 96.1 mL/min/1.73     Narrative:      GFR Normal >60  Chronic Kidney Disease <60  Kidney Failure <15    The GFR formula is only valid for adults with stable renal function between ages 18 and 70.    High Sensitivity Troponin T [[id number]]  (Normal) Collected: 11/18/23 0435    Specimen: Blood Updated: 11/18/23 0534     HS Troponin T 21 ng/L     Narrative:      High Sensitive Troponin T Reference Range:  <14.0 ng/L- Negative Female for AMI  <22.0 ng/L- Negative Male for AMI  >=14 - Abnormal Female indicating possible myocardial injury.  >=22 - Abnormal Male indicating possible myocardial injury.   Clinicians would have to utilize clinical acumen, EKG, Troponin, and serial changes to determine if it is an Acute Myocardial Infarction or myocardial injury due to an underlying chronic condition.         CBC & Differential [[id number]]   (Abnormal) Collected: 11/18/23 0435    Specimen: Blood Updated: 11/18/23 0507    Narrative:      The following orders were created for panel order CBC & Differential.  Procedure                               Abnormality         Status                     ---------                               -----------         ------                     CBC Auto Differential[[id number]]        Abnormal            Final result                 Please view results for these tests on the individual orders.    CBC Auto Differential [[id number]]  (Abnormal) Collected: 11/18/23 0435    Specimen: Blood Updated: 11/18/23 0507     WBC 5.70 10*3/mm3      RBC 3.92 10*6/mm3      Hemoglobin 10.9 g/dL      Hematocrit 36.3 %      MCV 92.6 fL      MCH 27.8 pg      MCHC 30.0 g/dL      RDW 15.4 %      RDW-SD 52.6 fl      MPV 9.4 fL      Platelets 440 10*3/mm3      Neutrophil % 54.0 %      Lymphocyte % 26.8 %      Monocyte % 17.4 %      Eosinophil % 0.7 %      Basophil % 0.4 %      Immature Grans % 0.7 %      Neutrophils, Absolute 3.08 10*3/mm3      Lymphocytes, Absolute 1.53 10*3/mm3      Monocytes, Absolute 0.99 10*3/mm3      Eosinophils, Absolute 0.04 10*3/mm3      Basophils, Absolute 0.02 10*3/mm3      Immature Grans, Absolute 0.04 10*3/mm3      nRBC 0.0 /100 WBC     BNP [[id number]]  (Abnormal) Collected: 11/17/23 0447    Specimen: Blood Updated: 11/17/23 1912     proBNP 2,561.0 pg/mL     Narrative:      This assay is used as an aid in the diagnosis of individuals suspected of having heart failure. It can be used as an aid in the diagnosis of acute decompensated heart failure (ADHF) in patients presenting with signs and symptoms of ADHF to the emergency department (ED). In addition, NT-proBNP of <300 pg/mL indicates ADHF is not likely.    Age Range Result Interpretation  NT-proBNP Concentration (pg/mL:      <50             Positive            >450                   Gray                 300-450                    Negative             <300    50-75   "         Positive            >900                  Gray                300-900                  Negative            <300      >75             Positive            >1800                  Gray                300-1800                  Negative            <300                Objective:     Vitals: /57 (BP Location: Left arm, Patient Position: Lying)   Pulse 89   Temp 98.2 °F (36.8 °C) (Oral)   Resp 16   Ht 170.2 cm (67\")   Wt 77.1 kg (170 lb)   SpO2 95%   BMI 26.63 kg/m²    Intake/Output Summary (Last 24 hours) at 2023 0837  Last data filed at 2023 0729  Gross per 24 hour   Intake 60 ml   Output 1900 ml   Net -1840 ml    Temp (24hrs), Av.1 °F (36.7 °C), Min:97.4 °F (36.3 °C), Max:98.4 °F (36.9 °C)      Physical Exam  Vitals and nursing note reviewed.   Constitutional:       Appearance: Normal appearance.   HENT:      Head: Normocephalic and atraumatic.      Mouth/Throat:      Mouth: Mucous membranes are moist.   Eyes:      Pupils: Pupils are equal, round, and reactive to light.   Cardiovascular:      Rate and Rhythm: Normal rate and regular rhythm.      Pulses: Normal pulses.      Heart sounds: Normal heart sounds.   Pulmonary:      Effort: Pulmonary effort is normal.   Abdominal:      General: Abdomen is flat. Bowel sounds are normal.   Musculoskeletal:         General: Normal range of motion.   Skin:     General: Skin is warm.      Capillary Refill: Capillary refill takes less than 2 seconds.   Neurological:      General: No focal deficit present.      Mental Status: He is alert.             Assessment and Plan:     Primary Problem:  Atrial fibrillation with rapid ventricular response  Bilateral pneumonia -CAP/POA  COPD acute exacerbation  Acute on Chronic Respirtory Failure  Systolic CHF  Acute alcohol withdrawal  Hospital Problem list:    Atrial fibrillation with rapid ventricular response    Essential hypertension    Chronic respiratory failure with hypoxia, on home O2 therapy    Acute " combined systolic and diastolic congestive heart failure    Pericardial effusion      PMH:  Past Medical History:   Diagnosis Date    A-fib     COPD (chronic obstructive pulmonary disease)     Elevated PSA     Hypercholesteremia     Hypertension        Treatment Plan:  PNEUMONIA:  Start/Continue IV antibiotics  Pulmonary toilet (nebulized treatments, Incentive Spirometry, P&PD)  Early Mobilization  Collect sputum culture and blood cultures x2  Supplement 02 as needed to maintain oxygen saturation >92%  DVT prophylaxis with Lovenox and SCDs  Tobacco cessation education provided     ATRIAL FIBRILLATION:  Rate control - utilize cardizem 10mg IV bolus and 5mg/hr as needed to maintain rate <100.  If this fails or BP intolerant, will Add IV amiodarone and/or digoxin  Anticoagulation - utilize Lovenox 1mg/kg subcutaneous every 12 hours if not therapeutic on INR or if not on Xarelto/Pradaxa/Eliquis,etc.  Telemetry monitoring  R/o cardiac ischemia  R/o thyroid disorder  Cardiology consultation ongoing, transition all to oral medications with plans for possible outpatient AVN ablation    COPD:  Start/continue IV steroids and wean as tolerated  Supplemental 02 to maintain oxygen sats approximately 90%, avoiding suppression of respiratory drive in CO2 retainers  Aggressive pulmonary toilet with Duoneb   Early mobilization  Incentive spirometry  DVT prophylaxis  IV antibiotics for evidence of Acute on Chronic Bronchitis  Monitor for respiratory distress  Pulmonary consult as needed       Disposition: home, anticipate discharge home tomorrow    Reviewed treatment plans with the patient and/or family.   20 minutes spent in face to face interaction and coordination of care.     Electronically signed by Luis Reynolds MD on 11/20/2023 at 08:37 CST

## 2023-11-20 NOTE — PROGRESS NOTES
"EP Problems:  1.  Longstanding persistent atrial fibrillation     Cardiology Problems:  1.  Hypertension  2.  Chronic systolic heart failure  3.  Moderate pericardial effusion  4.  Likely pulmonary hypertension     Medical Problems:  1.  COPD on home oxygen  2.  Tobacco use disorder  3.  Alcohol use disorder  4.  Lumbar stenosis    Patient ID:  Burt Mcdonald is a 73 y.o. male with problem list as above as above who EP is following for longstanding persistent AF.      Subjective:  Rates are better controlled now.  Feeling better overall.    Objective:  /53 (BP Location: Left arm, Patient Position: Lying)   Pulse 88   Temp 98.4 °F (36.9 °C) (Oral)   Resp 16   Ht 170.2 cm (67\")   Wt 77.1 kg (170 lb)   SpO2 93%   BMI 26.63 kg/m²     Well appearing. NAD.  CTAB, prolonged expiratory phase, wearing oxygen  BLE, improved  Irregular rate, tachycardic.    Labs today:  Cr 0.86  CO2 41  Hgb 12.5    Inpatient telemetry since yesterday:  Atrial fibrillation, now rate controlled  Echo:  EF 40-45%    Assessment:  Longstanding persistent atrial fibrillation, acute exacerbation with RVR  Acute on chronic diastolic heart failure    Rates are improved overall on diltiazem and digoxin.  Volume status appears improved as well.  Will plan to transition to oral furosemide today and ensure that it is effective.  Otherwise, given the drop in EF and systolic heart failure exacerbation though to be tachycardia mediated, will plan for outpatient CRT-P implant with staged AVN ablation.    Plan:  - Continue diltiazem 90mg q6h  - Continue digoxin 125mcg  - Transition to furosemide 80mg PO daily  - BNP ordered    Part of this note may be an electronic transcription/translation of spoken language to printed text using the Dragon Dictation System.    "

## 2023-11-20 NOTE — PLAN OF CARE
Goal Outcome Evaluation:  Plan of Care Reviewed With: patient        Progress: no change  Outcome Evaluation: VSS. Remains on 2L O2 with sats WNL. No c/o pain. Pt. did require PRN ativan dose x 1 last night d/t high CIWA score, but improved afterwards. Scheduled ativan continues per MAR. Lasix gtt. infusing. PO cardizem given. Safety maintained with bed alarm set. Afib  on tele.

## 2023-11-20 NOTE — PLAN OF CARE
Goal Outcome Evaluation:  Plan of Care Reviewed With: patient           Outcome Evaluation: VSS, HR -127, rates improved today not as elevated and previous day, tolerating po cardizem and digoxin, CIWA score 9, cotinues on schedule ativan,  sleepy at times, continues on Lasix drip, good urine output, BLE less edematous, bed alarm in use, safety maintained

## 2023-11-20 NOTE — PLAN OF CARE
Problem: Adult Inpatient Plan of Care  Goal: Plan of Care Review  Recent Flowsheet Documentation  Taken 11/20/2023 1004 by Miguel Bonilla, PT  Plan of Care Reviewed With: patient  Outcome Evaluation: PT IE complete.  A&Ox4.  No c/o pain.  Pt reports independent PLOF w/o assistive device.  Today he is modified independent bed mobility (HOB elevated).  Min A sit<>stand.  Ambulated 50ft CGA x1 w/ RW on 3L O2.  Posterior lean/loses balance backwards w/o RW.  O2sat 92%.  PT to see for gait safety and strengthening.  Wean from RW as able.  Recommend HH at [facility].  Thank you for referral.   Goal Outcome Evaluation:  Plan of Care Reviewed With: patient           Outcome Evaluation: PT IE complete.  A&Ox4.  No c/o pain.  Pt reports independent PLOF w/o assistive device.  Today he is modified independent bed mobility (HOB elevated).  Min A sit<>stand.  Ambulated 50ft CGA x1 w/ RW on 3L O2.  Posterior lean/loses balance backwards w/o RW.  O2sat 92%.  PT to see for gait safety and strengthening.  Wean from RW as able.  Recommend HH at [facility].  Thank you for referral.      Anticipated Discharge Disposition (PT): home with home health

## 2023-11-20 NOTE — CASE MANAGEMENT/SOCIAL WORK
Continued Stay Note   Efe     Patient Name: Burt Mcdonald  MRN: [id number]  Today's Date: 11/20/2023    Admit Date: 11/16/2023    Plan: Referral to [facility]   Discharge Plan       Row Name 11/20/23 1616       Plan    Plan Referral to [facility]    Plan Comments Spoke to patient's daughter, oRsi, re discharge planning concerns. Rosi does not feel patient needs to go home when he is discharged tomorrow. Rosi spoke to patient about short term rehab and he does agree. Patient/family would like referral made to [facility]. ANSON sent referral to Phyllis in admissions at [facility]. Will await bed offer.               Expected Discharge Date and Time       Expected Discharge Date Expected Discharge Time    Nov 21, 2023         ABY Wagner

## 2023-11-21 ENCOUNTER — PREP FOR SURGERY (OUTPATIENT)
Dept: OTHER | Facility: HOSPITAL | Age: 73
End: 2023-11-21
Payer: MEDICARE

## 2023-11-21 VITALS
OXYGEN SATURATION: 91 % | RESPIRATION RATE: 18 BRPM | TEMPERATURE: 98 F | DIASTOLIC BLOOD PRESSURE: 53 MMHG | BODY MASS INDEX: 26.93 KG/M2 | HEART RATE: 91 BPM | WEIGHT: 171.6 LBS | HEIGHT: 67 IN | SYSTOLIC BLOOD PRESSURE: 91 MMHG

## 2023-11-21 DIAGNOSIS — I48.21 PERMANENT ATRIAL FIBRILLATION: Primary | ICD-10-CM

## 2023-11-21 LAB
ANION GAP SERPL CALCULATED.3IONS-SCNC: 2 MMOL/L (ref 5–15)
BACTERIA SPEC AEROBE CULT: NORMAL
BACTERIA SPEC AEROBE CULT: NORMAL
BASOPHILS # BLD AUTO: 0.03 10*3/MM3 (ref 0–0.2)
BASOPHILS NFR BLD AUTO: 0.4 % (ref 0–1.5)
BUN SERPL-MCNC: 17 MG/DL (ref 8–23)
BUN/CREAT SERPL: 18.5 (ref 7–25)
CALCIUM SPEC-SCNC: 8.9 MG/DL (ref 8.6–10.5)
CHLORIDE SERPL-SCNC: 91 MMOL/L (ref 98–107)
CO2 SERPL-SCNC: 41 MMOL/L (ref 22–29)
CREAT SERPL-MCNC: 0.92 MG/DL (ref 0.76–1.27)
DEPRECATED RDW RBC AUTO: 50.8 FL (ref 37–54)
EGFRCR SERPLBLD CKD-EPI 2021: 87.8 ML/MIN/1.73
EOSINOPHIL # BLD AUTO: 0.07 10*3/MM3 (ref 0–0.4)
EOSINOPHIL NFR BLD AUTO: 1 % (ref 0.3–6.2)
ERYTHROCYTE [DISTWIDTH] IN BLOOD BY AUTOMATED COUNT: 15.3 % (ref 12.3–15.4)
GLUCOSE SERPL-MCNC: 101 MG/DL (ref 65–99)
HCT VFR BLD AUTO: 39.6 % (ref 37.5–51)
HGB BLD-MCNC: 12.3 G/DL (ref 13–17.7)
IMM GRANULOCYTES # BLD AUTO: 0.04 10*3/MM3 (ref 0–0.05)
IMM GRANULOCYTES NFR BLD AUTO: 0.6 % (ref 0–0.5)
LYMPHOCYTES # BLD AUTO: 1.59 10*3/MM3 (ref 0.7–3.1)
LYMPHOCYTES NFR BLD AUTO: 22 % (ref 19.6–45.3)
MCH RBC QN AUTO: 28 PG (ref 26.6–33)
MCHC RBC AUTO-ENTMCNC: 31.1 G/DL (ref 31.5–35.7)
MCV RBC AUTO: 90.2 FL (ref 79–97)
MONOCYTES # BLD AUTO: 0.99 10*3/MM3 (ref 0.1–0.9)
MONOCYTES NFR BLD AUTO: 13.7 % (ref 5–12)
NEUTROPHILS NFR BLD AUTO: 4.51 10*3/MM3 (ref 1.7–7)
NEUTROPHILS NFR BLD AUTO: 62.3 % (ref 42.7–76)
NRBC BLD AUTO-RTO: 0 /100 WBC (ref 0–0.2)
PLATELET # BLD AUTO: 391 10*3/MM3 (ref 140–450)
PMV BLD AUTO: 9.8 FL (ref 6–12)
POTASSIUM SERPL-SCNC: 4.2 MMOL/L (ref 3.5–5.2)
RBC # BLD AUTO: 4.39 10*6/MM3 (ref 4.14–5.8)
SODIUM SERPL-SCNC: 134 MMOL/L (ref 136–145)
WBC NRBC COR # BLD AUTO: 7.23 10*3/MM3 (ref 3.4–10.8)

## 2023-11-21 PROCEDURE — 94799 UNLISTED PULMONARY SVC/PX: CPT

## 2023-11-21 PROCEDURE — 25010000002 THIAMINE PER 100 MG: Performed by: FAMILY MEDICINE

## 2023-11-21 PROCEDURE — 85025 COMPLETE CBC W/AUTO DIFF WBC: CPT | Performed by: FAMILY MEDICINE

## 2023-11-21 PROCEDURE — 94664 DEMO&/EVAL PT USE INHALER: CPT

## 2023-11-21 PROCEDURE — 80048 BASIC METABOLIC PNL TOTAL CA: CPT | Performed by: FAMILY MEDICINE

## 2023-11-21 PROCEDURE — 97116 GAIT TRAINING THERAPY: CPT

## 2023-11-21 PROCEDURE — 97110 THERAPEUTIC EXERCISES: CPT

## 2023-11-21 PROCEDURE — 99232 SBSQ HOSP IP/OBS MODERATE 35: CPT | Performed by: STUDENT IN AN ORGANIZED HEALTH CARE EDUCATION/TRAINING PROGRAM

## 2023-11-21 RX ORDER — SODIUM CHLORIDE 0.9 % (FLUSH) 0.9 %
10 SYRINGE (ML) INJECTION EVERY 12 HOURS SCHEDULED
OUTPATIENT
Start: 2023-11-21

## 2023-11-21 RX ORDER — SODIUM CHLORIDE 0.9 % (FLUSH) 0.9 %
10 SYRINGE (ML) INJECTION AS NEEDED
OUTPATIENT
Start: 2023-11-21

## 2023-11-21 RX ORDER — HYDROCODONE BITARTRATE AND ACETAMINOPHEN 10; 325 MG/1; MG/1
1 TABLET ORAL EVERY 8 HOURS PRN
Qty: 90 TABLET | Refills: 0 | Status: SHIPPED | OUTPATIENT
Start: 2023-11-21

## 2023-11-21 RX ORDER — GABAPENTIN 600 MG/1
600 TABLET ORAL 3 TIMES DAILY
Qty: 90 TABLET | Refills: 0 | Status: SHIPPED | OUTPATIENT
Start: 2023-11-21

## 2023-11-21 RX ORDER — LISINOPRIL 5 MG/1
5 TABLET ORAL
Qty: 30 TABLET | Refills: 1 | Status: SHIPPED | OUTPATIENT
Start: 2023-11-22 | End: 2024-01-21

## 2023-11-21 RX ORDER — SODIUM CHLORIDE 9 MG/ML
40 INJECTION, SOLUTION INTRAVENOUS AS NEEDED
OUTPATIENT
Start: 2023-11-21

## 2023-11-21 RX ORDER — FUROSEMIDE 80 MG
80 TABLET ORAL DAILY
Qty: 30 TABLET | Refills: 1 | Status: SHIPPED | OUTPATIENT
Start: 2023-11-22 | End: 2024-01-21

## 2023-11-21 RX ORDER — DIGOXIN 125 MCG
125 TABLET ORAL
Qty: 30 TABLET | Refills: 1 | Status: SHIPPED | OUTPATIENT
Start: 2023-11-22

## 2023-11-21 RX ORDER — DILTIAZEM HYDROCHLORIDE 360 MG/1
360 CAPSULE, EXTENDED RELEASE ORAL
Qty: 30 CAPSULE | Refills: 1 | Status: SHIPPED | OUTPATIENT
Start: 2023-11-22 | End: 2024-01-21

## 2023-11-21 RX ORDER — CEFAZOLIN SODIUM 2 G/50ML
2000 SOLUTION INTRAVENOUS ONCE
OUTPATIENT
Start: 2023-11-21 | End: 2023-11-21

## 2023-11-21 RX ADMIN — FOLIC ACID 1 MG: 1 TABLET ORAL at 10:01

## 2023-11-21 RX ADMIN — TAMSULOSIN HYDROCHLORIDE 0.4 MG: 0.4 CAPSULE ORAL at 10:00

## 2023-11-21 RX ADMIN — LISINOPRIL 5 MG: 5 TABLET ORAL at 10:00

## 2023-11-21 RX ADMIN — PANTOPRAZOLE SODIUM 40 MG: 40 TABLET, DELAYED RELEASE ORAL at 05:44

## 2023-11-21 RX ADMIN — DILTIAZEM HYDROCHLORIDE 360 MG: 240 CAPSULE, EXTENDED RELEASE ORAL at 10:00

## 2023-11-21 RX ADMIN — FUROSEMIDE 80 MG: 80 TABLET ORAL at 10:01

## 2023-11-21 RX ADMIN — DOXYCYCLINE 100 MG: 100 INJECTION, POWDER, LYOPHILIZED, FOR SOLUTION INTRAVENOUS at 07:26

## 2023-11-21 RX ADMIN — ROSUVASTATIN CALCIUM 10 MG: 10 TABLET, COATED ORAL at 10:00

## 2023-11-21 RX ADMIN — DIGOXIN 125 MCG: 125 TABLET ORAL at 13:17

## 2023-11-21 RX ADMIN — FENOFIBRATE 145 MG: 145 TABLET ORAL at 10:01

## 2023-11-21 RX ADMIN — IPRATROPIUM BROMIDE AND ALBUTEROL SULFATE 3 ML: 2.5; .5 SOLUTION RESPIRATORY (INHALATION) at 09:31

## 2023-11-21 RX ADMIN — DULOXETINE HYDROCHLORIDE 60 MG: 30 CAPSULE, DELAYED RELEASE ORAL at 10:01

## 2023-11-21 RX ADMIN — THIAMINE HYDROCHLORIDE 200 MG: 100 INJECTION, SOLUTION INTRAMUSCULAR; INTRAVENOUS at 13:17

## 2023-11-21 RX ADMIN — IPRATROPIUM BROMIDE AND ALBUTEROL SULFATE 3 ML: 2.5; .5 SOLUTION RESPIRATORY (INHALATION) at 13:50

## 2023-11-21 RX ADMIN — THIAMINE HYDROCHLORIDE 200 MG: 100 INJECTION, SOLUTION INTRAMUSCULAR; INTRAVENOUS at 05:44

## 2023-11-21 RX ADMIN — GABAPENTIN 600 MG: 300 CAPSULE ORAL at 05:44

## 2023-11-21 RX ADMIN — RIVAROXABAN 20 MG: 20 TABLET, FILM COATED ORAL at 17:26

## 2023-11-21 RX ADMIN — BUDESONIDE AND FORMOTEROL FUMARATE DIHYDRATE 2 PUFF: 160; 4.5 AEROSOL RESPIRATORY (INHALATION) at 05:36

## 2023-11-21 RX ADMIN — IPRATROPIUM BROMIDE AND ALBUTEROL SULFATE 3 ML: 2.5; .5 SOLUTION RESPIRATORY (INHALATION) at 05:36

## 2023-11-21 RX ADMIN — GABAPENTIN 600 MG: 300 CAPSULE ORAL at 13:17

## 2023-11-21 NOTE — PLAN OF CARE
Goal Outcome Evaluation:  Plan of Care Reviewed With: (P) patient        Progress: (P) no change  Outcome Evaluation: (P) AF HR: 81-94. Pt. c/o SOB. Pt. on 2L NC with frequent productive cough. Pt. reports pain in back and buttok. Pt, given perscribed pain meds, with relief. Pt. calm and cooperative w/ some anxiety. Tremors noted during care. Pt. coccyx is red and blanchanble, barrier cream applied. Pt. with +2 edema in LLE and +3 in RLE and some tingeling reported in LLE and RLE. Safety maintained through out shift.

## 2023-11-21 NOTE — THERAPY TREATMENT NOTE
Acute Care - Physical Therapy Treatment Note  [facility]     Patient Name: Burt Mcdonald  : 1950  MRN: [id number]  Today's Date: 2023      Visit Dx:     ICD-10-CM ICD-9-CM   1. Atrial fibrillation with rapid ventricular response  I48.91 427.31   2. Community acquired pneumonia, unspecified laterality  J18.9 486   3. Pericardial effusion  I31.39 423.9   4. Impaired mobility [Z74.09]  Z74.09 799.89   5. Insomnia, unspecified type  G47.00 780.52   6. Other chronic pain  G89.29 338.29   7. Lumbar radiculopathy  M54.16 724.4     Patient Active Problem List   Diagnosis    Essential hypertension    Chronic pain    Elevated PSA    Overweight (BMI 25.0-29.9)    Tobacco abuse    Chronic bilateral low back pain without sciatica    Lumbar radiculopathy    Spinal stenosis, lumbar region, with neurogenic claudication    Longstanding persistent atrial fibrillation    Current use of long term anticoagulation    Mixed hyperlipidemia    Chronic respiratory failure with hypoxia, on home O2 therapy    Atrial fibrillation with rapid ventricular response    Acute combined systolic and diastolic congestive heart failure    Pericardial effusion     Past Medical History:   Diagnosis Date    A-fib     COPD (chronic obstructive pulmonary disease)     Elevated PSA     Hypercholesteremia     Hypertension      Past Surgical History:   Procedure Laterality Date    BACK SURGERY      EYE SURGERY  2016    Bilateral cataract     PT Assessment (last 12 hours)       PT Evaluation and Treatment       Row Name 23 1548 23 1025       Physical Therapy Time and Intention    Subjective Information complains of;fatigue  -MF no complaints  -MF    Document Type therapy note (daily note)  -MF therapy note (daily note)  -MF    Mode of Treatment physical therapy  -MF physical therapy  -      Row Name 23 0924          Physical Therapy Time and Intention    Document Type therapy note (daily note)  -MF     Session Not  Performed other (see comments)  -     Comment, Session Not Performed pt busy with other staff at this time. Will check back.  -       Row Name 11/21/23 1548 11/21/23 1025       General Information    Existing Precautions/Restrictions fall  - fall  -      Row Name 11/21/23 1548 11/21/23 1025       Pain    Pretreatment Pain Rating 0/10 - no pain  - 0/10 - no pain  -    Posttreatment Pain Rating 0/10 - no pain  - 0/10 - no pain  -      Row Name 11/21/23 1548 11/21/23 1025       Bed Mobility    Scooting/Bridging [place] (Bed Mobility) standby assist  - --    Supine-Sit [place] (Bed Mobility) verbal cues;minimum assist (75% patient effort)  - --    Sit-Supine [place] (Bed Mobility) contact guard;minimum assist (75% patient effort)  - verbal cues;contact guard;minimum assist (75% patient effort)  -    Assistive Device (Bed Mobility) head of bed elevated;bed rails  - head of bed elevated  -      Row Name 11/21/23 1548 11/21/23 1025       Sit-Stand Transfer    Sit-Stand [place] (Transfers) contact guard  - verbal cues;contact guard  -      Row Name 11/21/23 1548 11/21/23 1025       Stand-Sit Transfer    Stand-Sit [place] (Transfers) contact guard  - verbal cues;contact guard  -      Row Name 11/21/23 1025          Toilet Transfer    Type (Toilet Transfer) sit-stand;stand-sit  -     [place] Level (Toilet Transfer) contact guard  -     Assistive Device (Toilet Transfer) commode;walker, front-wheeled  -       Row Name 11/21/23 1548 11/21/23 1025       Gait/Stairs (Locomotion)    [place] Level (Gait) verbal cues;contact guard  - verbal cues;contact guard  -    Assistive Device (Gait) walker, front-wheeled  - walker, front-wheeled  -    Distance in Feet (Gait) 40  -MF 45', 20'  -MF    Deviations/Abnormal Patterns (Gait) [place] decreased;stride length decreased  -MF kim[place] decreased;stride length decreased  -MF    Bilateral Gait Deviations  forward flexed posture;heel strike decreased  - --    Comment, (Gait/Stairs) pt visibly more fatigued this afternoon. Cues and assist to stay closer to walker.  - --      Row Name 11/21/23 1025          Hip (Therapeutic Exercise)    Hip (Therapeutic Exercise) AROM (active range of motion)  -     Hip AROM (Therapeutic Exercise) bilateral;flexion;sitting;15 repititions  -       Row Name 11/21/23 1025          Knee (Therapeutic Exercise)    Knee (Therapeutic Exercise) AROM (active range of motion)  -     Knee AROM (Therapeutic Exercise) bilateral;LAQ (long arc quad);sitting;15 repititions  -       Row Name 11/21/23 1025          Ankle (Therapeutic Exercise)    Ankle (Therapeutic Exercise) AROM (active range of motion)  -     Ankle AROM (Therapeutic Exercise) bilateral;dorsiflexion;sitting;15 repititions  -       Row Name             Wound Right distal leg Other (comment)    Wound - Properties Group Side: Right  -AJ Orientation: distal  -AJ Location: leg  -AJ Primary Wound Type: Other  -AJ, unspecified skin growth     Retired Wound - Properties Group Side: Right  -AJ Orientation: distal  -AJ Location: leg  -AJ Primary Wound Type: Other  -AJ, unspecified skin growth     Retired Wound - Properties Group Side: Right  -AJ Location: leg  -AJ Primary Wound Type: Other  -AJ, unspecified skin growth       Row Name 11/21/23 1025          Plan of Care Review    Plan of Care Reviewed With patient  -     Progress improving  -     Outcome Evaluation Pt. agreeable to therapy. He demonstrates less tremors today. He was able to participate with LE exercises. He stood with CGA and ambulated a short distance in hallway. He is still requiring a RWX for balance and stability. This is not his baseline. His O2 sat remained in the low-mid 90's while on RA with activity.  He would benefit from further therapy for strengthening and balance training.  -       Row Name 11/21/23 1025          Vital Signs    Pre SpO2 (%) 94   -MF     O2 Delivery Pre Treatment room air  -MF     O2 Delivery Intra Treatment room air  -MF     Post SpO2 (%) 93  -MF     O2 Delivery Post Treatment room air  -MF     Pre Patient Position Sitting  -MF     Intra Patient Position Standing  -MF     Post Patient Position Sitting  -MF       Row Name 11/21/23 1548 11/21/23 1025       Positioning and Restraints    Pre-Treatment Position in bed  -MF sitting in chair/recliner  -MF    Post Treatment Position bed  -MF bed  -MF    In Bed fowlers;call light within reach;encouraged to call for assist;exit alarm on;side rails up x2  -MF fowlers;call light within reach;encouraged to call for assist;with family/caregiver;side rails up x2  -MF              User Key  (r) = Recorded By, (t) = Taken By, (c) = Cosigned By      Initials Name Provider Type     Lisbet Baker PTA Physical Therapist Assistant    Tennille Dennison RN Registered Nurse                    Physical Therapy Education       Title: PT OT SLP Therapies (Done)       Topic: Physical Therapy (Done)       Point: Mobility training (Done)       Learning Progress Summary             Patient Acceptance, E,D, DU,VU by  at 11/20/2023 1107    Comment: benefits of PT and POC, call for A OOB                         Point: Precautions (Done)       Learning Progress Summary             Patient Acceptance, E,D, DU,VU by  at 11/20/2023 1107    Comment: benefits of PT and POC, call for A OOB                                         User Key       Initials Effective Dates Name Provider Type [facility] 02/03/23 -  Miguel Bonilla, PT Physical Therapist PT                  PT Recommendation and Plan     Plan of Care Reviewed With: patient  Progress: improving  Outcome Evaluation: Pt. agreeable to therapy. He demonstrates less tremors today. He was able to participate with LE exercises. He stood with CGA and ambulated a short distance in hallway. He is still requiring a RWX for balance and stability. This is not his  baseline. His O2 sat remained in the low-mid 90's while on RA with activity.  He would benefit from further therapy for strengthening and balance training.   Outcome Measures       Row Name 11/21/23 1025             How much help from another person do you currently need...    Turning from your back to your side while in flat bed without using bedrails? 4  -MF      Moving from lying on back to sitting on the side of a flat bed without bedrails? 3  -MF      Moving to and from a bed to a chair (including a wheelchair)? 3  -MF      Standing up from a chair using your arms (e.g., wheelchair, bedside chair)? 3  -MF      Climbing 3-5 steps with a railing? 2  -MF      To walk in hospital room? 3  -MF      AM-PAC 6 Clicks Score (PT) 18  -MF      Highest Level of Mobility Goal 6 --> Walk 10 steps or more  -MF         Functional Assessment    Outcome Measure Options AM-PAC 6 Clicks Basic Mobility (PT)  -                User Key  (r) = Recorded By, (t) = Taken By, (c) = Cosigned By      Initials Name Provider Type    Lisbet Lee PTA Physical Therapist Assistant                     Time Calculation:    PT Charges       Row Name 11/21/23 1601 11/21/23 1205          Time Calculation    Start Time 1548  -MF 1025  -MF     Stop Time 1601  -MF 1052  -MF     Time Calculation (min) 13 min  -MF 27 min  -MF     PT Received On -- 11/21/23  -        Time Calculation- PT    Total Timed Code Minutes- PT 13 minute(s)  -MF 27 minute(s)  -MF        Timed Charges    28999 - PT Therapeutic Exercise Minutes -- 12  -MF     24073 - Gait Training Minutes  13  -MF 15  -MF        Total Minutes    Timed Charges Total Minutes 13  -MF 27  -MF      Total Minutes 13  -MF 27  -MF               User Key  (r) = Recorded By, (t) = Taken By, (c) = Cosigned By      Initials Name Provider Type    Lisbet Lee PTA Physical Therapist Assistant                  Therapy Charges for Today       Code Description Service Date Service Provider  Modifiers Qty    72976765371 HC PT THER PROC EA 15 MIN 11/20/2023 Lisbet Baker, PTA GP 1    90092490401 HC GAIT TRAINING EA 15 MIN 11/20/2023 Lisbet Baker, PTA GP 1    44179959895 HC PT THERAPEUTIC ACT EA 15 MIN 11/20/2023 Lisbet Baker, PTA GP 1    16782894163 HC PT THER PROC EA 15 MIN 11/21/2023 Lisbet Baker, PTA GP 1    71804012091 HC GAIT TRAINING EA 15 MIN 11/21/2023 Lisbet Baker, PTA GP 1    38754538828 HC GAIT TRAINING EA 15 MIN 11/21/2023 Lisbet Baker, PTA GP 1            PT G-Codes  Outcome Measure Options: AM-PAC 6 Clicks Basic Mobility (PT)  AM-PAC 6 Clicks Score (PT): 18    Lisbet Baker PTA  11/21/2023

## 2023-11-21 NOTE — PROGRESS NOTES
Hospital Discharge Summary    Burt Mcdonald  :  1950  MRN:  [id number]    Admit date:  2023  Discharge date:  2023    Admitting Physician:    Arjun Roman MD    Discharge Diagnoses:      Atrial fibrillation with rapid ventricular response    Essential hypertension    Chronic respiratory failure with hypoxia, on home O2 therapy    Acute combined systolic and diastolic congestive heart failure    Pericardial effusion    Hospital Course:   The patient is a 73 y.o. male who presents with a history of COPD and afib presents to the ER with Afib with RVR and progressive shortness of breath and weakness. He was found to have bilateral pneumonia and to be in afib with RVR. An echo done during hospitalization showed a depressed EF of  40-45%. He has significant lower extremity edema and has had multiple falls recently and near syncope. Consultation with EP during hospitalization with adjustments made to medications. As below. Plan for outpatient CRT-P implant with staged AVN ablation.     He was planning to discharge home, but his girlfriend recently had a stroke and is unable to assist at home. He was approved for and discharged to [facility].    The patient was admitted for the above noted medical/surgical issues. Please see daily progress note for further details concerning their stay. The patient improved throughout their stay and reached maximum medical improvement on the day of discharge. The patient/family agree with the treatment plan as outlined above. All questions concerning their stay were answered prior to discharge. They understand the importance of follow up concerning any abnormal test results.     Physical Exam  See physical exam on progress note with same date.     Discharge Medications:         Discharge Medications        ASK your doctor about these medications        Instructions Start Date   albuterol sulfate  (90 Base) MCG/ACT inhaler  Commonly known as:  PROVENTIL HFA;VENTOLIN HFA;PROAIR HFA   2 puffs, Inhalation, Every 4 Hours PRN      amLODIPine-benazepril 10-40 MG per capsule  Commonly known as: LOTREL   1 capsule, Oral, Daily      bumetanide 1 MG tablet  Commonly known as: BUMEX   1 mg, Oral, Daily      DULoxetine 60 MG capsule  Commonly known as: CYMBALTA   60 mg, Oral, Daily      fenofibrate 145 MG tablet  Commonly known as: TRICOR   145 mg, Oral, Daily      gabapentin 600 MG tablet  Commonly known as: NEURONTIN   600 mg, Oral, 3 Times Daily      HYDROcodone-acetaminophen  MG per tablet  Commonly known as: NORCO   1 tablet, Oral, Every 8 Hours PRN      metoprolol tartrate 25 MG tablet  Commonly known as: LOPRESSOR  Ask about: Which instructions should I use?   25 mg, Oral, 2 Times Daily      omeprazole 20 MG capsule  Commonly known as: priLOSEC   20 mg, Oral, Daily      rivaroxaban 20 MG tablet  Commonly known as: XARELTO   20 mg, Oral, Daily      rosuvastatin 10 MG tablet  Commonly known as: CRESTOR   10 mg, Oral, Daily      tamsulosin 0.4 MG capsule 24 hr capsule  Commonly known as: FLOMAX   1 capsule, Oral, Daily      Trelegy Ellipta 200-62.5-25 MCG/ACT aerosol powder   Generic drug: Fluticasone-Umeclidin-Vilant   1 puff, Inhalation, Daily      triazolam 0.25 MG tablet  Commonly known as: HALCION   0.25 mg, Oral, Nightly PRN      vitamin D 1.25 MG (12957 UT) capsule capsule  Commonly known as: ERGOCALCIFEROL   50,000 Units, Oral, Weekly               Activity: as tolerated    Diet: regular    Consults: cardiology    Significant Diagnostic Studies:    CT Angiogram Chest    Result Date: 11/16/2023  1. No evidence of pulmonary embolism. No aortic aneurysm or dissection. 2. The nodular infiltrate in the right lower lobe probably represent an acute inflammatory/infectious process. Further follow-up is recommended. 3. Mild to moderate bronchitis/bronchiolitis/small airway disease in the lower lobes right more than the left. 4. Moderate pericardial effusion.  5. A small right basal pleural effusion.             This report was signed and finalized on 11/16/2023 2:45 PM CST by Dr. Candace Mccarthy MD.      XR Chest 1 View    Result Date: 11/16/2023  Patchy right basilar infiltrate, atelectasis versus less likely pneumonia.  This report was signed and finalized on 11/16/2023 11:12 AM CST by Dr. Arjun Norris MD.             Treatments:   as above    Disposition:   discharge to [facility]    Time spent on discharge including discussion with patient/family, SW, and coordination of care.     Follow up with Arjun Roman MD in 1-2 weeks.    Signed:  Luis Reynolds MD   11/21/2023, 14:55 CST

## 2023-11-21 NOTE — THERAPY TREATMENT NOTE
Acute Care - Physical Therapy Treatment Note  [facility]     Patient Name: Burt Mcdonald  : 1950  MRN: [id number]  Today's Date: 2023      Visit Dx:     ICD-10-CM ICD-9-CM   1. Atrial fibrillation with rapid ventricular response  I48.91 427.31   2. Community acquired pneumonia, unspecified laterality  J18.9 486   3. Pericardial effusion  I31.39 423.9   4. Impaired mobility [Z74.09]  Z74.09 799.89     Patient Active Problem List   Diagnosis    Essential hypertension    Chronic pain    Elevated PSA    Overweight (BMI 25.0-29.9)    Tobacco abuse    Chronic bilateral low back pain without sciatica    Lumbar radiculopathy    Spinal stenosis, lumbar region, with neurogenic claudication    Longstanding persistent atrial fibrillation    Current use of long term anticoagulation    Mixed hyperlipidemia    Chronic respiratory failure with hypoxia, on home O2 therapy    Atrial fibrillation with rapid ventricular response    Acute combined systolic and diastolic congestive heart failure    Pericardial effusion     Past Medical History:   Diagnosis Date    A-fib     COPD (chronic obstructive pulmonary disease)     Elevated PSA     Hypercholesteremia     Hypertension      Past Surgical History:   Procedure Laterality Date    BACK SURGERY      EYE SURGERY  2016    Bilateral cataract     PT Assessment (last 12 hours)       PT Evaluation and Treatment       Row Name 23 1025 23 0924       Physical Therapy Time and Intention    Subjective Information no complaints  -MF --    Document Type therapy note (daily note)  - therapy note (daily note)  -    Mode of Treatment physical therapy  - --    Session Not Performed -- other (see comments)  -    Comment, Session Not Performed -- pt busy with other staff at this time. Will check back.  -      Row Name 23 1025          General Information    Existing Precautions/Restrictions fall  -       Row Name 23 1025          Pain     Pretreatment Pain Rating 0/10 - no pain  -     Posttreatment Pain Rating 0/10 - no pain  -       Row Name 11/21/23 1025          Bed Mobility    Sit-Supine [place] (Bed Mobility) verbal cues;contact guard;minimum assist (75% patient effort)  -     Assistive Device (Bed Mobility) head of bed elevated  -       Row Name 11/21/23 1025          Sit-Stand Transfer    Sit-Stand [place] (Transfers) verbal cues;contact guard  -       Row Name 11/21/23 1025          Stand-Sit Transfer    Stand-Sit [place] (Transfers) verbal cues;contact guard  -       Row Name 11/21/23 1025          Toilet Transfer    Type (Toilet Transfer) sit-stand;stand-sit  -     [place] Level (Toilet Transfer) contact guard  -     Assistive Device (Toilet Transfer) commode;walker, front-wheeled  -       Row Name 11/21/23 1025          Gait/Stairs (Locomotion)    [place] Level (Gait) verbal cues;contact guard  -     Assistive Device (Gait) walker, front-wheeled  -     Distance in Feet (Gait) 45', 20'  -     Deviations/Abnormal Patterns (Gait) [place] decreased;stride length decreased  -       Row Name 11/21/23 1025          Hip (Therapeutic Exercise)    Hip (Therapeutic Exercise) AROM (active range of motion)  -     Hip AROM (Therapeutic Exercise) bilateral;flexion;sitting;15 repititions  -       Row Name 11/21/23 1025          Knee (Therapeutic Exercise)    Knee (Therapeutic Exercise) AROM (active range of motion)  -     Knee AROM (Therapeutic Exercise) bilateral;LAQ (long arc quad);sitting;15 repititions  -       Row Name 11/21/23 1025          Ankle (Therapeutic Exercise)    Ankle (Therapeutic Exercise) AROM (active range of motion)  -     Ankle AROM (Therapeutic Exercise) bilateral;dorsiflexion;sitting;15 repititions  -       Row Name             Wound Right distal leg Other (comment)    Wound - Properties Group Side: Right  -AJ Orientation: distal  -AJ Location: leg  -AJ Primary Wound  Type: Other  -AJ, unspecified skin growth     Retired Wound - Properties Group Side: Right  -AJ Orientation: distal  -AJ Location: leg  -AJ Primary Wound Type: Other  -AJ, unspecified skin growth     Retired Wound - Properties Group Side: Right  -AJ Location: leg  -AJ Primary Wound Type: Other  -AJ, unspecified skin growth       Row Name 11/21/23 1025          Plan of Care Review    Plan of Care Reviewed With patient  -MF     Progress improving  -     Outcome Evaluation Pt. agreeable to therapy. He demonstrates less tremors today. He was able to participate with LE exercises. He stood with CGA and ambulated a short distance in hallway. He is still requiring a RWX for balance and stability. This is not his baseline. His O2 sat remained in the low-mid 90's while on RA with activity.  He would benefit from further therapy for strengthening and balance training.  -       Row Name 11/21/23 1025          Vital Signs    Pre SpO2 (%) 94  -MF     O2 Delivery Pre Treatment room air  -MF     O2 Delivery Intra Treatment room air  -MF     Post SpO2 (%) 93  -MF     O2 Delivery Post Treatment room air  -MF     Pre Patient Position Sitting  -MF     Intra Patient Position Standing  -MF     Post Patient Position Sitting  -       Row Name 11/21/23 1025          Positioning and Restraints    Pre-Treatment Position sitting in chair/recliner  -MF     Post Treatment Position bed  -MF     In Bed fowlers;call light within reach;encouraged to call for assist;with family/caregiver;side rails up x2  -MF               User Key  (r) = Recorded By, (t) = Taken By, (c) = Cosigned By      Initials Name Provider Type    Lisbet Lee PTA Physical Therapist Assistant    Tennille Dennison, RN Registered Nurse                    Physical Therapy Education       Title: PT OT SLP Therapies (Done)       Topic: Physical Therapy (Done)       Point: Mobility training (Done)       Learning Progress Summary             Patient Acceptance, E,D,  ROSEANN OZUNA by  at 11/20/2023 1107    Comment: benefits of PT and POC, call for A OOB                         Point: Precautions (Done)       Learning Progress Summary             Patient Acceptance, E,D, ROSEANN OZUNA by  at 11/20/2023 1107    Comment: benefits of PT and POC, call for A OOB                                         User Key       Initials Effective Dates Name Provider Type Discipline     02/03/23 -  Miguel Bonilla, PT Physical Therapist PT                  PT Recommendation and Plan     Plan of Care Reviewed With: patient  Progress: improving  Outcome Evaluation: Pt. agreeable to therapy. He demonstrates less tremors today. He was able to participate with LE exercises. He stood with CGA and ambulated a short distance in hallway. He is still requiring a RWX for balance and stability. This is not his baseline. His O2 sat remained in the low-mid 90's while on RA with activity.  He would benefit from further therapy for strengthening and balance training.   Outcome Measures       Row Name 11/21/23 1025             How much help from another person do you currently need...    Turning from your back to your side while in flat bed without using bedrails? 4  -MF      Moving from lying on back to sitting on the side of a flat bed without bedrails? 3  -MF      Moving to and from a bed to a chair (including a wheelchair)? 3  -MF      Standing up from a chair using your arms (e.g., wheelchair, bedside chair)? 3  -MF      Climbing 3-5 steps with a railing? 2  -MF      To walk in hospital room? 3  -MF      AM-PAC 6 Clicks Score (PT) 18  -MF      Highest Level of Mobility Goal 6 --> Walk 10 steps or more  -         Functional Assessment    Outcome Measure Options AM-PAC 6 Clicks Basic Mobility (PT)  -MF                User Key  (r) = Recorded By, (t) = Taken By, (c) = Cosigned By      Initials Name Provider Type    Lisbet Lee, PTA Physical Therapist Assistant                     Time Calculation:    PT Charges        Row Name 11/21/23 1205             Time Calculation    Start Time 1025  -MF      Stop Time 1052  -MF      Time Calculation (min) 27 min  -MF      PT Received On 11/21/23  -MF         Time Calculation- PT    Total Timed Code Minutes- PT 27 minute(s)  -MF         Timed Charges    49144 - PT Therapeutic Exercise Minutes 12  -MF      54585 - Gait Training Minutes  15  -MF         Total Minutes    Timed Charges Total Minutes 27  -MF       Total Minutes 27  -MF                User Key  (r) = Recorded By, (t) = Taken By, (c) = Cosigned By      Initials Name Provider Type    Lisbet Lee PTA Physical Therapist Assistant                  Therapy Charges for Today       Code Description Service Date Service Provider Modifiers Qty    41946745639 HC PT THER PROC EA 15 MIN 11/20/2023 Lisbet Baker, PTA GP 1    41593199195 HC GAIT TRAINING EA 15 MIN 11/20/2023 Lisbet Baker, PTA GP 1    95175190280 HC PT THERAPEUTIC ACT EA 15 MIN 11/20/2023 Lisbet Baker, PTA GP 1    97842454341 HC PT THER PROC EA 15 MIN 11/21/2023 Lisbet Baker, PTA GP 1    32699645419 HC GAIT TRAINING EA 15 MIN 11/21/2023 Lisbet Baker, PTA GP 1            PT G-Codes  Outcome Measure Options: AM-PAC 6 Clicks Basic Mobility (PT)  AM-PAC 6 Clicks Score (PT): 18    Lisbet Baker PTA  11/21/2023

## 2023-11-21 NOTE — PROGRESS NOTES
Daily Progress Note  Burt Mcdonald  MRN: [id number] LOS: 5    Admit Date: 11/16/2023 11/21/2023 08:00 CST    Subjective:      Chief Complaint:  Chief Complaint   Patient presents with    Atrial Fibrillation    Cough    Shortness of Breath       Interval History:    Reviewed overnight events and nursing notes.   The patient is resting comfortably this morning. Less shaky and sob is improving.    Review of Systems   Constitutional:  Positive for activity change and fatigue.   HENT:  Positive for congestion.    Respiratory:  Positive for cough and shortness of breath.    Cardiovascular: Negative.    Gastrointestinal: Negative.    Genitourinary: Negative.    Neurological:  Positive for tremors and weakness.       DIET:  Diet: Cardiac Diets; Healthy Heart (2-3 Na+); Texture: Regular Texture (IDDSI 7); Fluid Consistency: Thin (IDDSI 0)    Medications:        budesonide-formoterol, 2 puff, Inhalation, BID - RT  digoxin, 125 mcg, Oral, Daily  dilTIAZem CD, 360 mg, Oral, Q24H  doxycycline, 100 mg, Intravenous, Q12H  DULoxetine, 60 mg, Oral, Daily  fenofibrate, 145 mg, Oral, Daily  folic acid, 1 mg, Oral, Daily  furosemide, 80 mg, Oral, Daily  gabapentin, 600 mg, Oral, Q8H  ipratropium-albuterol, 3 mL, Nebulization, 4x Daily - RT  lisinopril, 5 mg, Oral, Q24H  pantoprazole, 40 mg, Oral, Q AM  rivaroxaban, 20 mg, Oral, Daily With Dinner  rosuvastatin, 10 mg, Oral, Daily  tamsulosin, 0.4 mg, Oral, Daily  thiamine (B-1) IV, 200 mg, Intravenous, Q8H   Followed by  [START ON 11/23/2023] thiamine, 100 mg, Oral, Daily        Data:     Code Status:   Code Status and Medical Interventions:   Ordered at: 11/16/23 1812     Level Of Support Discussed With:    Patient     Code Status (Patient has no pulse and is not breathing):    CPR (Attempt to Resuscitate)     Medical Interventions (Patient has pulse or is breathing):    Full Support       Family History   Problem Relation Age of Onset    Cancer Father         LUNG    Cancer  Mother         LUNG    No Known Problems Sister     No Known Problems Brother     No Known Problems Brother     No Known Problems Brother      Social History     Socioeconomic History    Marital status: Single   Tobacco Use    Smoking status: Every Day     Packs/day: 1     Types: Cigarettes    Smokeless tobacco: Current     Types: Chew   Vaping Use    Vaping Use: Never used   Substance and Sexual Activity    Alcohol use: Yes     Alcohol/week: 3.0 standard drinks of alcohol     Types: 2 Cans of beer, 1 Shots of liquor per week     Comment: daily    Drug use: No    Sexual activity: Defer       Labs:  Lab Results (last 72 hours)       Procedure Component Value Units Date/Time    Basic Metabolic Panel [[id number]]  (Abnormal) Collected: 11/21/23 0451    Specimen: Blood Updated: 11/21/23 0556     Glucose 101 mg/dL      BUN 17 mg/dL      Creatinine 0.92 mg/dL      Sodium 134 mmol/L      Potassium 4.2 mmol/L      Chloride 91 mmol/L      CO2 41.0 mmol/L      Calcium 8.9 mg/dL      BUN/Creatinine Ratio 18.5     Anion Gap 2.0 mmol/L      eGFR 87.8 mL/min/1.73     Narrative:      GFR Normal >60  Chronic Kidney Disease <60  Kidney Failure <15    The GFR formula is only valid for adults with stable renal function between ages 18 and 70.    CBC & Differential [[id number]]  (Abnormal) Collected: 11/21/23 0451    Specimen: Blood Updated: 11/21/23 0536    Narrative:      The following orders were created for panel order CBC & Differential.  Procedure                               Abnormality         Status                     ---------                               -----------         ------                     CBC Auto Differential[[id number]]        Abnormal            Final result                 Please view results for these tests on the individual orders.    CBC Auto Differential [[id number]]  (Abnormal) Collected: 11/21/23 0451    Specimen: Blood Updated: 11/21/23 0536     WBC 7.23 10*3/mm3      RBC 4.39 10*6/mm3      Hemoglobin  12.3 g/dL      Hematocrit 39.6 %      MCV 90.2 fL      MCH 28.0 pg      MCHC 31.1 g/dL      RDW 15.3 %      RDW-SD 50.8 fl      MPV 9.8 fL      Platelets 391 10*3/mm3      Neutrophil % 62.3 %      Lymphocyte % 22.0 %      Monocyte % 13.7 %      Eosinophil % 1.0 %      Basophil % 0.4 %      Immature Grans % 0.6 %      Neutrophils, Absolute 4.51 10*3/mm3      Lymphocytes, Absolute 1.59 10*3/mm3      Monocytes, Absolute 0.99 10*3/mm3      Eosinophils, Absolute 0.07 10*3/mm3      Basophils, Absolute 0.03 10*3/mm3      Immature Grans, Absolute 0.04 10*3/mm3      nRBC 0.0 /100 WBC     Blood Culture - Blood, Arm, Right [[id number]]  (Normal) Collected: 11/16/23 1450    Specimen: Blood from Arm, Right Updated: 11/20/23 1532     Blood Culture No growth at 4 days    Blood Culture - Blood, Arm, Right [[id number]]  (Normal) Collected: 11/16/23 1505    Specimen: Blood from Arm, Right Updated: 11/20/23 1532     Blood Culture No growth at 4 days    BNP [[id number]]  (Normal) Collected: 11/20/23 0521    Specimen: Blood Updated: 11/20/23 0709     proBNP 612.4 pg/mL     Narrative:      This assay is used as an aid in the diagnosis of individuals suspected of having heart failure. It can be used as an aid in the diagnosis of acute decompensated heart failure (ADHF) in patients presenting with signs and symptoms of ADHF to the emergency department (ED). In addition, NT-proBNP of <300 pg/mL indicates ADHF is not likely.    Age Range Result Interpretation  NT-proBNP Concentration (pg/mL:      <50             Positive            >450                   Gray                 300-450                    Negative             <300    50-75           Positive            >900                  Gray                300-900                  Negative            <300      >75             Positive            >1800                  Gray                300-1800                  Negative            <300    Basic Metabolic Panel [[id number]]  (Abnormal)  Collected: 11/20/23 0521    Specimen: Blood Updated: 11/20/23 0646     Glucose 104 mg/dL      BUN 14 mg/dL      Creatinine 0.86 mg/dL      Sodium 138 mmol/L      Potassium 4.0 mmol/L      Chloride 93 mmol/L      CO2 41.0 mmol/L      Calcium 8.9 mg/dL      BUN/Creatinine Ratio 16.3     Anion Gap 4.0 mmol/L      eGFR 91.4 mL/min/1.73     Narrative:      GFR Normal >60  Chronic Kidney Disease <60  Kidney Failure <15    The GFR formula is only valid for adults with stable renal function between ages 18 and 70.    CBC & Differential [[id number]]  (Abnormal) Collected: 11/20/23 0521    Specimen: Blood Updated: 11/20/23 0616    Narrative:      The following orders were created for panel order CBC & Differential.  Procedure                               Abnormality         Status                     ---------                               -----------         ------                     CBC Auto Differential[[id number]]        Abnormal            Final result                 Please view results for these tests on the individual orders.    CBC Auto Differential [[id number]]  (Abnormal) Collected: 11/20/23 0521    Specimen: Blood Updated: 11/20/23 0616     WBC 6.97 10*3/mm3      RBC 4.45 10*6/mm3      Hemoglobin 12.5 g/dL      Hematocrit 40.7 %      MCV 91.5 fL      MCH 28.1 pg      MCHC 30.7 g/dL      RDW 15.5 %      RDW-SD 51.8 fl      MPV 9.5 fL      Platelets 447 10*3/mm3      Neutrophil % 60.7 %      Lymphocyte % 23.7 %      Monocyte % 14.2 %      Eosinophil % 0.6 %      Basophil % 0.4 %      Immature Grans % 0.4 %      Neutrophils, Absolute 4.23 10*3/mm3      Lymphocytes, Absolute 1.65 10*3/mm3      Monocytes, Absolute 0.99 10*3/mm3      Eosinophils, Absolute 0.04 10*3/mm3      Basophils, Absolute 0.03 10*3/mm3      Immature Grans, Absolute 0.03 10*3/mm3      nRBC 0.0 /100 WBC     Basic Metabolic Panel [[id number]]  (Abnormal) Collected: 11/19/23 0423    Specimen: Blood Updated: 11/19/23 0528     Glucose 112 mg/dL      BUN 16  "mg/dL      Creatinine 0.87 mg/dL      Sodium 139 mmol/L      Potassium 4.0 mmol/L      Chloride 96 mmol/L      CO2 39.0 mmol/L      Calcium 8.3 mg/dL      BUN/Creatinine Ratio 18.4     Anion Gap 4.0 mmol/L      eGFR 91.1 mL/min/1.73     Narrative:      GFR Normal >60  Chronic Kidney Disease <60  Kidney Failure <15    The GFR formula is only valid for adults with stable renal function between ages 18 and 70.    CBC & Differential [[id number]]  (Abnormal) Collected: 11/19/23 0423    Specimen: Blood Updated: 11/19/23 0507    Narrative:      The following orders were created for panel order CBC & Differential.  Procedure                               Abnormality         Status                     ---------                               -----------         ------                     CBC Auto Differential[[id number]]        Abnormal            Final result                 Please view results for these tests on the individual orders.    CBC Auto Differential [[id number]]  (Abnormal) Collected: 11/19/23 0423    Specimen: Blood Updated: 11/19/23 0507     WBC 7.13 10*3/mm3      RBC 3.87 10*6/mm3      Hemoglobin 10.7 g/dL      Hematocrit 35.7 %      MCV 92.2 fL      MCH 27.6 pg      MCHC 30.0 g/dL      RDW 15.5 %      RDW-SD 52.1 fl      MPV 9.5 fL      Platelets 452 10*3/mm3      Neutrophil % 61.2 %      Lymphocyte % 22.4 %      Monocyte % 14.9 %      Eosinophil % 0.4 %      Basophil % 0.4 %      Immature Grans % 0.7 %      Neutrophils, Absolute 4.36 10*3/mm3      Lymphocytes, Absolute 1.60 10*3/mm3      Monocytes, Absolute 1.06 10*3/mm3      Eosinophils, Absolute 0.03 10*3/mm3      Basophils, Absolute 0.03 10*3/mm3      Immature Grans, Absolute 0.05 10*3/mm3      nRBC 0.0 /100 WBC                     Objective:     Vitals: BP 96/59 (BP Location: Left arm, Patient Position: Lying) Comment: RN notified  Pulse 86   Temp 97.7 °F (36.5 °C) (Oral)   Resp 16   Ht 170.2 cm (67\")   Wt 77.8 kg (171 lb 9.6 oz)   SpO2 95%   BMI " 26.88 kg/m²    Intake/Output Summary (Last 24 hours) at 2023 0800  Last data filed at 2023 0433  Gross per 24 hour   Intake 180 ml   Output 1200 ml   Net -1020 ml    Temp (24hrs), Av °F (36.7 °C), Min:97.7 °F (36.5 °C), Max:98.4 °F (36.9 °C)      Physical Exam  Vitals and nursing note reviewed.   Constitutional:       Appearance: Normal appearance.   HENT:      Head: Normocephalic and atraumatic.      Mouth/Throat:      Mouth: Mucous membranes are moist.   Eyes:      Pupils: Pupils are equal, round, and reactive to light.   Cardiovascular:      Rate and Rhythm: Normal rate and regular rhythm.      Pulses: Normal pulses.      Heart sounds: Normal heart sounds.   Pulmonary:      Effort: Pulmonary effort is normal.   Abdominal:      General: Abdomen is flat. Bowel sounds are normal.   Musculoskeletal:         General: Normal range of motion.   Skin:     General: Skin is warm.      Capillary Refill: Capillary refill takes less than 2 seconds.   Neurological:      General: No focal deficit present.      Mental Status: He is alert.             Assessment and Plan:     Primary Problem:  Atrial fibrillation with rapid ventricular response  Bilateral pneumonia -CAP/POA  COPD acute exacerbation  Acute on Chronic Respirtory Failure  Systolic CHF  Acute alcohol withdrawal  Hospital Problem list:    Atrial fibrillation with rapid ventricular response    Essential hypertension    Chronic respiratory failure with hypoxia, on home O2 therapy    Acute combined systolic and diastolic congestive heart failure    Pericardial effusion      PMH:  Past Medical History:   Diagnosis Date    A-fib     COPD (chronic obstructive pulmonary disease)     Elevated PSA     Hypercholesteremia     Hypertension        Treatment Plan:  PNEUMONIA:  Continue IV antibiotics, to complete today  Pulmonary toilet (nebulized treatments, Incentive Spirometry, P&PD)  Early Mobilization  Supplement 02 as needed to maintain oxygen saturation  >92%  DVT prophylaxis with Lovenox and SCDs  Tobacco cessation education provided     ATRIAL FIBRILLATION:  Rate control - utilize cardizem 10mg IV bolus and 5mg/hr as needed to maintain rate <100.  If this fails or BP intolerant, will Add IV amiodarone and/or digoxin  Anticoagulation - utilize Lovenox 1mg/kg subcutaneous every 12 hours if not therapeutic on INR or if not on Xarelto/Pradaxa/Eliquis,etc.  Telemetry monitoring  R/o cardiac ischemia  R/o thyroid disorder  Cardiology consultation ongoing, transition all to oral medications with plans for possible outpatient AVN ablation    COPD:  Supplemental 02 to maintain oxygen sats approximately 90%, avoiding suppression of respiratory drive in CO2 retainers  Aggressive pulmonary toilet with Duoneb   Early mobilization  Incentive spirometry  DVT prophylaxis  IV antibiotics for evidence of Acute on Chronic Bronchitis  Monitor for respiratory distress    Disposition: Family now requesting SNF, referrals placed to [facility].  Medically stable for discharge when SNF approval.    Reviewed treatment plans with the patient and/or family.   20 minutes spent in face to face interaction and coordination of care.     Electronically signed by Luis Reynolds MD on 11/21/2023 at 08:00 CST

## 2023-11-21 NOTE — CASE MANAGEMENT/SOCIAL WORK
Continued Stay Note   [place]     Patient Name: Burt Mcdonald  MRN: [id number]  Today's Date: 11/21/2023    Admit Date: 11/16/2023    Plan: [facility]   Discharge Plan       Row Name 11/21/23 0942       Plan    Plan [facility]    Plan Comments Patient is accepted to [facility]. Phyllis is going to check on bed availability and will call  back shortly.               Expected Discharge Date and Time       Expected Discharge Date Expected Discharge Time    Nov 21, 2023               ABY Wagner

## 2023-11-21 NOTE — PLAN OF CARE
Goal Outcome Evaluation:  Plan of Care Reviewed With: patient        Progress: improving  Outcome Evaluation: Pt. agreeable to therapy. He demonstrates less tremors today. He was able to participate with LE exercises. He stood with CGA and ambulated a short distance in hallway. He is still requiring a RWX for balance and stability. This is not his baseline. His O2 sat remained in the low-mid 90's while on RA with activity.  He would benefit from further therapy for strengthening and balance training.

## 2023-11-21 NOTE — PLAN OF CARE
Problem: Adult Inpatient Plan of Care  Goal: Plan of Care Review  Outcome: Ongoing, Progressing  Flowsheets  Taken 11/21/2023 0724 by Namrata Paredes, RN  Progress: improving  Plan of Care Reviewed With: patient  Taken 11/21/2023 0651 by Greg Agee, Nursing Student  Outcome Evaluation: AF HR: 81-94. Pt. c/o SOB. Pt. on 2L NC with frequent productive cough. Pt. reports pain in back and coccyx. Pt given PRN pain meds, with relief. Pt calm and cooperative w/ some anxiety. Tremors noted during care. Coccyx red and blanchable, barrier cream applied. Encouraged turning q 2 hours. Pt turns himself. +2 edema noted in LLE and +3 noted in RLE and some tingling reported in LLE and RLE. Safety maintained throughout shift.

## 2023-11-21 NOTE — CASE MANAGEMENT/SOCIAL WORK
Continued Stay Note  [facility]     Patient Name: Burt Mcdonald  MRN: [id number]  Today's Date: 11/21/2023    Admit Date: 11/16/2023    Plan: [facility]   Discharge Plan       Row Name 11/21/23 1031       Plan    Plan [facility]    Final Discharge Disposition Code 03 - skilled nursing facility (SNF)    Final Note Patient is accepted at [facility] and room is ready today.  notified Dr. Reynolds and updated pharmacy to [facility] ([facility]). Discharge summary fax: [phone]. RN report [phone].                 Expected Discharge Date and Time       Expected Discharge Date Expected Discharge Time    Nov 21, 2023         ABY Wagner

## 2023-11-21 NOTE — PROGRESS NOTES
"EP Problems:  1.  Permanent atrial fibrillation     Cardiology Problems:  1.  Hypertension  2.  Chronic systolic heart failure  3.  Moderate pericardial effusion  4.  Likely pulmonary hypertension     Medical Problems:  1.  COPD on home oxygen  2.  Tobacco use disorder  3.  Alcohol use disorder  4.  Lumbar stenosis    Patient ID:  Burt Mcdonald is a 73 y.o. male with problem list as above as above who EP is following for permanent AF.      Subjective:  Continues to diurese on oral regiment    Objective:  BP 96/59 (BP Location: Left arm, Patient Position: Lying) Comment: RN notified  Pulse 86   Temp 97.7 °F (36.5 °C) (Oral)   Resp 16   Ht 170.2 cm (67\")   Wt 77.8 kg (171 lb 9.6 oz)   SpO2 95%   BMI 26.88 kg/m²     Well appearing. NAD.  CTAB, prolonged expiratory phase, wearing oxygen  1+ BLE  Irregular rate, normal rate.    Labs today:  Cr 0.92  CO2 41  Hgb 12.3    Inpatient telemetry since yesterday:  Atrial fibrillation, now rate controlled    Assessment:  Permanent atrial fibrillation, acute exacerbation with RVR  Acute on chronic diastolic heart failure    From an EP standpoint appears stable for discharge home on current rate control and diuretic regiment.  Will plan for outpatient CRT-P implant with staged AVN ablation.    Plan:  - Continue diltiazem 90mg q6h  - Continue digoxin 125mcg  - Continue furosemide 80mg PO daily  - OK for discharge home from EP standpoint    Part of this note may be an electronic transcription/translation of spoken language to printed text using the Dragon Dictation System.    "

## 2023-11-22 NOTE — DISCHARGE SUMMARY
Hospital Discharge Summary    Burt Mcdonald  :  1950  MRN:  [id number]    Admit date:  2023  Discharge date:  2023    Admitting Physician:    Arjun Roman MD    Discharge Diagnoses:      Atrial fibrillation with rapid ventricular response    Essential hypertension    Chronic respiratory failure with hypoxia, on home O2 therapy    Acute combined systolic and diastolic congestive heart failure    Pericardial effusion    Hospital Course:   The patient is a 73 y.o. male who presents with a history of COPD and afib presents to the ER with Afib with RVR and progressive shortness of breath and weakness. He was found to have bilateral pneumonia and to be in afib with RVR. An echo done during hospitalization showed a depressed EF of  40-45%. He has significant lower extremity edema and has had multiple falls recently and near syncope. Consultation with EP during hospitalization with adjustments made to medications. As below. Plan for outpatient CRT-P implant with staged AVN ablation.     He was planning to discharge home, but his girlfriend recently had a stroke and is unable to assist at home. He was approved for and discharged to [facility].    The patient was admitted for the above noted medical/surgical issues. Please see daily progress note for further details concerning their stay. The patient improved throughout their stay and reached maximum medical improvement on the day of discharge. The patient/family agree with the treatment plan as outlined above. All questions concerning their stay were answered prior to discharge. They understand the importance of follow up concerning any abnormal test results.     Physical Exam  See physical exam on progress note with same date.     Discharge Medications:         Discharge Medications        ASK your doctor about these medications        Instructions Start Date   albuterol sulfate  (90 Base) MCG/ACT inhaler  Commonly known as:  PROVENTIL HFA;VENTOLIN HFA;PROAIR HFA   2 puffs, Inhalation, Every 4 Hours PRN      amLODIPine-benazepril 10-40 MG per capsule  Commonly known as: LOTREL   1 capsule, Oral, Daily      bumetanide 1 MG tablet  Commonly known as: BUMEX   1 mg, Oral, Daily      DULoxetine 60 MG capsule  Commonly known as: CYMBALTA   60 mg, Oral, Daily      fenofibrate 145 MG tablet  Commonly known as: TRICOR   145 mg, Oral, Daily      gabapentin 600 MG tablet  Commonly known as: NEURONTIN   600 mg, Oral, 3 Times Daily      HYDROcodone-acetaminophen  MG per tablet  Commonly known as: NORCO   1 tablet, Oral, Every 8 Hours PRN      metoprolol tartrate 25 MG tablet  Commonly known as: LOPRESSOR  Ask about: Which instructions should I use?   25 mg, Oral, 2 Times Daily      omeprazole 20 MG capsule  Commonly known as: priLOSEC   20 mg, Oral, Daily      rivaroxaban 20 MG tablet  Commonly known as: XARELTO   20 mg, Oral, Daily      rosuvastatin 10 MG tablet  Commonly known as: CRESTOR   10 mg, Oral, Daily      tamsulosin 0.4 MG capsule 24 hr capsule  Commonly known as: FLOMAX   1 capsule, Oral, Daily      Trelegy Ellipta 200-62.5-25 MCG/ACT aerosol powder   Generic drug: Fluticasone-Umeclidin-Vilant   1 puff, Inhalation, Daily      triazolam 0.25 MG tablet  Commonly known as: HALCION   0.25 mg, Oral, Nightly PRN      vitamin D 1.25 MG (07594 UT) capsule capsule  Commonly known as: ERGOCALCIFEROL   50,000 Units, Oral, Weekly               Activity: as tolerated    Diet: regular    Consults: cardiology    Significant Diagnostic Studies:    CT Angiogram Chest    Result Date: 11/16/2023  1. No evidence of pulmonary embolism. No aortic aneurysm or dissection. 2. The nodular infiltrate in the right lower lobe probably represent an acute inflammatory/infectious process. Further follow-up is recommended. 3. Mild to moderate bronchitis/bronchiolitis/small airway disease in the lower lobes right more than the left. 4. Moderate pericardial effusion.  5. A small right basal pleural effusion.             This report was signed and finalized on 11/16/2023 2:45 PM CST by Dr. Candace Mccarthy MD.      XR Chest 1 View    Result Date: 11/16/2023  Patchy right basilar infiltrate, atelectasis versus less likely pneumonia.  This report was signed and finalized on 11/16/2023 11:12 AM CST by Dr. Arjun Norris MD.             Treatments:   as above    Disposition:   discharge to [facility]    Time spent on discharge including discussion with patient/family, SW, and coordination of care.     Follow up with Arjun Roman MD in 1-2 weeks.    Signed:  Luis Reynolds MD   11/21/2023, 14:55 CST

## 2023-11-22 NOTE — THERAPY DISCHARGE NOTE
Acute Care - Physical Therapy Discharge Summary  [facility]       Patient Name: Burt Mcdonald  : 1950  MRN: [id number]    Today's Date: 2023                 Admit Date: 2023      PT Recommendation and Plan    Visit Dx:    ICD-10-CM ICD-9-CM   1. Atrial fibrillation with rapid ventricular response  I48.91 427.31   2. Community acquired pneumonia, unspecified laterality  J18.9 486   3. Pericardial effusion  I31.39 423.9   4. Impaired mobility [Z74.09]  Z74.09 799.89   5. Insomnia, unspecified type  G47.00 780.52   6. Other chronic pain  G89.29 338.29   7. Lumbar radiculopathy  M54.16 724.4        Outcome Measures       Row Name 23 1025             How much help from another person do you currently need...    Turning from your back to your side while in flat bed without using bedrails? 4  -MF      Moving from lying on back to sitting on the side of a flat bed without bedrails? 3  -MF      Moving to and from a bed to a chair (including a wheelchair)? 3  -MF      Standing up from a chair using your arms (e.g., wheelchair, bedside chair)? 3  -MF      Climbing 3-5 steps with a railing? 2  -MF      To walk in hospital room? 3  -MF      AM-PAC 6 Clicks Score (PT) 18  -MF      Highest Level of Mobility Goal 6 --> Walk 10 steps or more  -         Functional Assessment    Outcome Measure Options AM-PAC 6 Clicks Basic Mobility (PT)  -                User Key  (r) = Recorded By, (t) = Taken By, (c) = Cosigned By      Initials Name Provider Type    Lisbet Lee PTA Physical Therapist Assistant                         PT Rehab Goals       Row Name 23 1200             Bed Mobility Goal 1 (PT)    Activity/Assistive Device (Bed Mobility Goal 1, PT) bed mobility activities, all  -KJ      [place] Level/Cues Needed (Bed Mobility Goal 1, PT) independent  -KJ      Time Frame (Bed Mobility Goal 1, PT) 10 days  -KJ      Progress/Outcomes (Bed Mobility Goal 1, PT) goal not met  -KJ          Transfer Goal 1 (PT)    Activity/Assistive Device (Transfer Goal 1, PT) sit-to-stand/stand-to-sit  -KJ      [place] Level/Cues Needed (Transfer Goal 1, PT) independent  -KJ      Time Frame (Transfer Goal 1, PT) 10 days  -KJ      Progress/Outcome (Transfer Goal 1, PT) goal not met  -KJ         Gait Training Goal 1 (PT)    Activity/Assistive Device (Gait Training Goal 1, PT) gait (walking locomotion);decrease fall risk  -KJ      [place] Level (Gait Training Goal 1, PT) independent  -KJ      Distance (Gait Training Goal 1, PT) 150ft  -KJ      Time Frame (Gait Training Goal 1, PT) 10 days  -KJ      Progress/Outcome (Gait Training Goal 1, PT) goal not met  -KJ                User Key  (r) = Recorded By, (t) = Taken By, (c) = Cosigned By      Initials Name Provider Type Discipline    Rosi Ralph, PHANI Physical Therapist Assistant PT                        PT Discharge Summary  Anticipated Discharge Disposition (PT): skilled nursing facility  Reason for Discharge: Discharge from facility  Outcomes Achieved: Refer to plan of care for updates on goals achieved  Discharge Destination: SNF      Rosi Bojorquez PTA   11/22/2023

## 2023-11-27 NOTE — PROGRESS NOTES
Enter Query Response Below      Query Response: Bacterial pneumonia unspecified     Electronically signed by Luis Reynolds MD, 23, 7:44 AM CST.               If applicable, please update the problem list.     Patient: Burt Mcdonald        : 1950  Account: [id number]           Admit Date: 2023        How to Respond to this query:       a. Click New Note     b. Answer query within the yellow box.                c. Update the Problem List, if applicable.      If you have any questions about this query contact me at: [email or web address]     ,    Risk factors: 73-year-old male with congestive heart failure, COPD and chronic respiratory failure.  Clinical indicators: He is diagnosed with bilateral pneumonia.  Treatment: Rocephin (-), Cleocin () and Doxycycline    Please clarify the type of pneumonia the patient was treated/monitored for:    Gram negative pneumonia (excluding Haemophilus influenzae)  Bacterial pneumonia unspecified  Other- specify______  Unable to determine    By submitting this query, we are merely seeking further clarification of documentation to accurately reflect all conditions that you are monitoring, evaluating, treating or that extend the hospitalization or utilize additional resources of care. Please utilize your independent clinical judgment when addressing the question(s) above.     This query and your response, once completed, will be entered into the legal medical record.    Sincerely,  Rosi ALCAZAR, RN  Clinical Documentation Integrity Program   [email or web address]

## 2023-11-27 NOTE — PROGRESS NOTES
"Enter Query Response Below      Query Response: Chronic respiratory failure only, acute respiratory failure was not supported    Electronically signed by Luis Reynolds MD, 23, 7:43 AM CST.               If applicable, please update the problem list.     Patient: Burt Mcdonald        : 1950  Account: [id number]           Admit Date: 2023        How to Respond to this query:       a. Click New Note     b. Answer query within the yellow box.                c. Update the Problem List, if applicable.      If you have any questions about this query contact me at: Sincerely,  Rosi ALCAZAR, RN  Clinical Documentation Integrity Program   [email or web address]     ,    Patient is diagnosed per H&P and progress notes - with acute on chronic respiratory failure.  ED provider note documents \"Pulmonary effort is normal. Tachypnea present. No respiratory distress or retractions.\"  He is on home oxygen and is maintained this entire stay on 2L O2 per nasal cannula with no SpO2 readings below 90%, with the exception of one reading of 85% on , with no resulting change in oxygen therapy.  There is no documented respiratory distress and only one notation of abdominal muscle use with ambulation on  @ , with no change in oxygen therapy per respiratory therapy flowsheet.    After study, was acute respiratory failure clinically supported during this admission?    Acute respiratory failure was supported with additional clinical indicators:____________  Chronic respiratory failure only, acute respiratory failure was not supported  Other- specify_____________  Unable to determine    By submitting this query, we are merely seeking further clarification of documentation to accurately reflect all conditions that you are monitoring, evaluating, treating or that extend the hospitalization or utilize additional resources of care. Please utilize your independent clinical judgment " when addressing the question(s) above.     This query and your response, once completed, will be entered into the legal medical record.    Sincerely,  Rosi ALCAZAR, RN  Clinical Documentation Integrity Program   [email or web address]

## 2023-12-05 ENCOUNTER — TELEPHONE (OUTPATIENT)
Dept: CARDIOLOGY | Facility: CLINIC | Age: 73
End: 2023-12-05
Payer: MEDICARE

## 2023-12-05 NOTE — TELEPHONE ENCOUNTER
Caller: [facility]    Relationship to patient: Provider    Best call back number: [phone]    Chief complaint: PATIENT NEEDS TO FOLLOW UP ON WHAT MEDICATION ARE SUPPOSED TO BE TAKEN DUE TO RECENT HOSPITAL ADMISSION    Type of visit: FOLLOW UP    Requested date: ASAP         Additional notes:PATIENT IS SCHEDULED FOR 2/16/24 BUT HOME NURSE STATES HE NEEDS TO BE SEEN SOONER

## 2023-12-05 NOTE — TELEPHONE ENCOUNTER
Call returned to [facility]. Patient scheduled for 12/18/23. Needs a med rec at visit

## 2023-12-11 ENCOUNTER — DOCUMENTATION (OUTPATIENT)
Dept: CARDIOLOGY | Facility: CLINIC | Age: 73
End: 2023-12-11
Payer: MEDICARE

## 2023-12-11 NOTE — PROGRESS NOTES
Mr. Mcdonald has refractory RVR with his AF causing worsening heart failure symptoms.  As per the current 2023 AF guidelines:    Class IIa:  In patients with AF and uncontrolled rapid ventricular response refractory to rate-control medications (who are not candidates for or in whom rhythm control has been unsuccessful), AVNA can be useful to improve symptoms and QOL.    Class I:  In patients with AF who are planned to undergo AVNA, implantation of a pacemaker before the  ablation (ie, before or same day of ablation) is  recommended to ensure adequacy of the pacing leads before performing ablation.    Results reviewed:  11/16/23 echo:  EF 41-45%  NYHA class III 2/2 NICM    According to current CMS guidelines, CRT will be considered medically necessary when the following criteria for a given beneficiary are met:  In patients with atrial fibrillation (AF) or in sinus rhythm who have an indication for pacemaker implant for second or third degree atrioventricular (AV) block (including those who have or will have AV toy ablation), or very prolonged first degree block with SD > 300 ms, and:  with an EF < 50%; and  with NYHA I, II or III class; and  anticipated frequent ventricular pacing    As such, we will plan for a CRT-P pacemaker implant with AV node ablation for the treatment of AF with refractory and symptomatic RVR.

## 2023-12-12 ENCOUNTER — TELEPHONE (OUTPATIENT)
Dept: CARDIOLOGY | Facility: CLINIC | Age: 73
End: 2023-12-12
Payer: MEDICARE

## 2023-12-12 NOTE — TELEPHONE ENCOUNTER
Pt is not willing to have AV node ablation and Biv PM implant on 12/14/23.  He does not remember discussing this in hospital.  His  nurse told me they were unaware he had agreed to the procedures.  No cardiology follow-up was made after discharge, so she made pt an appt w/VIVIANA Washburn, on Monday 12/18/23.  He wants to see you in office to discuss procedures.  Thanks!

## 2023-12-14 NOTE — PROGRESS NOTES
Subjective:     Encounter Date:07/14/2022      Patient ID: Burt Mcdonald is a 71 y.o. male with know persistent atrial fibrillation with long-term anticoagulation, hypertension, COPD, chronic hypoxic respiratory failure with noctural oxygen  and ongoing tobacco abuse.  He presents to the office today for routine follow up.     Chief Complaint: Atrial Fibrillation Follow up  Atrial Fibrillation  Presents for follow-up visit. Symptoms are negative for bradycardia, chest pain, dizziness, hemodynamic instability, hypertension, hypotension, palpitations, shortness of breath, syncope, tachycardia and weakness. The symptoms have been stable. Past medical history includes atrial fibrillation.     The patient was evaluated in our office on 5/24/2021 by Dr. Nick Snider.  He was referred to our office due to the finding of atrial fibrillation by his primary care physician. He had been started on anticoagulation by his PCP. Due to the new finding, unknown chronicity, the patient was set up for outpatient cardioversion. Prior to his office appointment he was placed in a Holter monitor for surveillance. He remained in atrial fibrillation throughout the entirety of the monitoring period.    On 6/2/2021 the patient had successful cardioversion. He displayed a sinus rhythm post cardioversion. He was discharged home in stable condition. He denies any changes in symptoms. He has a history of hypertension and COPD with ongoing tobacco abuse with mild dyspnea on exertion. His shortness of breath is chronic due to his lung history. He denied any other symptoms such as lightheadedness, dizziness, chest pain, palpitations. Post cardioversion the patient denied any change in his symptoms.    At the follow-up in July 2021 he was found to be back in atrial fibrillation on EKG and was stable and asymptomatic.  Since he had no reported symptoms that changed after cardioversion (and was feeling well without complaints), it was felt that  rate control and anticoagulation was the best strategy.     He presents today for follow up.  He has been stable from his previous visit from a cardiac standpoint.  He denies any palpitations, lightheadedness, dizziness, periods of prolonged tachycardia.  He reports no chest pain, chest pressure, chest discomfort.  He has stable chronic shortness of breath worse with exertion.  He continues to have chronic back pain which is slightly worsened with radiculopathy.  He has pain into his right lower extremity.  He complains of burning to his bilateral lower extremities with the right side being worse than the left.  He has noted that he has gait instability now due to these complaints.  He has intermittent lower extremity swelling which generally resolves on its own and does not last for days.  He is compliant with his home medications.  He has home health with checks on him routinely.  He continues to follow with his primary care office and was recently started on Cymbalta.    The following portions of the patient's history were reviewed and updated as appropriate: allergies, current medications, past family history, past medical history, past social history and past surgical history.     No Known Allergies      Current Outpatient Medications:   •  amLODIPine-benazepril (LOTREL) 10-40 MG per capsule, Take 1 capsule by mouth Daily., Disp: , Rfl:   •  DULoxetine (CYMBALTA) 60 MG capsule, Take 60 mg by mouth Daily., Disp: , Rfl:   •  fenofibrate (TRICOR) 145 MG tablet, Take 145 mg by mouth Daily., Disp: , Rfl:   •  gabapentin (NEURONTIN) 600 MG tablet, Take 600 mg by mouth 3 (Three) Times a Day., Disp: , Rfl:   •  HYDROcodone-acetaminophen (NORCO)  MG per tablet, Take 1 tablet by mouth Every 8 (Eight) Hours As Needed., Disp: , Rfl:   •  omeprazole (priLOSEC) 20 MG capsule, Take 20 mg by mouth Daily., Disp: , Rfl:   •  rivaroxaban (XARELTO) 20 MG tablet, Take 20 mg by mouth Daily., Disp: , Rfl:   •  rosuvastatin  (CRESTOR) 10 MG tablet, Take 10 mg by mouth Daily., Disp: , Rfl:   •  STIOLTO RESPIMAT 2.5-2.5 MCG/ACT aerosol solution, Inhale 2 puffs Daily., Disp: , Rfl:   •  tamsulosin (FLOMAX) 0.4 MG capsule 24 hr capsule, Take 1 capsule by mouth Daily., Disp: , Rfl:   •  triazolam (HALCION) 0.25 MG tablet, Take 0.25 mg by mouth At Night As Needed., Disp: , Rfl:   •  VENTOLIN  (90 BASE) MCG/ACT inhaler, Inhale 2 puffs Every 4 (Four) Hours As Needed., Disp: , Rfl:   •  vitamin D (ERGOCALCIFEROL) 1.25 MG (24616 UT) capsule capsule, Take 50,000 Units by mouth 1 (One) Time Per Week., Disp: , Rfl:   •  metoprolol tartrate (LOPRESSOR) 25 MG tablet, Take 1 tablet by mouth 2 (Two) Times a Day., Disp: 60 tablet, Rfl: 11    Review of Systems   Constitutional: Negative for fever and malaise/fatigue.   HENT: Negative for nosebleeds.    Cardiovascular: Positive for dyspnea on exertion ( chronic ). Negative for chest pain, irregular heartbeat, leg swelling, near-syncope, orthopnea, palpitations, paroxysmal nocturnal dyspnea and syncope.   Respiratory: Negative for cough, shortness of breath, sputum production and wheezing.    Hematologic/Lymphatic: Negative for bleeding problem. Bruises/bleeds easily.   Musculoskeletal: Positive for back pain ( radiculopathy).   Gastrointestinal: Negative for bloating, abdominal pain, nausea and vomiting.   Neurological: Positive for paresthesias ( burning to BLE R>L ). Negative for dizziness, focal weakness, headaches, light-headedness and weakness.   Psychiatric/Behavioral: Negative for altered mental status.        +stress       ECG 12 Lead    Date/Time: 7/14/2022 10:11 AM  Performed by: Christelle Mohamud APRN  Authorized by: Christelle Mohamud APRN   Comparison: compared with previous ECG from 1/14/2022  Similar to previous ECG  Rhythm: atrial fibrillation  Rate: tachycardic  BPM: 109  Conduction: conduction normal  QRS axis: normal  Other findings: poor R wave progression    Clinical impression:  "abnormal EKG          /75 (BP Location: Right arm, Patient Position: Sitting, Cuff Size: Adult)   Pulse 109   Resp 18   Ht 170.2 cm (67\")   Wt 81.2 kg (179 lb)   SpO2 98%   BMI 28.04 kg/m²        Objective:     Vitals reviewed.   Constitutional:       General: Awake. Not in acute distress.     Appearance: Normal appearance. Well-developed, well-groomed, normal weight and not in distress. Chronically ill-appearing. Not diaphoretic.   Eyes:      General:         Right eye: No discharge.         Left eye: No discharge.   HENT:      Head: Normocephalic and atraumatic.    Mouth/Throat:      Pharynx: No oropharyngeal exudate.   Pulmonary:      Effort: Pulmonary effort is normal. No respiratory distress.      Breath sounds: Decreased breath sounds present. No wheezing. No rhonchi. No rales.   Chest:      Chest wall: Not tender to palpatation.   Cardiovascular:      Tachycardia present. Irregularly irregular rhythm.      Murmurs: There is no murmur.      No gallop. No rub.   Edema:     Peripheral edema absent.   Abdominal:      Palpations: Abdomen is soft.   Musculoskeletal: Normal range of motion.      Cervical back: Normal range of motion and neck supple. Skin:     General: Skin is warm and dry.      Coloration: Skin is not pale.   Neurological:      Mental Status: Alert, oriented to person, place, and time and oriented to person, place and time.   Psychiatric:         Attention and Perception: Attention normal.         Mood and Affect: Mood normal.         Speech: Speech normal.         Behavior: Behavior normal. Behavior is cooperative.         Thought Content: Thought content normal.         Cognition and Memory: Cognition normal.         Judgment: Judgment normal.       Lab Review:     Interpretation Summary  [facility] - 6/2/21  · Post cardioversion the patient displayed a sinus rhythm.  · The cardioversion was successful.       Results for orders placed during the hospital encounter of 05/20/21    Adult " Transthoracic Echo Complete W/ Cont if Necessary Per Protocol    Interpretation Summary  · Left ventricular systolic function is normal. Left ventricular ejection fraction appears to be 56 - 60%.  · Left ventricular wall thickness is consistent with mild concentric hypertrophy.  · Normal right ventricular cavity size and systolic function noted.  · Mild tricuspid valve regurgitation is present. Estimated right ventricular systolic pressure from tricuspid regurgitation is normal (<35 mmHg).        Assessment:          Diagnosis Plan   1. Longstanding persistent atrial fibrillation (HCC)     2. Current use of long term anticoagulation     3. Essential hypertension     4. Mixed hyperlipidemia     5. Chronic respiratory failure with hypoxia, on home O2 therapy (HCC)     6. Tobacco abuse            Plan:       -Atrial fibrillation: persistent.  Previous cardioversion with return of atrial fibrillation and follow-up without change in symptoms.  Rate control strategy moving forward.  His heart rates are slightly elevated today and were at his last visit.  He is on no rate controlling medication.  We will start low-dose metoprolol tartrate 25 mg twice daily.  He remains anticoagulated with Xarelto and denies any bleeding issues.  [id number] score of 2 given his history of hypertension and his age.      -Anticoagulation: Continue Xarelto 20 mg daily with food.     -Hypertension: his blood pressure is well controlled. He follows with his PCP office routinely for management.     -Hyperlipidemia: The patient has been started on rosuvastatin 10 mg daily by his PCP office.  He has routine labs drawn at their facility.  He is tolerating this medication without complaints.    -Chronic respiratory failure: On nocturnal oxygen.  Chronic stable shortness of breath.  Compliant with home inhalers.  Previous asbestos exposure.    -Tobacco abuse: Advised smoking cessation.      Add metoprolol tartrate 25 mg twice daily.  Continue  current medications including anticoagulation.  Follow-up in 6 months, call sooner if needed.      Advance Care Planning   ACP discussion was held with the patient during this visit. Patient does not have an advance directive, information provided.      I attest that all portions of this note have been reviewed and updated to reflect the patient's current status.          Abdomen soft, non-tender, no guarding.

## 2024-01-08 ENCOUNTER — OFFICE VISIT (OUTPATIENT)
Dept: CARDIOLOGY | Facility: CLINIC | Age: 74
End: 2024-01-08
Payer: MEDICARE

## 2024-01-08 VITALS
RESPIRATION RATE: 18 BRPM | DIASTOLIC BLOOD PRESSURE: 60 MMHG | HEART RATE: 67 BPM | OXYGEN SATURATION: 92 % | HEIGHT: 67 IN | SYSTOLIC BLOOD PRESSURE: 116 MMHG | BODY MASS INDEX: 26.37 KG/M2 | WEIGHT: 168 LBS

## 2024-01-08 DIAGNOSIS — I50.42 CHRONIC COMBINED SYSTOLIC AND DIASTOLIC CONGESTIVE HEART FAILURE: ICD-10-CM

## 2024-01-08 DIAGNOSIS — I48.21 PERMANENT ATRIAL FIBRILLATION: Primary | ICD-10-CM

## 2024-01-08 PROBLEM — I48.91 ATRIAL FIBRILLATION WITH RAPID VENTRICULAR RESPONSE: Status: RESOLVED | Noted: 2023-11-16 | Resolved: 2024-01-08

## 2024-01-08 NOTE — PROGRESS NOTES
"Chief Complaint  Atrial Fibrillation (1 MO FU - DISCUSS ABLATION )    Subjective        History of Present Illness    EP Problems:  1.  Permanent atrial fibrillation     Cardiology Problems:  1.  Hypertension  2.  Chronic systolic heart failure  3.  Moderate pericardial effusion  4.  Likely pulmonary hypertension     Medical Problems:  1.  COPD on home oxygen  2.  Tobacco use disorder  3.  Alcohol use disorder  4.  Lumbar stenosis        Burt Mcdonald is a 73 y.o. male with problem list as above who presents to the clinic for follow up of permanent atrial fibrillation.  He was seen in the hospital in November for atrial fibrillation exacerbation in combination with heart failure exacerbation.  He has done well since time of discharge without further hospitalization.  He has been drinking less.  He was initially scheduled for CRT-P implant and AV node ablation though did not recall our conversations regarding this procedure while he was in the hospital and wanted to talk about it more.    Objective   Vital Signs:  /60 (BP Location: Right arm, Patient Position: Sitting)   Pulse 67   Resp 18   Ht 170.2 cm (67\")   Wt 76.2 kg (168 lb)   SpO2 92% Comment: ON 2L OF O2  BMI 26.31 kg/m²   Estimated body mass index is 26.31 kg/m² as calculated from the following:    Height as of this encounter: 170.2 cm (67\").    Weight as of this encounter: 76.2 kg (168 lb).      Physical Exam  Vitals reviewed.   Constitutional:       Comments: Chronically ill-appearing, wearing oxygen   Cardiovascular:      Rate and Rhythm: Normal rate and regular rhythm. Rhythm irregular.      Pulses: Normal pulses.      Heart sounds: Normal heart sounds.   Pulmonary:      Effort: Pulmonary effort is normal.      Comments: Prolonged expiratory phase  Musculoskeletal:         General: No swelling.   Neurological:      Mental Status: He is alert and oriented to person, place, and time.   Psychiatric:         Mood and Affect: Mood normal.    "      Judgment: Judgment normal.        Result Review :  The following data was reviewed by: Amy Chou MD on 01/08/2024:    [id number] SCORE   [id number] Score: 3 (1/8/2024  3:42 PM)          ECG 12 Lead    Date/Time: 1/8/2024 3:42 PM  Performed by: Amy Chou MD    Authorized by: Amy Chou MD  Comparison: compared with previous ECG from 11/16/2023  Comparison to previous ECG: Ventricular rates have improved  Rhythm: atrial fibrillation  Rate: normal  BPM: 67  Conduction: conduction normal  Q wave noted on lead: Anterior infarction pattern.    QRS axis: right  Other findings: non-specific ST-T wave changes    Clinical impression: abnormal EKG              Assessment and Plan   Diagnoses and all orders for this visit:    1. Permanent atrial fibrillation (Primary)    2. Chronic combined systolic and diastolic congestive heart failure    Other orders  -     ECG 12 Lead        Burt Mcdonald is a 73 y.o. male with problem list as above who presents to the clinic for follow up of permanent atrial fibrillation and chronic combined systolic and diastolic heart failure.  We again discussed the available data regarding permanent atrial fibrillation in patients who have systolic heart failure prompting hospitalization.  There is some data to suggest that these patients would benefit more from CRT implant and AV node ablation (even despite adequate rate control) when atrial fibrillation appears to be driving heart failure hospitalizations.  From this standpoint, I still think that CRT-P implant and AV node ablation would be reasonable treatment options.  However, the patient has notably been doing well since time of his discharge and is somewhat hesitant to undergo any procedures at this time.  With this in mind, continued rate control certainly is a reasonable option as well.  For now, we will plan to continue his digoxin and diltiazem and if his symptoms worsen in the future, can reconsider more aggressive  intervention.    Plan:  -Holding on CRT-P implant and AV node ablation per patient preferences  -Rates are well-controlled on current regimen; continue digoxin and diltiazem at current doses  -Advised to avoid heavy alcohol intake as this may be a contributing factor to both his RVR as well as heart failure       Follow Up   Return in about 6 months (around 7/8/2024).  Patient was given instructions and counseling regarding his condition or for health maintenance advice. Please see specific information pulled into the AVS if appropriate.     Part of this note may be an electronic transcription/translation of spoken language to printed text using the Dragon Dictation System.

## 2024-01-08 NOTE — PATIENT INSTRUCTIONS
6 month follow up  No medication changes for now  Call me if you notice worsening symptoms or have additional ER or hospital visits for your atrial fibrillation or heart failure

## 2024-02-19 ENCOUNTER — OFFICE VISIT (OUTPATIENT)
Dept: CARDIOLOGY | Facility: CLINIC | Age: 74
End: 2024-02-19
Payer: MEDICARE

## 2024-02-19 VITALS
HEART RATE: 73 BPM | WEIGHT: 162 LBS | BODY MASS INDEX: 25.43 KG/M2 | DIASTOLIC BLOOD PRESSURE: 68 MMHG | RESPIRATION RATE: 18 BRPM | OXYGEN SATURATION: 97 % | SYSTOLIC BLOOD PRESSURE: 126 MMHG | HEIGHT: 67 IN

## 2024-02-19 DIAGNOSIS — I48.21 PERMANENT ATRIAL FIBRILLATION: ICD-10-CM

## 2024-02-19 DIAGNOSIS — I10 ESSENTIAL HYPERTENSION: Chronic | ICD-10-CM

## 2024-02-19 DIAGNOSIS — J96.11 CHRONIC RESPIRATORY FAILURE WITH HYPOXIA, ON HOME O2 THERAPY: Chronic | ICD-10-CM

## 2024-02-19 DIAGNOSIS — Z99.81 CHRONIC RESPIRATORY FAILURE WITH HYPOXIA, ON HOME O2 THERAPY: Chronic | ICD-10-CM

## 2024-02-19 DIAGNOSIS — E78.2 MIXED HYPERLIPIDEMIA: Chronic | ICD-10-CM

## 2024-02-19 DIAGNOSIS — Z72.0 TOBACCO ABUSE: Chronic | ICD-10-CM

## 2024-02-19 DIAGNOSIS — I50.42 CHRONIC COMBINED SYSTOLIC AND DIASTOLIC CONGESTIVE HEART FAILURE: Primary | ICD-10-CM

## 2024-02-19 DIAGNOSIS — Z79.01 CURRENT USE OF LONG TERM ANTICOAGULATION: Chronic | ICD-10-CM

## 2024-02-19 PROBLEM — F10.10 ETOH ABUSE: Chronic | Status: ACTIVE | Noted: 2024-02-19

## 2024-02-19 PROBLEM — F10.10 ETOH ABUSE: Status: ACTIVE | Noted: 2024-02-19

## 2024-02-19 PROCEDURE — 99214 OFFICE O/P EST MOD 30 MIN: CPT | Performed by: NURSE PRACTITIONER

## 2024-02-19 PROCEDURE — 3078F DIAST BP <80 MM HG: CPT | Performed by: NURSE PRACTITIONER

## 2024-02-19 PROCEDURE — 3074F SYST BP LT 130 MM HG: CPT | Performed by: NURSE PRACTITIONER

## 2024-02-19 PROCEDURE — 1159F MED LIST DOCD IN RCRD: CPT | Performed by: NURSE PRACTITIONER

## 2024-02-19 PROCEDURE — 1160F RVW MEDS BY RX/DR IN RCRD: CPT | Performed by: NURSE PRACTITIONER

## 2024-02-19 PROCEDURE — 93000 ELECTROCARDIOGRAM COMPLETE: CPT | Performed by: NURSE PRACTITIONER

## 2024-02-19 RX ORDER — GUAIFENESIN 600 MG/1
TABLET, EXTENDED RELEASE ORAL
COMMUNITY
Start: 2024-01-30

## 2024-02-19 RX ORDER — DILTIAZEM HYDROCHLORIDE 60 MG/1
CAPSULE, EXTENDED RELEASE ORAL
COMMUNITY
Start: 2024-01-30

## 2024-02-19 RX ORDER — DILTIAZEM HYDROCHLORIDE 360 MG/1
CAPSULE, EXTENDED RELEASE ORAL
COMMUNITY
Start: 2024-02-02

## 2024-02-19 NOTE — PROGRESS NOTES
Subjective:     Encounter Date: 02/19/2024      Patient ID: Burt Mcdonald is a 73 y.o. male with known persistent atrial fibrillation with long-term anticoagulation, hypertension, COPD, chronic hypoxic respiratory failure with home oxygen, chronic systolic and diastolic heart failure, and ongoing tobacco and EtOH use.  He presents to the office today for follow up.     Chief Complaint: CHF/AF Follow Up   Atrial Fibrillation  Presents for follow-up visit. Symptoms include shortness of breath (chronic). Symptoms are negative for bradycardia, chest pain, dizziness, hemodynamic instability, hypertension, hypotension, palpitations, syncope, tachycardia and weakness. The symptoms have been stable. Past medical history includes atrial fibrillation and CHF. There are no medication compliance problems.   Congestive Heart Failure  Presents for follow-up visit. Associated symptoms include fatigue and shortness of breath (chronic). Pertinent negatives include no chest pain, chest pressure, edema, near-syncope, palpitations or unexpected weight change. The symptoms have been stable. Compliance with total regimen is 51-75%. Compliance problems include adherence to exercise.  Compliance with diet is 51-75%. Compliance with exercise is 26-50%. Compliance with medications is %.     The patient was evaluated in our office on 5/24/2021 by Dr. Nick Snider.  He was referred to our office due to the finding of atrial fibrillation by his primary care physician. He had been started on anticoagulation by his PCP. Due to the new finding, unknown chronicity, the patient was set up for outpatient cardioversion. Prior to his office appointment he was placed in a Holter monitor for surveillance. He remained in atrial fibrillation throughout the entirety of the monitoring period.    On 6/2/2021 the patient had successful cardioversion. He displayed a sinus rhythm post cardioversion. He was discharged home in stable condition.  He denies any changes in symptoms. He has a history of hypertension and COPD with ongoing tobacco abuse with mild dyspnea on exertion. His shortness of breath is chronic due to his lung history. He denied any other symptoms such as lightheadedness, dizziness, chest pain, palpitations. Post cardioversion the patient denied any change in his symptoms.    At the follow-up in July 2021 he was found to be back in atrial fibrillation on EKG and was stable and asymptomatic.  Since he had no reported symptoms that changed after cardioversion (and was feeling well without complaints), it was felt that rate control and anticoagulation was the best strategy.  He has been stable with routine outpatient follow ups.    Mr. Mcdonald presents today for follow up and is with his daughter.  Since his last visit in our office he has been hospitalized. He was brought to the ED after increased weakness and confusion at home as well as shortness of breath. He was found to be in atrial fibrillation (baseline) with RVR and was admitted for treatment of COPD and pneumonia. During his hospital stay he was evaluated by electrophysiology. During his hospital stay medication adjustments were made for improvement of rate control. His beta blocker was stopped - due to his chronic lung disease and diltiazem was started. He was also started on digoxin. An echo was obtained, noting a slight drop in LVEF to 41-45%.  It was felt that his lung disease and acute illness were contributory to his elevated heart rates, but that his AF and current respiratory illness be contributing to his heart failure symptoms.  Also, it was discussed that long term he may continue to have issues with heart failure symptoms and worsening LVEF if he has ongoing bouts of uncontrolled heart rates. Therefore, other options, such as ablation, AVN ablation, and device implant were discussed with the patient.     He has seen EP in the outpatient setting to further discuss these  options as he did not remember those discussions as in inpatient. He has elected to continue to pursue rate control and medication management of heart failure at this time.     He has home health that sees him 2 a week to make sure that his medications are managed and take his vital signs. He is wearing oxygen. He sees his PCP, Dr. Roman. He admits to ongoing use of alcohol, but less than prior. He continues to smoke. He completed time at a SNF and continues with PT at home currently. He is not interested in assisted living even though this has been encouraged by his daughter. He lives at home with his long time girlfriend who he has helped to care for as she has had prior stroke.     Currently he has stable shortness of breath. He denies any swelling and reports that this has been much better since her got out of the hospital. He takes his lasix daily. He has been weighing and tells me he stays around 2 pounds of his weights on home scale. He has no chest pain or tightness. He denies palpitations. He denies any lightheadedness. He feels that he has been improved since PT with SNF.     The following portions of the patient's history were reviewed and updated as appropriate: allergies, current medications, past family history, past medical history, past social history and past surgical history.     No Known Allergies    <<<<<Awaiting clarification of med list  from home health RN>>>>    Current Outpatient Medications:     albuterol sulfate  (90 Base) MCG/ACT inhaler, Inhale 2 puffs Every 4 (Four) Hours As Needed for Wheezing or Shortness of Air., Disp: , Rfl:     digoxin (LANOXIN) 125 MCG tablet, Take 1 tablet by mouth Daily., Disp: 30 tablet, Rfl: 1    dilTIAZem (TIAZAC) 360 MG 24 hr capsule, , Disp: , Rfl:     dilTIAZem CD (CARDIZEM CD) 360 MG 24 hr capsule, Take 1 capsule by mouth Daily for 60 days., Disp: 30 capsule, Rfl: 1    dilTIAZem SR (CARDIZEM SR) 60 MG 12 hr capsule, , Disp: , Rfl:     DULoxetine  (CYMBALTA) 60 MG capsule, Take 1 capsule by mouth Daily., Disp: , Rfl:     fenofibrate (TRICOR) 145 MG tablet, Take 1 tablet by mouth Daily., Disp: , Rfl:     furosemide (LASIX) 80 MG tablet, Take 1 tablet by mouth Daily for 60 days., Disp: 30 tablet, Rfl: 1    gabapentin (NEURONTIN) 600 MG tablet, Take 1 tablet by mouth 3 (Three) Times a Day., Disp: 90 tablet, Rfl: 0    HYDROcodone-acetaminophen (NORCO)  MG per tablet, Take 1 tablet by mouth Every 8 (Eight) Hours As Needed for Severe Pain., Disp: 90 tablet, Rfl: 0    lisinopril (PRINIVIL,ZESTRIL) 5 MG tablet, Take 1 tablet by mouth Daily for 60 days., Disp: 30 tablet, Rfl: 1    Mucus Relief 600 MG 12 hr tablet, , Disp: , Rfl:     omeprazole (priLOSEC) 20 MG capsule, Take 1 capsule by mouth Daily., Disp: , Rfl:     rivaroxaban (XARELTO) 20 MG tablet, Take 1 tablet by mouth Daily., Disp: , Rfl:     rosuvastatin (CRESTOR) 10 MG tablet, Take 1 tablet by mouth Daily., Disp: , Rfl:     tamsulosin (FLOMAX) 0.4 MG capsule 24 hr capsule, Take 1 capsule by mouth Daily., Disp: , Rfl:     Trelegy Ellipta 200-62.5-25 MCG/ACT aerosol powder , Inhale 1 puff Daily., Disp: , Rfl:     triazolam (HALCION) 0.25 MG tablet, , Disp: , Rfl:     Review of Systems   Constitutional: Positive for fatigue and malaise/fatigue. Negative for diaphoresis and unexpected weight change.   HENT:  Negative for congestion and nosebleeds.    Cardiovascular:  Positive for dyspnea on exertion ( chronic ) and leg swelling. Negative for chest pain, irregular heartbeat, near-syncope, orthopnea, palpitations, paroxysmal nocturnal dyspnea and syncope.   Respiratory:  Positive for shortness of breath (chronic). Negative for cough.    Hematologic/Lymphatic: Negative for bleeding problem. Bruises/bleeds easily.   Musculoskeletal:  Positive for back pain ( radiculopathy).   Gastrointestinal:  Negative for bloating and nausea.   Neurological:  Positive for paresthesias ( burning to BLE R>L ). Negative for  dizziness, focal weakness, headaches, light-headedness, loss of balance and weakness.   Psychiatric/Behavioral:  Negative for altered mental status.          ECG 12 Lead    Date/Time: 2/19/2024 3:28 PM  Performed by: Christelle Mohamud APRN    Authorized by: Christelle Mohamud APRN  Comparison: compared with previous ECG from 1/8/2024  Similar to previous ECG  Rhythm: atrial fibrillation  Rate: normal  BPM: 73  Conduction: conduction normal  Q waves: V2    QRS axis: right    Clinical impression: non-specific ECG         Objective:     Vitals reviewed.   Constitutional:       General: Awake. Not in acute distress.     Appearance: Well-developed, well-groomed, overweight and not in distress. Ill-appearing and chronically ill-appearing. Not diaphoretic.      Interventions: Nasal cannula in place.   Eyes:      General:         Right eye: No discharge.         Left eye: No discharge.   HENT:      Head: Normocephalic and atraumatic.    Mouth/Throat:      Pharynx: No oropharyngeal exudate.   Pulmonary:      Effort: Pulmonary effort is normal. No respiratory distress.      Breath sounds: Decreased air movement present. Decreased breath sounds present. No wheezing. No rhonchi. No rales.   Cardiovascular:      Normal rate. Irregularly irregular rhythm.      Murmurs: There is no murmur.      No gallop.  No rub.   Edema:     Peripheral edema absent.   Musculoskeletal: Normal range of motion.      Cervical back: Normal range of motion and neck supple. Skin:     General: Skin is warm and dry.   Neurological:      Mental Status: Alert, oriented to person, place, and time and oriented to person, place and time.   Psychiatric:         Attention and Perception: Attention normal.         Mood and Affect: Mood normal.         Speech: Speech normal.         Behavior: Behavior normal. Behavior is cooperative.         Thought Content: Thought content normal.         Cognition and Memory: Cognition normal.         Judgment: Judgment normal.  "    /68   Pulse 73   Resp 18   Ht 170.2 cm (67\")   Wt 73.5 kg (162 lb)   SpO2 97% Comment: 2L O2  BMI 25.37 kg/m²     Lab Review:       Results for orders placed during the hospital encounter of 11/16/23    Adult Transthoracic Echo Complete w/ Color, Spectral and Contrast if Necessary Per Protocol    Interpretation Summary    Left ventricular systolic function is mildly decreased. Left ventricular ejection fraction appears to be 41 - 45%.    Left ventricular wall thickness is consistent with mild concentric hypertrophy.    Moderately reduced right ventricular systolic function noted.    Biatrial enlargement is noted.    Mild mitral valve regurgitation is present.    Mild tricuspid valve regurgitation is present. Estimated right ventricular systolic pressure from tricuspid regurgitation is markedly elevated (>55 mmHg).    A small to moderate size circumferential pericardial effusion is noted with the larger amount of fluid seen posterior to the heart with no obvious echocardiographic signs of tamponade.        Assessment:          Diagnosis Plan   1. Chronic combined systolic and diastolic congestive heart failure        2. Permanent atrial fibrillation        3. Current use of long term anticoagulation        4. Essential hypertension        5. Mixed hyperlipidemia        6. Chronic respiratory failure with hypoxia, on home O2 therapy        7. Tobacco abuse                 Plan:       - Chronic systolic and diastolic heart failure: NYHA Class III - fatigue and shortness of breath at baseline due to other contributing factors - respiratory failure and COPD. He is currently managed on diuretic therapy. He had been on beta blocker therapy but it was felt due to his lung disease that avoiding increased doses of beta blocker therapy was ideal and he was started on diltiazem and digoxin by EP. His medication list is a bit confusing and its hard to discern what was started at discharge based off his dc summary " last fall. We will try and obtain list from his home health nurse. Certainly, we could choose cardioselective beta blockers to try and avoid issues with his COPD and add GDMT for his systolic failure. However, systolic function may improve with ongoing rate control. He is currently on lisinopril and lasix.     - Atrial fibrillation: Permanent. Long standing af with previous cardioversion and return of atrial fibrillation ar follow-up without change in symptoms so he has been managed from a rate control strategy moving forward. He remains anticoagulated with Xarelto and denies any bleeding issues.  He has had recent elevated heart rates during an inpatient hospital stay due to acute illnesses. Rate control medications were adjusted at that time and EP was consulted by his PCP provider. He has seen EP in the outpatient setting, but the patient has chosen to remain conservative with medical management at this time.     [id number] SCORE   [id number] Score: 3 (2/19/2024  2:54 PM)    - Anticoagulation: Continue Xarelto 20 mg daily with food for stroke risk reduction in the setting of persistent atrial fibrillation.    - Hypertension: Stable.  His blood pressure is well controlled. He follows with his PCP office routinely for management.     - Hyperlipidemia: The patient has been started on rosuvastatin 10 mg daily by his PCP office.  He has routine labs drawn at their facility.  He is tolerating this medication without complaints.     -Chronic respiratory failure: Currently on home oxygen.  Previously was using during periods of sleep but now tells me that he is wearing this most of the day.  He has chronic stable shortness of breath and actually states that he does not feel that he always needs his oxygen but has been wearing it at home.    -Tobacco abuse/etoh: Advised smoking cessation. He understands that his alcohol use may be contributing to his cardiomyopathy as well.       We will reach out to his primary home  health nurse for clarification of his medication list as there are multiple doses of diltiazem listed and I am uncertain as to what he is actually taking as his discharge summary at that hospitalization does not appear to be reconciled.  Follow-up in 3 months given recent decline in ejection fraction and routine monitoring for CHF.    Advance Care Planning   ACP discussion was held with the patient during this visit. Patient does not have an advance directive, information provided.      I attest that all portions of this note have been reviewed and updated to reflect the patient's current status.

## 2024-02-20 ENCOUNTER — TRANSCRIBE ORDERS (OUTPATIENT)
Dept: ADMINISTRATIVE | Facility: HOSPITAL | Age: 74
End: 2024-02-20
Payer: MEDICARE

## 2024-02-20 DIAGNOSIS — J84.10 PULMONARY FIBROSIS: Primary | ICD-10-CM

## 2024-03-20 ENCOUNTER — HOSPITAL ENCOUNTER (OUTPATIENT)
Dept: PULMONOLOGY | Facility: HOSPITAL | Age: 74
Discharge: HOME | End: 2024-03-20
Payer: OTHER MISCELLANEOUS

## 2024-03-20 DIAGNOSIS — J84.10 PULMONARY FIBROSIS: ICD-10-CM

## 2024-03-20 PROCEDURE — 94060 EVALUATION OF WHEEZING: CPT

## 2024-03-20 PROCEDURE — 94726 PLETHYSMOGRAPHY LUNG VOLUMES: CPT

## 2024-03-20 PROCEDURE — 94618 PULMONARY STRESS TESTING: CPT

## 2024-03-20 PROCEDURE — 94729 DIFFUSING CAPACITY: CPT

## 2024-03-20 RX ORDER — ALBUTEROL SULFATE 2.5 MG/3ML
2.5 SOLUTION RESPIRATORY (INHALATION) ONCE
Status: COMPLETED | OUTPATIENT
Start: 2024-03-20 | End: 2024-03-20

## 2024-03-20 RX ADMIN — ALBUTEROL SULFATE 2.5 MG: 2.5 SOLUTION RESPIRATORY (INHALATION) at 09:33

## 2024-03-20 NOTE — PROCEDURES
Exercise Oximetry    Patient Name:Burt Mcdonald   MRN: [id number]   Date: 03/20/24             ROOM AIR BASELINE   SpO2% 93   Heart Rate 74   Blood Pressure 119/51     EXERCISE ON ROOM AIR SpO2% EXERCISE ON O2 @  LPM SpO2%   1 MINUTE 93 1 MINUTE    2 MINUTES 90 2 MINUTES    3 MINUTES 90 3 MINUTES    4 MINUTES 92 4 MINUTES    5 MINUTES 93 5 MINUTES    6 MINUTES 93 6 MINUTES               Distance Walked  600 feet Distance Walked   Dyspnea (Adriana Scale)  6 Dyspnea (Adriana Scale)   Fatigue (Adriana Scale)  6 Fatigue (Adriana Scale)   SpO2% Post Exercise  92 SpO2% Post Exercise   HR Post Exercise  77 HR Post Exercise   Time to Recovery  6 minutes Time to Recovery     Comments: Patient had to take 2 rests leaning against wall during walk due to back/leg pain.

## 2024-05-23 ENCOUNTER — OFFICE VISIT (OUTPATIENT)
Dept: CARDIOLOGY | Facility: CLINIC | Age: 74
End: 2024-05-23
Payer: MEDICARE

## 2024-05-23 VITALS
WEIGHT: 163 LBS | DIASTOLIC BLOOD PRESSURE: 58 MMHG | OXYGEN SATURATION: 95 % | HEIGHT: 67 IN | SYSTOLIC BLOOD PRESSURE: 120 MMHG | BODY MASS INDEX: 25.58 KG/M2 | HEART RATE: 72 BPM

## 2024-05-23 DIAGNOSIS — Z79.01 CURRENT USE OF LONG TERM ANTICOAGULATION: Chronic | ICD-10-CM

## 2024-05-23 DIAGNOSIS — I10 ESSENTIAL HYPERTENSION: Chronic | ICD-10-CM

## 2024-05-23 DIAGNOSIS — I50.42 CHRONIC COMBINED SYSTOLIC AND DIASTOLIC CONGESTIVE HEART FAILURE: Primary | Chronic | ICD-10-CM

## 2024-05-23 DIAGNOSIS — Z72.0 TOBACCO ABUSE: Chronic | ICD-10-CM

## 2024-05-23 DIAGNOSIS — Z99.81 CHRONIC RESPIRATORY FAILURE WITH HYPOXIA, ON HOME O2 THERAPY: Chronic | ICD-10-CM

## 2024-05-23 DIAGNOSIS — J96.11 CHRONIC RESPIRATORY FAILURE WITH HYPOXIA, ON HOME O2 THERAPY: Chronic | ICD-10-CM

## 2024-05-23 DIAGNOSIS — I48.21 PERMANENT ATRIAL FIBRILLATION: Chronic | ICD-10-CM

## 2024-05-23 DIAGNOSIS — F10.10 ETOH ABUSE: Chronic | ICD-10-CM

## 2024-05-23 PROCEDURE — 3078F DIAST BP <80 MM HG: CPT | Performed by: NURSE PRACTITIONER

## 2024-05-23 PROCEDURE — 3074F SYST BP LT 130 MM HG: CPT | Performed by: NURSE PRACTITIONER

## 2024-05-23 PROCEDURE — 99214 OFFICE O/P EST MOD 30 MIN: CPT | Performed by: NURSE PRACTITIONER

## 2024-05-23 PROCEDURE — 1159F MED LIST DOCD IN RCRD: CPT | Performed by: NURSE PRACTITIONER

## 2024-05-23 PROCEDURE — 1160F RVW MEDS BY RX/DR IN RCRD: CPT | Performed by: NURSE PRACTITIONER

## 2024-05-23 NOTE — PROGRESS NOTES
Subjective:     Encounter Date: 05/23/2024      Patient ID: Burt Mcdonald is a 73 y.o. male with known persistent atrial fibrillation with long-term anticoagulation, hypertension, COPD, chronic hypoxic respiratory failure with home oxygen, chronic systolic and diastolic heart failure, and ongoing tobacco and EtOH use.  He presents to the office today for follow up.     Chief Complaint: CHF/AF Follow Up   Atrial Fibrillation  Presents for follow-up visit. Symptoms include shortness of breath (chronic). Symptoms are negative for bradycardia, chest pain, dizziness, hemodynamic instability, hypertension, hypotension, palpitations, syncope, tachycardia and weakness. The symptoms have been stable. Past medical history includes atrial fibrillation and CHF. There are no medication compliance problems.   Congestive Heart Failure  Presents for follow-up visit. Associated symptoms include fatigue and shortness of breath (chronic). Pertinent negatives include no chest pain, chest pressure, edema, near-syncope, palpitations or unexpected weight change. The symptoms have been stable. Compliance with total regimen is 51-75%. Compliance problems include adherence to exercise.  Compliance with diet is 51-75%. Compliance with exercise is 26-50%. Compliance with medications is %.     The patient was evaluated in our office on 5/24/2021 by Dr. Nick Snider.  He was referred to our office due to the finding of atrial fibrillation by his primary care physician. He had been started on anticoagulation by his PCP. Due to the new finding, unknown chronicity, the patient was set up for outpatient cardioversion. Prior to his office appointment he was placed in a Holter monitor for surveillance. He remained in atrial fibrillation throughout the entirety of the monitoring period.    On 6/2/2021 the patient had successful cardioversion. He displayed a sinus rhythm post cardioversion. He was discharged home in stable condition.  He denies any changes in symptoms. He has a history of hypertension and COPD with ongoing tobacco abuse with mild dyspnea on exertion. His shortness of breath is chronic due to his lung history. He denied any other symptoms such as lightheadedness, dizziness, chest pain, palpitations. Post cardioversion the patient denied any change in his symptoms.    At the follow-up in July 2021 he was found to be back in atrial fibrillation on EKG and was stable and asymptomatic.  Since he had no reported symptoms that changed after cardioversion (and was feeling well without complaints), it was felt that rate control and anticoagulation was the best strategy.  He has been stable with routine outpatient follow ups.    He was hospitalized in November 2023. He was brought to the ED after increased weakness and confusion at home as well as shortness of breath. He was found to be in atrial fibrillation (baseline) with RVR and was admitted for treatment of COPD and pneumonia. During his hospital stay he was evaluated by electrophysiology. During his hospital stay medication adjustments were made for improvement of rate control. His beta blocker was stopped - due to his chronic lung disease and diltiazem was started. He was also started on digoxin. An echo was obtained, noting a slight drop in LVEF to 41-45%.  It was felt that his lung disease and acute illness were contributory to his elevated heart rates, but that his AF and current respiratory illness be contributing to his heart failure symptoms.  Also, it was discussed that long term he may continue to have issues with heart failure symptoms and worsening LVEF if he has ongoing bouts of uncontrolled heart rates. Therefore, other options, such as ablation, AVN ablation, and device implant were discussed with the patient.     He has seen EP in the outpatient setting to further discuss these options as he did not remember those discussions as in inpatient. He has elected to continue  to pursue rate control and medication management of heart failure at this time.     He presents today for routine follow up.     He reports to be stable. He has been following with his primary care doctor routinely. He has home health that checks on him as well. His biggest complaint is back pain. He has associated radiculopathy and pain with standing and walking. He was recently referred to pain management for evaluation. He has tried PT in the past and recently discussed this again as well. He feels better in terms of strength but does not feel that his pain is reduced with this.     He reports compliance with his home medications. He has no complaints uncontrolled rates that he is aware of. He is compliant with medications. He is on digoxin and diltiazem for rate control. He is managed on Xarelto for anticoagulation.     He is on oxygen at home most of the time. He has diuretics he takes for volume management. He has no chest pain chest tightness or chest pressure. He has dyspnea with exertion but no labored breathing with sitting or walking short distances.     The following portions of the patient's history were reviewed and updated as appropriate: allergies, current medications, past family history, past medical history, past social history and past surgical history.     No Known Allergies      Current Outpatient Medications:     albuterol sulfate  (90 Base) MCG/ACT inhaler, Inhale 2 puffs Every 4 (Four) Hours As Needed for Wheezing or Shortness of Air., Disp: , Rfl:     digoxin (LANOXIN) 125 MCG tablet, Take 1 tablet by mouth Daily., Disp: 30 tablet, Rfl: 1    dilTIAZem CD (CARDIZEM CD) 360 MG 24 hr capsule, Take 1 capsule by mouth Daily for 60 days., Disp: 30 capsule, Rfl: 1    DULoxetine (CYMBALTA) 60 MG capsule, Take 1 capsule by mouth Daily., Disp: , Rfl:     fenofibrate (TRICOR) 145 MG tablet, Take 1 tablet by mouth Daily., Disp: , Rfl:     furosemide (LASIX) 80 MG tablet, Take 1 tablet by mouth  Daily for 60 days., Disp: 30 tablet, Rfl: 1    gabapentin (NEURONTIN) 600 MG tablet, Take 1 tablet by mouth 3 (Three) Times a Day., Disp: 90 tablet, Rfl: 0    HYDROcodone-acetaminophen (NORCO)  MG per tablet, Take 1 tablet by mouth Every 8 (Eight) Hours As Needed for Severe Pain., Disp: 90 tablet, Rfl: 0    lisinopril (PRINIVIL,ZESTRIL) 5 MG tablet, Take 1 tablet by mouth Daily for 60 days., Disp: 30 tablet, Rfl: 1    Mucus Relief 600 MG 12 hr tablet, , Disp: , Rfl:     omeprazole (priLOSEC) 20 MG capsule, Take 1 capsule by mouth Daily., Disp: , Rfl:     rivaroxaban (XARELTO) 20 MG tablet, Take 1 tablet by mouth Daily., Disp: , Rfl:     rosuvastatin (CRESTOR) 10 MG tablet, Take 1 tablet by mouth Daily., Disp: , Rfl:     tamsulosin (FLOMAX) 0.4 MG capsule 24 hr capsule, Take 1 capsule by mouth Daily., Disp: , Rfl:     Trelegy Ellipta 200-62.5-25 MCG/ACT aerosol powder , Inhale 1 puff Daily., Disp: , Rfl:     triazolam (HALCION) 0.25 MG tablet, , Disp: , Rfl:     Review of Systems   Constitutional: Positive for fatigue and malaise/fatigue. Negative for diaphoresis and unexpected weight change.   HENT:  Negative for congestion and nosebleeds.    Cardiovascular:  Positive for dyspnea on exertion ( chronic ) and leg swelling. Negative for chest pain, irregular heartbeat, near-syncope, orthopnea, palpitations, paroxysmal nocturnal dyspnea and syncope.   Respiratory:  Positive for shortness of breath (chronic). Negative for cough and wheezing.    Hematologic/Lymphatic: Negative for bleeding problem. Bruises/bleeds easily.   Musculoskeletal:  Positive for back pain ( radiculopathy) and stiffness.   Gastrointestinal:  Negative for bloating and nausea.   Neurological:  Positive for paresthesias ( burning to BLE R>L ). Negative for dizziness, focal weakness, headaches, light-headedness, loss of balance and weakness.   Psychiatric/Behavioral:  Negative for altered mental status.        Procedures   Objective:     Vitals  "reviewed.   Constitutional:       General: Awake. Not in acute distress.     Appearance: Well-developed, well-groomed, overweight and not in distress. Ill-appearing and chronically ill-appearing. Not diaphoretic.      Interventions: Nasal cannula in place.   Eyes:      General:         Right eye: No discharge.         Left eye: No discharge.   HENT:      Head: Normocephalic and atraumatic.    Mouth/Throat:      Pharynx: No oropharyngeal exudate.   Pulmonary:      Effort: Pulmonary effort is normal. No respiratory distress.      Breath sounds: Decreased air movement present. Decreased breath sounds present. No wheezing. No rhonchi. No rales.   Cardiovascular:      Normal rate. Irregularly irregular rhythm.      Murmurs: There is no murmur.      No gallop.  No rub.   Edema:     Peripheral edema absent.   Musculoskeletal: Normal range of motion.      Cervical back: Normal range of motion and neck supple. Skin:     General: Skin is warm and dry.   Neurological:      Mental Status: Alert, oriented to person, place, and time and oriented to person, place and time.   Psychiatric:         Attention and Perception: Attention normal.         Mood and Affect: Mood normal.         Speech: Speech normal.         Behavior: Behavior normal. Behavior is cooperative.         Thought Content: Thought content normal.         Cognition and Memory: Cognition normal.         Judgment: Judgment normal.     /58   Pulse 72   Ht 170.2 cm (67\")   Wt 73.9 kg (163 lb)   SpO2 95%   BMI 25.53 kg/m²     Lab Review:       Results for orders placed during the hospital encounter of 11/16/23    Adult Transthoracic Echo Complete w/ Color, Spectral and Contrast if Necessary Per Protocol    Interpretation Summary    Left ventricular systolic function is mildly decreased. Left ventricular ejection fraction appears to be 41 - 45%.    Left ventricular wall thickness is consistent with mild concentric hypertrophy.    Moderately reduced right " ventricular systolic function noted.    Biatrial enlargement is noted.    Mild mitral valve regurgitation is present.    Mild tricuspid valve regurgitation is present. Estimated right ventricular systolic pressure from tricuspid regurgitation is markedly elevated (>55 mmHg).    A small to moderate size circumferential pericardial effusion is noted with the larger amount of fluid seen posterior to the heart with no obvious echocardiographic signs of tamponade.        Assessment:          Diagnosis Plan   1. Chronic combined systolic and diastolic congestive heart failure        2. Permanent atrial fibrillation        3. Current use of long term anticoagulation        4. Essential hypertension        5. Chronic respiratory failure with hypoxia, on home O2 therapy        6. Tobacco abuse        7. ETOH abuse                   Plan:       - Chronic systolic and diastolic heart failure: NYHA Class III - fatigue and shortness of breath at baseline due to other contributing factors - respiratory failure and COPD. He is currently managed on diuretic therapy. He had been on beta blocker therapy but it was felt due to his lung disease that avoiding beta blocker therapy was ideal and he was started on diltiazem and digoxin by EP.  He is currently on lisinopril and lasix. He is euvolemic on exam today. No changes at this time.     - Atrial fibrillation: Permanent. Long standing af with previous cardioversion and return of atrial fibrillation ar follow-up without change in symptoms so he has been managed from a rate control strategy moving forward. He remains anticoagulated with Xarelto and denies any bleeding issues.  He has had recent elevated heart rates during an inpatient hospital stay due to acute illnesses. Rate control medications were adjusted at that time and EP was consulted by his PCP provider. He has seen EP in the outpatient setting, but the patient has chosen to remain conservative with medical management at this  time. He continues to feel stable without further issues of known elevated heart rates. He does not currently have a follow up with EP as this was canceled by our office it appears. We will have this rescheduled to follow up on management of AF and plans for further procedures, if necessary.     [id number] SCORE   [id number] Score: 3 (5/27/2024  2:07 PM)      - Anticoagulation: Continue Xarelto 20 mg daily with food for stroke risk reduction in the setting of persistent atrial fibrillation.    - Hypertension: Stable.  His blood pressure is well controlled. He follows with his PCP office routinely for management.     - Hyperlipidemia: The patient has been started on rosuvastatin 10 mg daily by his PCP office.  He has routine labs drawn at their facility.  He is tolerating this medication without complaints.     -Chronic respiratory failure: Currently on home oxygen.  Previously was using during periods of sleep but now tells me that he is wearing this most of the day.  He has chronic stable shortness of breath.     -Tobacco abuse/etoh: Advised smoking cessation. He understands that his alcohol use may be contributing to his cardiomyopathy as well.     Continue current therapies  Follow up with EP for routine follow up  Monitor volume status and call for worsening swelling.   3 month CHF follow up.       Advance Care Planning   ACP discussion was held with the patient during this visit. Patient does not have an advance directive, information provided.

## 2024-08-05 ENCOUNTER — LAB (OUTPATIENT)
Dept: LAB | Facility: HOSPITAL | Age: 74
End: 2024-08-05
Payer: MEDICARE

## 2024-08-05 ENCOUNTER — OFFICE VISIT (OUTPATIENT)
Dept: CARDIOLOGY | Facility: CLINIC | Age: 74
End: 2024-08-05
Payer: MEDICARE

## 2024-08-05 VITALS
SYSTOLIC BLOOD PRESSURE: 128 MMHG | HEART RATE: 60 BPM | WEIGHT: 155 LBS | DIASTOLIC BLOOD PRESSURE: 58 MMHG | BODY MASS INDEX: 24.33 KG/M2 | OXYGEN SATURATION: 82 % | HEIGHT: 67 IN

## 2024-08-05 DIAGNOSIS — J96.11 CHRONIC RESPIRATORY FAILURE WITH HYPOXIA, ON HOME O2 THERAPY: Chronic | ICD-10-CM

## 2024-08-05 DIAGNOSIS — I48.21 PERMANENT ATRIAL FIBRILLATION: Chronic | ICD-10-CM

## 2024-08-05 DIAGNOSIS — I48.11 LONGSTANDING PERSISTENT ATRIAL FIBRILLATION: Primary | ICD-10-CM

## 2024-08-05 DIAGNOSIS — Z79.01 CURRENT USE OF LONG TERM ANTICOAGULATION: Chronic | ICD-10-CM

## 2024-08-05 DIAGNOSIS — F10.10 ETOH ABUSE: Chronic | ICD-10-CM

## 2024-08-05 DIAGNOSIS — Z99.81 CHRONIC RESPIRATORY FAILURE WITH HYPOXIA, ON HOME O2 THERAPY: Chronic | ICD-10-CM

## 2024-08-05 DIAGNOSIS — I48.11 LONGSTANDING PERSISTENT ATRIAL FIBRILLATION: ICD-10-CM

## 2024-08-05 LAB
ANION GAP SERPL CALCULATED.3IONS-SCNC: 8 MMOL/L (ref 5–15)
BUN SERPL-MCNC: 24 MG/DL (ref 8–23)
BUN/CREAT SERPL: 33.8 (ref 7–25)
CALCIUM SPEC-SCNC: 8.8 MG/DL (ref 8.6–10.5)
CHLORIDE SERPL-SCNC: 97 MMOL/L (ref 98–107)
CO2 SERPL-SCNC: 35 MMOL/L (ref 22–29)
CREAT SERPL-MCNC: 0.71 MG/DL (ref 0.76–1.27)
DIGOXIN SERPL-MCNC: 1.1 NG/ML (ref 0.6–1.2)
EGFRCR SERPLBLD CKD-EPI 2021: 96.9 ML/MIN/1.73
GLUCOSE SERPL-MCNC: 98 MG/DL (ref 65–99)
POTASSIUM SERPL-SCNC: 4.6 MMOL/L (ref 3.5–5.2)
SODIUM SERPL-SCNC: 140 MMOL/L (ref 136–145)

## 2024-08-05 PROCEDURE — 36415 COLL VENOUS BLD VENIPUNCTURE: CPT

## 2024-08-05 PROCEDURE — 80162 ASSAY OF DIGOXIN TOTAL: CPT

## 2024-08-05 PROCEDURE — 80048 BASIC METABOLIC PNL TOTAL CA: CPT

## 2024-08-05 NOTE — PROGRESS NOTES
"[facility] -  CLINIC FOLLOW UP     Patient Care Team:  Arjun Roman MD as PCP - General (Family Medicine)  Fabiano Saldaña MD as Consulting Physician (Urology)  Nick Snider MD as Cardiologist (Cardiology)  Arjun Roman MD as Referring Physician (Family Medicine)    Chief Complaint: follow up on afib     Subjective   EP Problems:  1.  Permanent atrial fibrillation     Cardiology Problems:  1.  Hypertension  2.  Chronic systolic heart failure  3.  Moderate pericardial effusion  4.  Likely pulmonary hypertension     Medical Problems:  1.  COPD on home oxygen, severe  2.  Tobacco use disorder  3.  Alcohol use disorder  4.  Lumbar stenosis      HPI: Today I had the pleasure of seeing Burt Mcdonald in the cardiology clinic for follow up. He is a 73 year old male with a history of atrial fibrillation with heart failure exacerbations. He is followed by cardiology for chronic systolic and diastolic heart failure, ongoing tobacco use, ETOH use.  He was previously scheduled for CRT-P implant and AVN ablation last year, but this was not performed per patient preference. In January, his afib was rate controlled and he was not having ongoing heart failure exacerbations.     Today he denies any cardiac complaints. He has some lower extremity swelling since he did not take his lasix today. His RLE is swollen and dressing removed from skin biopsy the other day by Dr. Roman. The punch biopsy is draining and has some bleeding still. No erythema or cellulitis. Removed dressing, cleaned the area and redressed with clean 2x2s.      He is on 2L of oxygen. He drinks Gin and sundrop mainly, a 1.75mL bottle will last a few weeks. He states he will see PCP this October and likely have repeat labs. Reviewed PFTs from March showed severe obstructive defect.       Objective     Visit Vitals  /58   Pulse 60   Ht 170.2 cm (67.01\")   Wt 70.3 kg (155 lb)   SpO2 (!) 82%   BMI 24.27 kg/m² "           Vitals reviewed.   Constitutional:       Appearance: Healthy appearance. Not in distress.   Eyes:      Extraocular Movements: Extraocular movements intact.      Conjunctiva/sclera: Conjunctivae normal.      Pupils: Pupils are equal, round, and reactive to light.   HENT:      Head: Normocephalic and atraumatic.      Nose: Nose normal.    Mouth/Throat:      Lips: Pink.      Mouth: Mucous membranes are moist.      Pharynx: Oropharynx is clear.   Neck:      Vascular: No carotid bruit or JVD. JVD normal.   Pulmonary:      Effort: Pulmonary effort is normal.      Breath sounds: Normal breath sounds.   Chest:      Chest wall: Not tender to palpatation.   Cardiovascular:      PMI at left midclavicular line. Normal rate. Irregularly irregular rhythm. Normal S1. Normal S2.       Murmurs: There is no murmur.      No gallop.  No rub.   Pulses:     Radial: 2+ bilaterally.  Edema:     Peripheral edema absent.   Abdominal:      General: Bowel sounds are normal.      Palpations: Abdomen is soft.   Musculoskeletal: Normal range of motion.      Extremities: No clubbing present.     Cervical back: Normal range of motion. Skin:     General: Skin is warm and dry.      Comments: RLE medial ankle biopsy  Quarter sized lesion with rolled ridges, clearing center s/p punch biopsy, poor healing.    Neurological:      General: No focal deficit present.      Mental Status: Alert and oriented to person, place, and time.   Psychiatric:         Attention and Perception: Attention normal.         Mood and Affect: Affect normal.         Speech: Speech normal.         Behavior: Behavior normal.         Cognition and Memory: Cognition normal.             The following portions of the patient's history were reviewed and updated as appropriate: allergies, current medications, past medical history, past social history, past and problem list.     Review of Systems   Constitutional: Negative.    HENT: Negative.     Eyes: Negative.    Respiratory:  Negative.     Cardiovascular: Negative.    Gastrointestinal: Negative.    Endocrine: Negative.    Genitourinary: Negative.    Musculoskeletal: Negative.    Skin: Negative.         RLE skin biopsy, medial ankle.    Allergic/Immunologic: Negative.    Neurological: Negative.    Hematological: Negative.    Psychiatric/Behavioral: Negative.         Complete PFT - Pre & Post Bronchodilator (03/20/2024 10:24)    Interpretation :  1.  Spirometry is consistent with a severe obstructive ventilatory defect.  2.  There is slight improvement in spirometry postbronchodilator but a severe obstructive ventilatory defect is still present.  3.  Lung volumes reveal hyperinflation and a decrease in vital capacity secondary to obstruction.  There is also a decrease in inspiratory capacity.  4.  There is a moderate diffusion impairment which when corrected for alveolar volume is a mild diffusion impairment.  5.  When current studies are compared to studies from June 25, 2021, there has been a significant decline in the patient's pre and postbronchodilator spirometry compared to previous baseline values.  There has been a slight but not significant drop in total lung capacity compared to previous.  When corrected for alveolar volume, there has been a slight drop in diffusion capacity compared to previous.        Olaf Murcia MD      ECG 12 Lead    Date/Time: 8/5/2024 10:08 AM  Performed by: Oumou Olsen PA    Authorized by: Oumou Olsen PA  Comparison: compared with previous ECG from 2/19/2024  Rhythm: atrial fibrillation  Rate: normal  BPM: 87  QRS axis: normal  Other findings: non-specific ST-T wave changes            Medication Review: yes    Current Outpatient Medications:     albuterol sulfate  (90 Base) MCG/ACT inhaler, Inhale 2 puffs Every 4 (Four) Hours As Needed for Wheezing or Shortness of Air., Disp: , Rfl:     digoxin (LANOXIN) 125 MCG tablet, Take 1 tablet by mouth Daily., Disp: 30 tablet, Rfl: 1     DULoxetine (CYMBALTA) 60 MG capsule, Take 1 capsule by mouth Daily., Disp: , Rfl:     fenofibrate (TRICOR) 145 MG tablet, Take 1 tablet by mouth Daily., Disp: , Rfl:     gabapentin (NEURONTIN) 600 MG tablet, Take 1 tablet by mouth 3 (Three) Times a Day., Disp: 90 tablet, Rfl: 0    HYDROcodone-acetaminophen (NORCO)  MG per tablet, Take 1 tablet by mouth Every 8 (Eight) Hours As Needed for Severe Pain., Disp: 90 tablet, Rfl: 0    Mucus Relief 600 MG 12 hr tablet, , Disp: , Rfl:     omeprazole (priLOSEC) 20 MG capsule, Take 1 capsule by mouth Daily., Disp: , Rfl:     rivaroxaban (XARELTO) 20 MG tablet, Take 1 tablet by mouth Daily., Disp: , Rfl:     rosuvastatin (CRESTOR) 10 MG tablet, Take 1 tablet by mouth Daily., Disp: , Rfl:     tamsulosin (FLOMAX) 0.4 MG capsule 24 hr capsule, Take 1 capsule by mouth Daily., Disp: , Rfl:     Trelegy Ellipta 200-62.5-25 MCG/ACT aerosol powder , Inhale 1 puff Daily., Disp: , Rfl:     triazolam (HALCION) 0.25 MG tablet, , Disp: , Rfl:     dilTIAZem CD (CARDIZEM CD) 360 MG 24 hr capsule, Take 1 capsule by mouth Daily for 60 days., Disp: 30 capsule, Rfl: 1    furosemide (LASIX) 80 MG tablet, Take 1 tablet by mouth Daily for 60 days., Disp: 30 tablet, Rfl: 1    lisinopril (PRINIVIL,ZESTRIL) 5 MG tablet, Take 1 tablet by mouth Daily for 60 days., Disp: 30 tablet, Rfl: 1   No Known Allergies    I have reviewed       CBC:  Lab Results - Last 18 Months   Lab Units 11/28/23  0715   WBC 10*3/mm3 5.52   HEMOGLOBIN g/dL 12.9*   HEMATOCRIT % 43.9   PLATELETS 10*3/mm3 262      BMP/CMP:  Lab Results - Last 18 Months   Lab Units 11/28/23  0715   SODIUM mmol/L 142   POTASSIUM mmol/L 4.4   CHLORIDE mmol/L 103   CO2 mmol/L 35.0*   GLUCOSE mg/dL 89   BUN mg/dL 18   CREATININE mg/dL 0.67*   CALCIUM mg/dL 9.2     BNP:   Lab Results - Last 18 Months   Lab Units 11/20/23  0521   PROBNP pg/mL 612.4      THYROID:  Lab Results - Last 18 Months   Lab Units 11/28/23  0715 11/16/23  1107   TSH uIU/mL  4.440* 1.360   FREE T4 ng/dL  --  1.08       Results for orders placed during the hospital encounter of 11/16/23    Adult Transthoracic Echo Complete w/ Color, Spectral and Contrast if Necessary Per Protocol    Interpretation Summary    Left ventricular systolic function is mildly decreased. Left ventricular ejection fraction appears to be 41 - 45%.    Left ventricular wall thickness is consistent with mild concentric hypertrophy.    Moderately reduced right ventricular systolic function noted.    Biatrial enlargement is noted.    Mild mitral valve regurgitation is present.    Mild tricuspid valve regurgitation is present. Estimated right ventricular systolic pressure from tricuspid regurgitation is markedly elevated (>55 mmHg).    A small to moderate size circumferential pericardial effusion is noted with the larger amount of fluid seen posterior to the heart with no obvious echocardiographic signs of tamponade.     Assessment:   Diagnoses and all orders for this visit:    1. Longstanding persistent atrial fibrillation (Primary)  -     ECG 12 Lead; Future  -     ECG 12 Lead  -     Digoxin Level; Future  -     Basic Metabolic Panel; Future    2. Permanent atrial fibrillation    3. ETOH abuse    4. Chronic respiratory failure with hypoxia, on home O2 therapy    5. Current use of long term anticoagulation      Chronic afib: rate controlled still on Diltiazem and digoxin. Will check BMP today and digoxin level. Chronically anticoagulated with xarelto   -Discussed possibility with AVN ablation PPM implant, but he feels that he is stable.     ETOH use: Discussed importance of cessation in regards to neuropathy, chronic systolic heart failure, debility, hepatic function.     Tobacco: Reviewed PFTs with patient with severe COPD. Recheck of O2 sat at rest with oxygen was 95%.     HFrEF: EF 41-45% on last echo. Currently on Lasix and lisinopril. Rate controlled with dig and diltiazem. Beta blocker would be ideal to use given  his reduced EF.   -Recommend rechecking echo and if EF has declined, then will have to consider change to beta blocker and optimization of meds further.   -Has cardiology follow up this month.       I spent 30 minutes caring for Don on this date of service. This time includes time spent by me in the following activities:preparing for the visit, reviewing tests, obtaining and/or reviewing a separately obtained history, performing a medically appropriate examination and/or evaluation , counseling and educating the patient/family/caregiver, ordering medications, tests, or procedures, referring and communicating with other health care professionals , documenting information in the medical record, independently interpreting results and communicating that information with the patient/family/caregiver, and care coordination        Electronically signed by JAMMIE Malik

## 2024-08-19 ENCOUNTER — HOSPITAL ENCOUNTER (OUTPATIENT)
Dept: GENERAL RADIOLOGY | Facility: HOSPITAL | Age: 74
Discharge: HOME | End: 2024-08-19
Payer: MEDICARE

## 2024-08-19 ENCOUNTER — OFFICE VISIT (OUTPATIENT)
Dept: NEUROSURGERY | Facility: CLINIC | Age: 74
End: 2024-08-19
Payer: MEDICARE

## 2024-08-19 VITALS — WEIGHT: 161.9 LBS | BODY MASS INDEX: 25.41 KG/M2 | HEIGHT: 67 IN

## 2024-08-19 DIAGNOSIS — M79.604 BILATERAL LEG PAIN: ICD-10-CM

## 2024-08-19 DIAGNOSIS — Z98.890 HISTORY OF LUMBAR LAMINECTOMY: ICD-10-CM

## 2024-08-19 DIAGNOSIS — Z72.0 TOBACCO ABUSE: ICD-10-CM

## 2024-08-19 DIAGNOSIS — M79.605 BILATERAL LEG PAIN: ICD-10-CM

## 2024-08-19 DIAGNOSIS — R20.2 NUMBNESS AND TINGLING OF BOTH LOWER EXTREMITIES: ICD-10-CM

## 2024-08-19 DIAGNOSIS — M41.50 DEGENERATIVE SCOLIOSIS: Primary | ICD-10-CM

## 2024-08-19 DIAGNOSIS — M54.50 LUMBAR BACK PAIN: ICD-10-CM

## 2024-08-19 DIAGNOSIS — R20.0 NUMBNESS AND TINGLING OF BOTH LOWER EXTREMITIES: ICD-10-CM

## 2024-08-19 DIAGNOSIS — M41.50 DEGENERATIVE SCOLIOSIS: ICD-10-CM

## 2024-08-19 DIAGNOSIS — E66.3 OVERWEIGHT: ICD-10-CM

## 2024-08-19 PROCEDURE — 1160F RVW MEDS BY RX/DR IN RCRD: CPT | Performed by: NURSE PRACTITIONER

## 2024-08-19 PROCEDURE — 72082 X-RAY EXAM ENTIRE SPI 2/3 VW: CPT

## 2024-08-19 PROCEDURE — 1159F MED LIST DOCD IN RCRD: CPT | Performed by: NURSE PRACTITIONER

## 2024-08-19 PROCEDURE — 99214 OFFICE O/P EST MOD 30 MIN: CPT | Performed by: NURSE PRACTITIONER

## 2024-08-19 PROCEDURE — 72120 X-RAY BEND ONLY L-S SPINE: CPT

## 2024-08-19 NOTE — PROGRESS NOTES
Primary Care Provider: Arjun Roman MD    Chief Complaint:   Chief Complaint   Patient presents with    Back Pain     PT is here with complaints of lumbar back pain, bilateral leg pain, numbness and tingling.        History of Present Illness    HPI:  Burt Mcdonald is a 73 y.o. male who presents today with his daughter with complaint of lumbar back and bilateral leg pain, 60% legs, 40% back.      History of multiple lumbar surgeries by Dr. Herring in 2015 and 2019.   In 2015 it appears he underwent placement of Coflex at L4-5, followed by removal of Coflex and bilateral hemilaminectomy, partial facetectomy and neural decompression at L3-4 in 2019 for treatment of lumbar back and right leg pain that improved but did not resolve postoperatively.    Gradual and progressive onset of lumbar back pain since 2019.  He currently complains of constant lumbar back pain, as well as intermittent pain to the lateral mid right thigh that extends to the lateral mid leg, radicular pain to the left leg in a nondermatomal distribution, as well as numbness, tingling, and burning dysesthesias to the bilateral feet in a stocking glove distribution from the ankle.  This discomfort worsens with attempting to lie flat in supine position, prolonged standing, walking, and with physical activity that requires lifting.  Minimal alleviation of his discomfort with position change, forward flexion, epidural injections (last injection July 18, 2024), and continued use of gabapentin and hydrocodone which he obtains from pain management.  He does not require assistance to ambulate and denies recent falls.  Mr. Mcdonald additionally denies fevers, chills, night sweats, unexplained weight loss, saddle anesthesia, or bowel or bladder dysfunction. He currently rates the severity of his symptoms 4/10.      Mr. Mcdonald completed a dedicated course of physician directed physical therapy in February 2024 with bio kinetics with minimal relief of his  symptoms.  He does not participate massage care or chiropractic care.  Routinely evaluated by Dr. Naqvi with pain management.    ROS  Review of Systems   Constitutional:  Positive for activity change, appetite change, fatigue and unexpected weight change.   HENT:  Positive for voice change.    Eyes: Negative.    Respiratory:  Positive for cough and shortness of breath.    Cardiovascular:  Positive for leg swelling.   Gastrointestinal: Negative.    Endocrine: Positive for cold intolerance and polyuria.   Genitourinary:  Positive for enuresis.   Musculoskeletal:  Positive for arthralgias, back pain and gait problem.   Skin:  Positive for color change and pallor.   Allergic/Immunologic: Negative.    Neurological:  Positive for weakness, numbness and headaches.   Hematological:  Bruises/bleeds easily.   Psychiatric/Behavioral: Negative.     All other systems reviewed and are negative.    Past Medical History:   Diagnosis Date    A-fib     COPD (chronic obstructive pulmonary disease)     Elevated PSA     Hypercholesteremia     Hypertension     Low back pain      Past Surgical History:   Procedure Laterality Date    BACK SURGERY      EYE SURGERY  01/2016    Bilateral cataract     Family History: family history includes Cancer in his father and mother; No Known Problems in his brother, brother, brother, and sister.    Social History:  reports that he has been smoking cigarettes. His smokeless tobacco use includes chew. He reports current alcohol use of about 3.0 standard drinks of alcohol per week. He reports that he does not use drugs.    Medications:    Current Outpatient Medications:     albuterol sulfate  (90 Base) MCG/ACT inhaler, Inhale 2 puffs Every 4 (Four) Hours As Needed for Wheezing or Shortness of Air., Disp: , Rfl:     digoxin (LANOXIN) 125 MCG tablet, Take 1 tablet by mouth Daily., Disp: 30 tablet, Rfl: 1    DULoxetine (CYMBALTA) 60 MG capsule, Take 1 capsule by mouth Daily., Disp: , Rfl:      "fenofibrate (TRICOR) 145 MG tablet, Take 1 tablet by mouth Daily., Disp: , Rfl:     gabapentin (NEURONTIN) 600 MG tablet, Take 1 tablet by mouth 3 (Three) Times a Day., Disp: 90 tablet, Rfl: 0    HYDROcodone-acetaminophen (NORCO)  MG per tablet, Take 1 tablet by mouth Every 8 (Eight) Hours As Needed for Severe Pain., Disp: 90 tablet, Rfl: 0    Mucus Relief 600 MG 12 hr tablet, , Disp: , Rfl:     omeprazole (priLOSEC) 20 MG capsule, Take 1 capsule by mouth Daily., Disp: , Rfl:     rivaroxaban (XARELTO) 20 MG tablet, Take 1 tablet by mouth Daily., Disp: , Rfl:     rosuvastatin (CRESTOR) 10 MG tablet, Take 1 tablet by mouth Daily., Disp: , Rfl:     tamsulosin (FLOMAX) 0.4 MG capsule 24 hr capsule, Take 1 capsule by mouth Daily., Disp: , Rfl:     Trelegy Ellipta 200-62.5-25 MCG/ACT aerosol powder , Inhale 1 puff Daily., Disp: , Rfl:     triazolam (HALCION) 0.25 MG tablet, , Disp: , Rfl:     dilTIAZem CD (CARDIZEM CD) 360 MG 24 hr capsule, Take 1 capsule by mouth Daily for 60 days., Disp: 30 capsule, Rfl: 1    furosemide (LASIX) 80 MG tablet, Take 1 tablet by mouth Daily for 60 days., Disp: 30 tablet, Rfl: 1    lisinopril (PRINIVIL,ZESTRIL) 5 MG tablet, Take 1 tablet by mouth Daily for 60 days., Disp: 30 tablet, Rfl: 1    Allergies:  Patient has no known allergies.    Objective   Ht 170.2 cm (67.01\")   Wt 73.4 kg (161 lb 14.4 oz)   BMI 25.35 kg/m²   Physical Exam  Vitals and nursing note reviewed.   Constitutional:       General: He is not in acute distress.     Appearance: Normal appearance. He is well-developed, well-groomed and overweight. He is not ill-appearing, toxic-appearing or diaphoretic.      Comments: BMI 25.35   HENT:      Head: Normocephalic and atraumatic.      Right Ear: Hearing normal.      Left Ear: Hearing normal.   Eyes:      General: Lids are normal.      Extraocular Movements: Extraocular movements intact.      Conjunctiva/sclera: Conjunctivae normal.      Pupils: Pupils are equal, round, " and reactive to light.   Neck:      Trachea: Trachea normal.   Cardiovascular:      Rate and Rhythm: Normal rate and regular rhythm.   Pulmonary:      Effort: Pulmonary effort is normal. No tachypnea, bradypnea, accessory muscle usage or respiratory distress.   Abdominal:      Palpations: Abdomen is soft.   Musculoskeletal:      Cervical back: Full passive range of motion without pain and neck supple.   Skin:     General: Skin is warm and dry.   Neurological:      GCS: GCS eye subscore is 4. GCS verbal subscore is 5. GCS motor subscore is 6.      Coordination: Coordination is intact.      Deep Tendon Reflexes:      Reflex Scores:       Tricep reflexes are 0 on the right side and 0 on the left side.       Bicep reflexes are 0 on the right side and 0 on the left side.       Brachioradialis reflexes are 0 on the right side and 0 on the left side.       Patellar reflexes are 1+ on the right side and 1+ on the left side.       Achilles reflexes are 0 on the right side and 0 on the left side.  Psychiatric:         Speech: Speech normal.         Behavior: Behavior normal. Behavior is cooperative.       Neurological Exam  Mental Status  Awake, alert and oriented to person, place and time. Speech is normal. Language is fluent with no aphasia. Attention and concentration are normal.    Cranial Nerves  CN I: Sense of smell is normal.  CN II: Visual acuity is normal.  CN III, IV, VI: Extraocular movements intact bilaterally. Normal lids and orbits bilaterally. Pupils equal round and reactive to light bilaterally.  CN V: Facial sensation is normal.  CN VII: Full and symmetric facial movement.  CN IX, X: Palate elevates symmetrically  CN XI: Shoulder shrug strength is normal.  CN XII: Tongue midline without atrophy or fasciculations.    Motor  Normal muscle bulk throughout. Normal muscle tone.                                               Right                     Left  Toe extension                        5                           5                                             Right                     Left  Deltoid                                   5                          5   Biceps                                   5                          5   Triceps                                  5                          5   Wrist extensor                       5                          5   Iliopsoas                               4+                          4+   Quadriceps                           5                          5   Anterior tibialis                      5                          5   Posterior tibialis                    5                          5    Sensory  Sensation is intact to light touch, pinprick, vibration and proprioception in all four extremities.    Reflexes                                            Right                      Left  Brachioradialis                    0                         0  Biceps                                 0                         0  Triceps                                0                         0  Patellar                                1+                         1+  Achilles                                0                         0  Right Plantar: downgoing  Left Plantar: downgoing    Right pathological reflexes: Leighton's absent. Ankle clonus absent.  Left pathological reflexes: Leighton's absent. Ankle clonus absent.    Coordination    Finger-to-nose, rapid alternating movements and heel-to-shin normal bilaterally without dysmetria.    Gait    Wide-based slightly kyphotic gait.    Imaging: (independent review and interpretation)  7/13/2024.  MRI of the lumbar spine shows no STIR signal changes suggest acute fractures, no bone marrow signal change, degenerative scoliosis of the lumbar spine with apex at L2-3, multilevel degenerative changes resulting in moderate thecal sac compression and bilateral foraminal stenosis at L2-3 and L3-4, thecal sac compression and right foraminal  stenosis at L4-5 and L5-S1.                      ASSESSMENT and PLAN  Burt Mcdonald is a 73 y.o. male with significant medical comorbidities to include multiple prior lumbar surgeries (Dr. Herring 2015, 2019), A-fib currently on Xarelto, alcohol abuse, COPD, hypertension, hyperlipidemia, elevated PSA, tobacco abuse, and he is overweight.   He presents with a new problem of lumbar back and bilateral leg pain, 60% legs, 40% back.   Physical exam findings of +4/5 bilateral IP weakness, grossly muted reflexes, and a wide-based slightly kyphotic gait.  His imaging shows degenerative scoliosis of the lumbar spine with apex at L2-3, multilevel degenerative changes resulting in moderate thecal sac compression and bilateral foraminal stenosis at L2-3 and L3-4, thecal sac compression and right foraminal stenosis at L4-5 and L5-S1.    RECOMMENDATIONS ...  Degenerative scoliosis  Lumbar back and bilateral leg pain and dysesthesias  History of multiple prior lumbar surgeries (Dr. Herring 2015, 2019)  For further evaluation we will proceed today by obtaining scoliosis x-rays, as well as 2-3 view lateral x-rays of the lumbar spine with flexion-extension to assess for instability to assess for areas of instability.  We also send them for an EMG and nerve conduction study of the bilateral lower extremities to confirm or refute radiculopathy from the lumbar spine and/or a peripheral neuropathy as a causative factor for their lower extremity dysesthesias.    CT of the lumbar spine per robot protocol, tentative for surgical planning.  Once all testing is complete we will have him follow-up with Dr. Wooten for reassessment and to discuss the need for surgical intervention.  I advised the patient to call to return sooner for new or worsening complaints of weakness, paresthesias, gait disturbances, or any additional concerns.  Treatment options discussed in detail with Burt and they voiced understanding and agree with this plan of  care.    Tobacco abuse  The patient understands the many dangers of continuing to use tobacco.  If quitting becomes an increased priority Don knows to contact us for help with quitting.       Overweight (BMI 25.0-29.9)  Body mass index is 25.35 kg/m²..  Information on the DASH diet provided in the AVS.  We will continue to provided diet and exercise information with the goal of weight loss at each scheduled appointment.     [facility] Fall Risk Assessment was completed, and patient is at HIGH risk for falls. Assessment completed on:8/19/2024  Fall risk information provided in AVS.    Diagnoses and all orders for this visit:    1. Degenerative scoliosis (Primary)  -     XR Spine Scoliosis 2 or 3 Views; Future  -     CT Lumbar Spine Without Contrast; Future    2. Lumbar back pain  -     XR Spine Lumbar Flex & Ext; Future  -     CT Lumbar Spine Without Contrast; Future    3. Bilateral leg pain  -     EMG & Nerve Conduction Test; Future    4. Numbness and tingling of both lower extremities  -     EMG & Nerve Conduction Test; Future    5. History of lumbar laminectomy  -     CT Lumbar Spine Without Contrast; Future    6. Tobacco abuse    7. Overweight      Return for Follow-up with Dr. Wooten at next avaliable.    Thank you for this Consultation and the opportunity to participate in Don's care.    Sincerely,  VIVIANA Suggs

## 2024-08-26 ENCOUNTER — OFFICE VISIT (OUTPATIENT)
Dept: CARDIOLOGY | Facility: CLINIC | Age: 74
End: 2024-08-26
Payer: MEDICARE

## 2024-08-26 VITALS
OXYGEN SATURATION: 92 % | WEIGHT: 165 LBS | DIASTOLIC BLOOD PRESSURE: 60 MMHG | HEART RATE: 71 BPM | BODY MASS INDEX: 25.9 KG/M2 | HEIGHT: 67 IN | SYSTOLIC BLOOD PRESSURE: 119 MMHG

## 2024-08-26 DIAGNOSIS — J96.11 CHRONIC RESPIRATORY FAILURE WITH HYPOXIA, ON HOME O2 THERAPY: Chronic | ICD-10-CM

## 2024-08-26 DIAGNOSIS — Z79.01 CURRENT USE OF LONG TERM ANTICOAGULATION: Chronic | ICD-10-CM

## 2024-08-26 DIAGNOSIS — F10.10 ETOH ABUSE: Chronic | ICD-10-CM

## 2024-08-26 DIAGNOSIS — Z99.81 CHRONIC RESPIRATORY FAILURE WITH HYPOXIA, ON HOME O2 THERAPY: Chronic | ICD-10-CM

## 2024-08-26 DIAGNOSIS — I48.21 PERMANENT ATRIAL FIBRILLATION: Chronic | ICD-10-CM

## 2024-08-26 DIAGNOSIS — Z72.0 TOBACCO ABUSE: Chronic | ICD-10-CM

## 2024-08-26 DIAGNOSIS — E78.2 MIXED HYPERLIPIDEMIA: Chronic | ICD-10-CM

## 2024-08-26 DIAGNOSIS — I50.42 CHRONIC COMBINED SYSTOLIC AND DIASTOLIC CONGESTIVE HEART FAILURE: Primary | Chronic | ICD-10-CM

## 2024-08-26 DIAGNOSIS — I10 ESSENTIAL HYPERTENSION: Chronic | ICD-10-CM

## 2024-08-26 PROCEDURE — 3074F SYST BP LT 130 MM HG: CPT | Performed by: NURSE PRACTITIONER

## 2024-08-26 PROCEDURE — 1160F RVW MEDS BY RX/DR IN RCRD: CPT | Performed by: NURSE PRACTITIONER

## 2024-08-26 PROCEDURE — 1159F MED LIST DOCD IN RCRD: CPT | Performed by: NURSE PRACTITIONER

## 2024-08-26 PROCEDURE — 3078F DIAST BP <80 MM HG: CPT | Performed by: NURSE PRACTITIONER

## 2024-08-26 PROCEDURE — 99214 OFFICE O/P EST MOD 30 MIN: CPT | Performed by: NURSE PRACTITIONER

## 2024-08-26 RX ORDER — GAUZE BANDAGE 2" X 2"
BANDAGE TOPICAL
COMMUNITY

## 2024-08-26 NOTE — PROGRESS NOTES
Subjective:     Encounter Date: 08/26/2024      Patient ID: Burt Mcdonald is a 73 y.o. male with known persistent atrial fibrillation with long-term anticoagulation, hypertension, COPD, chronic hypoxic respiratory failure with home oxygen, chronic systolic and diastolic heart failure, and ongoing tobacco and EtOH use.  He presents to the office today for follow up.     Chief Complaint: CHF/AF Follow Up   Atrial Fibrillation  Presents for follow-up visit. Symptoms include shortness of breath (chronic). Symptoms are negative for bradycardia, chest pain, dizziness, hemodynamic instability, hypertension, hypotension, palpitations, syncope, tachycardia and weakness. The symptoms have been stable. Past medical history includes atrial fibrillation and CHF. There are no medication compliance problems.   Congestive Heart Failure  Presents for follow-up visit. Associated symptoms include fatigue and shortness of breath (chronic). Pertinent negatives include no chest pain, chest pressure, edema, near-syncope, palpitations or unexpected weight change. The symptoms have been stable. Compliance with total regimen is 51-75%. Compliance problems include adherence to exercise.  Compliance with diet is 51-75%. Compliance with exercise is 26-50%. Compliance with medications is %.     The patient was evaluated in our office on 5/24/2021 by Dr. Nick Sniedr.  He was referred to our office due to the finding of atrial fibrillation by his primary care physician. He had been started on anticoagulation by his PCP. Due to the new finding, unknown chronicity, the patient was set up for outpatient cardioversion. Prior to his office appointment he was placed in a Holter monitor for surveillance. He remained in atrial fibrillation throughout the entirety of the monitoring period.    On 6/2/2021 the patient had successful cardioversion. He displayed a sinus rhythm post cardioversion. He was discharged home in stable condition.  He denies any changes in symptoms. He has a history of hypertension and COPD with ongoing tobacco abuse with mild dyspnea on exertion. His shortness of breath is chronic due to his lung history. He denied any other symptoms such as lightheadedness, dizziness, chest pain, palpitations. Post cardioversion the patient denied any change in his symptoms.    At the follow-up in July 2021 he was found to be back in atrial fibrillation on EKG and was stable and asymptomatic.  Since he had no reported symptoms that changed after cardioversion (and was feeling well without complaints), it was felt that rate control and anticoagulation was the best strategy.  He has been stable with routine outpatient follow ups.    He was hospitalized in November 2023. He was brought to the ED after increased weakness and confusion at home as well as shortness of breath. He was found to be in atrial fibrillation (baseline) with RVR and was admitted for treatment of COPD and pneumonia. During his hospital stay he was evaluated by electrophysiology. During his hospital stay medication adjustments were made for improvement of rate control. His beta blocker was stopped - due to his chronic lung disease and diltiazem was started. He was also started on digoxin. An echo was obtained, noting a slight drop in LVEF to 41-45%.  It was felt that his lung disease and acute illness were contributory to his elevated heart rates, but that his AF and current respiratory illness be contributing to his heart failure symptoms.  Also, it was discussed that long term he may continue to have issues with heart failure symptoms and worsening LVEF if he has ongoing bouts of uncontrolled heart rates. Therefore, other options, such as ablation, AVN ablation, and device implant were discussed with the patient.     He has seen EP in the outpatient setting to further discuss these options as he did not remember those discussions as in inpatient. He has elected to continue  to pursue rate control and medication management of heart failure at this time.     He presents today for routine follow up. He has improved overall since his last visit with me. He is receiving injections for his back pain and has had significant improvement of his pain with this. He has seen his PCP and had biopsy to a right lower leg lesion now with wound at the site. Biopsy confirmed squamous cell skin cancer and he has been referred to dermatology.     His breathing is stable. He has not required his oxygen during the daytime as much recently unless its really hot outside. He sleeps with it routinely. He has neuropathy and some swelling to his lower right leg.     He reports compliance with his home medications. He has no complaints uncontrolled heart rates that he is aware of. He is compliant with medications. He is on digoxin and diltiazem for rate control. He is managed on Xarelto for anticoagulation. Echo historically noted EF of 56-60%. Most recent was 40-45%. He is not on beta blocker therapy and is on diltiazem. Hesitations for change in this due to his chronic lung disease.      The following portions of the patient's history were reviewed and updated as appropriate: allergies, current medications, past family history, past medical history, past social history and past surgical history.     No Known Allergies      Current Outpatient Medications:     albuterol sulfate  (90 Base) MCG/ACT inhaler, Inhale 2 puffs Every 4 (Four) Hours As Needed for Wheezing or Shortness of Air., Disp: , Rfl:     digoxin (LANOXIN) 125 MCG tablet, Take 1 tablet by mouth Daily., Disp: 30 tablet, Rfl: 1    dilTIAZem CD (CARDIZEM CD) 360 MG 24 hr capsule, Take 1 capsule by mouth Daily for 60 days., Disp: 30 capsule, Rfl: 1    DULoxetine (CYMBALTA) 60 MG capsule, Take 1 capsule by mouth Daily., Disp: , Rfl:     fenofibrate (TRICOR) 145 MG tablet, Take 1 tablet by mouth Daily., Disp: , Rfl:     furosemide (LASIX) 80 MG  tablet, Take 1 tablet by mouth Daily for 60 days. (Patient taking differently: Take 1 tablet by mouth Daily. 1/2 tablet a day), Disp: 30 tablet, Rfl: 1    gabapentin (NEURONTIN) 600 MG tablet, Take 1 tablet by mouth 3 (Three) Times a Day., Disp: 90 tablet, Rfl: 0    HYDROcodone-acetaminophen (NORCO)  MG per tablet, Take 1 tablet by mouth Every 8 (Eight) Hours As Needed for Severe Pain., Disp: 90 tablet, Rfl: 0    lisinopril (PRINIVIL,ZESTRIL) 5 MG tablet, Take 1 tablet by mouth Daily for 60 days., Disp: 30 tablet, Rfl: 1    Mucus Relief 600 MG 12 hr tablet, , Disp: , Rfl:     omeprazole (priLOSEC) 20 MG capsule, Take 1 capsule by mouth Daily., Disp: , Rfl:     rivaroxaban (XARELTO) 20 MG tablet, Take 1 tablet by mouth Daily., Disp: , Rfl:     rosuvastatin (CRESTOR) 10 MG tablet, Take 1 tablet by mouth Daily., Disp: , Rfl:     tamsulosin (FLOMAX) 0.4 MG capsule 24 hr capsule, Take 1 capsule by mouth Daily., Disp: , Rfl:     Thiamine Mononitrate (RA Vitamin B-1) 100 MG tablet, Take  by mouth., Disp: , Rfl:     Trelegy Ellipta 200-62.5-25 MCG/ACT aerosol powder , Inhale 1 puff Daily., Disp: , Rfl:     triazolam (HALCION) 0.25 MG tablet, , Disp: , Rfl:     Review of Systems   Constitutional: Positive for fatigue and malaise/fatigue. Negative for diaphoresis, unexpected weight change and weight gain.   HENT:  Negative for congestion and nosebleeds.    Cardiovascular:  Positive for dyspnea on exertion ( chronic ) and leg swelling. Negative for chest pain, irregular heartbeat, near-syncope, orthopnea, palpitations, paroxysmal nocturnal dyspnea and syncope.   Respiratory:  Positive for shortness of breath (chronic). Negative for cough and wheezing.    Hematologic/Lymphatic: Negative for bleeding problem. Bruises/bleeds easily.   Skin:  Positive for poor wound healing and skin cancer.   Musculoskeletal:  Positive for back pain ( radiculopathy) and stiffness.   Gastrointestinal:  Negative for bloating and nausea.  "  Neurological:  Positive for paresthesias ( burning to BLE R>L ). Negative for dizziness, focal weakness, headaches, light-headedness, loss of balance and weakness.   Psychiatric/Behavioral:  Negative for altered mental status.        Procedures     Objective:     Vitals reviewed.   Constitutional:       General: Awake. Not in acute distress.     Appearance: Well-developed, well-groomed, overweight and not in distress. Chronically ill-appearing. Not diaphoretic.   Eyes:      General:         Right eye: No discharge.         Left eye: No discharge.   HENT:      Head: Normocephalic and atraumatic.    Mouth/Throat:      Pharynx: No oropharyngeal exudate.   Pulmonary:      Effort: Pulmonary effort is normal. No respiratory distress.      Breath sounds: Decreased air movement present. Decreased breath sounds present. No wheezing. No rhonchi. No rales.   Cardiovascular:      Normal rate. Irregularly irregular rhythm.      Murmurs: There is no murmur.      No gallop.  No rub.   Edema:     Peripheral edema absent.   Musculoskeletal: Normal range of motion.      Cervical back: Normal range of motion and neck supple. Skin:     General: Skin is warm and dry.      Findings: Lesion (RLL) present.   Neurological:      Mental Status: Alert, oriented to person, place, and time and oriented to person, place and time.   Psychiatric:         Attention and Perception: Attention normal.         Mood and Affect: Mood normal.         Speech: Speech normal.         Behavior: Behavior normal. Behavior is cooperative.         Thought Content: Thought content normal.         Cognition and Memory: Cognition normal.         Judgment: Judgment normal.     /60   Pulse 71   Ht 170.2 cm (67.01\")   Wt 74.8 kg (165 lb)   SpO2 92%   BMI 25.83 kg/m²     Lab Review:       Results for orders placed during the hospital encounter of 11/16/23    Adult Transthoracic Echo Complete w/ Color, Spectral and Contrast if Necessary Per " Protocol    Interpretation Summary    Left ventricular systolic function is mildly decreased. Left ventricular ejection fraction appears to be 41 - 45%.    Left ventricular wall thickness is consistent with mild concentric hypertrophy.    Moderately reduced right ventricular systolic function noted.    Biatrial enlargement is noted.    Mild mitral valve regurgitation is present.    Mild tricuspid valve regurgitation is present. Estimated right ventricular systolic pressure from tricuspid regurgitation is markedly elevated (>55 mmHg).    A small to moderate size circumferential pericardial effusion is noted with the larger amount of fluid seen posterior to the heart with no obvious echocardiographic signs of tamponade.        Assessment:          Diagnosis Plan   1. Chronic combined systolic and diastolic congestive heart failure        2. Permanent atrial fibrillation        3. Current use of long term anticoagulation        4. Essential hypertension        5. Mixed hyperlipidemia        6. Chronic respiratory failure with hypoxia, on home O2 therapy        7. Tobacco abuse        8. ETOH abuse                     Plan:       - Chronic systolic and diastolic heart failure: NYHA Class III - fatigue and shortness of breath at baseline due to other contributing factors - respiratory failure, COPD, and chronic back pain. He is currently managed on diuretic therapy. He had been on beta blocker therapy but it was felt due to his lung disease that avoiding beta blocker therapy was ideal and he was started on diltiazem and digoxin by EP.  He is currently on lisinopril and lasix. He is euvolemic on exam today. No changes at this time.     - Atrial fibrillation: Permanent. Long standing af with previous cardioversion and return of atrial fibrillation ar follow-up without change in symptoms so he has been managed from a rate control strategy moving forward. He remains anticoagulated with Xarelto and denies any bleeding issues.   He has had recent elevated heart rates during an inpatient hospital stay due to acute illnesses. Rate control medications were adjusted at that time and EP was consulted by his PCP provider. He has seen EP in the outpatient setting, but the patient has chosen to remain conservative with medical management at this time. He continues to feel stable without further issues of known elevated heart rates. He does not currently have a follow up with EP as this was canceled by our office it appears. We will have this rescheduled to follow up on management of AF and plans for further procedures, if necessary.     [id number] SCORE   [id number] Score: 3 (8/26/2024 11:40 AM)    - Anticoagulation: Continue Xarelto 20 mg daily with food for stroke risk reduction in the setting of persistent atrial fibrillation.    - Hypertension: Stable.  His blood pressure is well controlled. He follows with his PCP office routinely for management.     - Hyperlipidemia: The patient has been started on rosuvastatin 10 mg daily by his PCP office.  He has routine labs drawn at their facility.  He is tolerating this medication without complaints.     -Chronic respiratory failure: Currently on home oxygen.     -Tobacco abuse/etoh: Advised smoking cessation. He understands that his alcohol use may be contributing to his cardiomyopathy as well.     Continue current medications  3 month follow up      Advance Care Planning   ACP discussion was held with the patient during this visit. Patient does not have an advance directive, information provided.      I attest that all portions of this note have been reviewed and updated to reflect the patient's current status.

## 2024-09-05 ENCOUNTER — HOSPITAL ENCOUNTER (OUTPATIENT)
Dept: CT IMAGING | Facility: HOSPITAL | Age: 74
Discharge: HOME | End: 2024-09-05
Admitting: NURSE PRACTITIONER
Payer: MEDICARE

## 2024-09-05 DIAGNOSIS — Z98.890 HISTORY OF LUMBAR LAMINECTOMY: ICD-10-CM

## 2024-09-05 DIAGNOSIS — M41.50 DEGENERATIVE SCOLIOSIS: ICD-10-CM

## 2024-09-05 DIAGNOSIS — M54.50 LUMBAR BACK PAIN: ICD-10-CM

## 2024-09-05 PROCEDURE — 72131 CT LUMBAR SPINE W/O DYE: CPT

## 2024-09-09 ENCOUNTER — DOCUMENTATION (OUTPATIENT)
Dept: NEUROSURGERY | Facility: CLINIC | Age: 74
End: 2024-09-09
Payer: MEDICARE

## 2024-09-23 ENCOUNTER — HOSPITAL ENCOUNTER (OUTPATIENT)
Dept: NEUROLOGY | Facility: HOSPITAL | Age: 74
Discharge: HOME | End: 2024-09-23
Admitting: NURSE PRACTITIONER
Payer: MEDICARE

## 2024-09-23 DIAGNOSIS — M79.604 BILATERAL LEG PAIN: ICD-10-CM

## 2024-09-23 DIAGNOSIS — R20.2 NUMBNESS AND TINGLING OF BOTH LOWER EXTREMITIES: ICD-10-CM

## 2024-09-23 DIAGNOSIS — M79.605 BILATERAL LEG PAIN: ICD-10-CM

## 2024-09-23 DIAGNOSIS — R20.0 NUMBNESS AND TINGLING OF BOTH LOWER EXTREMITIES: ICD-10-CM

## 2024-09-23 PROCEDURE — 95886 MUSC TEST DONE W/N TEST COMP: CPT

## 2024-09-23 PROCEDURE — 95912 NRV CNDJ TEST 11-12 STUDIES: CPT

## 2024-10-01 ENCOUNTER — DOCUMENTATION (OUTPATIENT)
Dept: NEUROSURGERY | Facility: CLINIC | Age: 74
End: 2024-10-01
Payer: MEDICARE

## 2024-11-13 ENCOUNTER — OFFICE VISIT (OUTPATIENT)
Dept: NEUROSURGERY | Facility: CLINIC | Age: 74
End: 2024-11-13
Payer: MEDICARE

## 2024-11-13 VITALS — HEIGHT: 67 IN | WEIGHT: 165 LBS | BODY MASS INDEX: 25.9 KG/M2

## 2024-11-13 DIAGNOSIS — Z72.0 TOBACCO ABUSE: ICD-10-CM

## 2024-11-13 DIAGNOSIS — M43.10 ACQUIRED SPONDYLOLISTHESIS: ICD-10-CM

## 2024-11-13 DIAGNOSIS — G60.9 HEREDITARY AND IDIOPATHIC PERIPHERAL NEUROPATHY: ICD-10-CM

## 2024-11-13 DIAGNOSIS — M41.50 DEGENERATIVE SCOLIOSIS: Primary | ICD-10-CM

## 2024-11-13 DIAGNOSIS — M51.372 DEGENERATION OF INTERVERTEBRAL DISC OF LUMBOSACRAL REGION WITH DISCOGENIC BACK PAIN AND LOWER EXTREMITY PAIN: ICD-10-CM

## 2024-11-13 DIAGNOSIS — E66.3 OVERWEIGHT WITH BODY MASS INDEX (BMI) OF 25 TO 25.9 IN ADULT: ICD-10-CM

## 2024-11-13 NOTE — PROGRESS NOTES
Primary Care Provider: Arjun Roman MD    Chief Complaint:   No chief complaint on file.    History of Present Illness    HPI:  Burt Mcdonald is a 74 y.o. male who presents today with his daughter with complaint of lumbar back and bilateral leg pain, 60% legs, 40% back.      History of multiple lumbar surgeries by Dr. Herring in 2015 and 2019.   In 2015 it appears he underwent placement of Coflex at L4-5, followed by removal of Coflex and bilateral hemilaminectomy, partial facetectomy and neural decompression at L3-4 in 2019 for treatment of lumbar back and right leg pain that improved but did not resolve postoperatively.    History of Present Illness  The patient presents for evaluation of lower back pain and leg pain. He is accompanied by his daughter.    He reports experiencing lower back pain and leg pain. His sciatic nerve issue was nearly resolved by Dr. Herring, but it has since recurred. He underwent a 2-level Coflex procedure performed by Dr. Herring in 2019. He is unable to straighten up without bending his knees. He finds relief from his symptoms when lying down. He does not engage in much therapy or exercise.    He also experiences a burning sensation in his feet, which is somewhat alleviated by injections administered by Driss at [facility]. His current medications include gabapentin and Norco. He was previously on ibuprofen, but it was discontinued due to potential kidney damage.    He reports no numbness or tingling in his hands, but notes that they tend to get cold easily. He has not been tested for diabetes.    He uses an oxygen tank when walking due to lung issues. He also has a heart condition, which is currently managed with medication, negating the need for surgery. He is unable to lie on his left side due to difficulty breathing.    He has a history of smoking, but never more than a pack a day. 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         Oswestry Disability Index Lumbar = 38%  SCORE INTERPRETATION OF THE OSWESTRY LBP DISABILITY QUESTIONNAIRE: 20-40% Moderate disability This group experiences more pain and problems with sitting, lifting, and standing. Travel and social life are more difficult and they may well be off work. Personal care, sexual activity, and sleeping are not grossly affected, and the back condition can usually be managed by conservative means.      Score   Pain Intensity Very mild pain-1   Personal Care Look after myslef but very painful-1   Lifting Can lift heavy weights with extra pain-1   Walking Pain prevents > 100 yards-3   Sitting Pain prevents sitting > 1 hr-2   Standing Pain limits standing to < 10 min-4   Sleeping Occasionally disturbed-1   Sex Life (if applicable) Not applicable-0   Social Life I do not go out as often because of pain-3   Traveling Pain restricts to < 1 hr-3   (Payton et al, 1980)      ROS  Review of Systems   Constitutional:  Positive for activity change, appetite change, fatigue and unexpected weight change.   HENT:  Positive for voice change.    Eyes: Negative.    Respiratory:  Positive for cough and shortness of breath.    Cardiovascular:  Positive for leg swelling.   Gastrointestinal: Negative.    Endocrine: Positive for cold intolerance and polyuria.   Genitourinary:  Positive for enuresis.   Musculoskeletal:  Positive for arthralgias, back pain and gait problem.   Skin:  Positive for color change and pallor.   Allergic/Immunologic: Negative.    Neurological:  Positive for weakness, numbness and headaches.   Hematological:  Bruises/bleeds easily.   Psychiatric/Behavioral: Negative.     All other systems reviewed and are negative.    Past Medical History:   Diagnosis Date    A-fib     COPD (chronic obstructive pulmonary disease)     Elevated PSA     Hypercholesteremia     Hypertension     Low back pain      Past Surgical History:   Procedure Laterality Date    BACK SURGERY       EYE SURGERY  01/2016    Bilateral cataract     Family History: family history includes Cancer in his father and mother; No Known Problems in his brother, brother, brother, and sister.    Social History:  reports that he has been smoking cigarettes. His smokeless tobacco use includes chew. He reports current alcohol use of about 3.0 standard drinks of alcohol per week. He reports that he does not use drugs.    Medications:    Current Outpatient Medications:     albuterol sulfate  (90 Base) MCG/ACT inhaler, Inhale 2 puffs Every 4 (Four) Hours As Needed for Wheezing or Shortness of Air., Disp: , Rfl:     digoxin (LANOXIN) 125 MCG tablet, Take 1 tablet by mouth Daily., Disp: 30 tablet, Rfl: 1    dilTIAZem CD (CARDIZEM CD) 360 MG 24 hr capsule, Take 1 capsule by mouth Daily for 60 days., Disp: 30 capsule, Rfl: 1    DULoxetine (CYMBALTA) 60 MG capsule, Take 1 capsule by mouth Daily., Disp: , Rfl:     fenofibrate (TRICOR) 145 MG tablet, Take 1 tablet by mouth Daily., Disp: , Rfl:     furosemide (LASIX) 80 MG tablet, Take 1 tablet by mouth Daily for 60 days. (Patient taking differently: Take 1 tablet by mouth Daily. 1/2 tablet a day), Disp: 30 tablet, Rfl: 1    gabapentin (NEURONTIN) 600 MG tablet, Take 1 tablet by mouth 3 (Three) Times a Day., Disp: 90 tablet, Rfl: 0    HYDROcodone-acetaminophen (NORCO)  MG per tablet, Take 1 tablet by mouth Every 8 (Eight) Hours As Needed for Severe Pain., Disp: 90 tablet, Rfl: 0    lisinopril (PRINIVIL,ZESTRIL) 5 MG tablet, Take 1 tablet by mouth Daily for 60 days., Disp: 30 tablet, Rfl: 1    Mucus Relief 600 MG 12 hr tablet, , Disp: , Rfl:     omeprazole (priLOSEC) 20 MG capsule, Take 1 capsule by mouth Daily., Disp: , Rfl:     rivaroxaban (XARELTO) 20 MG tablet, Take 1 tablet by mouth Daily., Disp: , Rfl:     rosuvastatin (CRESTOR) 10 MG tablet, Take 1 tablet by mouth Daily., Disp: , Rfl:     tamsulosin (FLOMAX) 0.4 MG capsule 24 hr capsule, Take 1 capsule by mouth  Daily., Disp: , Rfl:     Thiamine Mononitrate (RA Vitamin B-1) 100 MG tablet, Take  by mouth., Disp: , Rfl:     Trelegy Ellipta 200-62.5-25 MCG/ACT aerosol powder , Inhale 1 puff Daily., Disp: , Rfl:     triazolam (HALCION) 0.25 MG tablet, , Disp: , Rfl:     Allergies:  Patient has no known allergies.    Objective   There were no vitals taken for this visit.  Physical Exam  Vitals and nursing note reviewed.   Constitutional:       General: He is not in acute distress.     Appearance: Normal appearance. He is well-developed, well-groomed and overweight. He is not ill-appearing, toxic-appearing or diaphoretic.      Comments: BMI 25.35   HENT:      Head: Normocephalic and atraumatic.      Right Ear: Hearing normal.      Left Ear: Hearing normal.   Eyes:      General: Lids are normal.      Extraocular Movements: Extraocular movements intact.      Conjunctiva/sclera: Conjunctivae normal.      Pupils: Pupils are equal, round, and reactive to light.   Neck:      Trachea: Trachea normal.   Cardiovascular:      Rate and Rhythm: Normal rate and regular rhythm.   Pulmonary:      Effort: Pulmonary effort is normal. No tachypnea, bradypnea, accessory muscle usage or respiratory distress.   Abdominal:      Palpations: Abdomen is soft.   Musculoskeletal:      Cervical back: Full passive range of motion without pain and neck supple.   Skin:     General: Skin is warm and dry.   Neurological:      GCS: GCS eye subscore is 4. GCS verbal subscore is 5. GCS motor subscore is 6.      Coordination: Coordination is intact.      Deep Tendon Reflexes:      Reflex Scores:       Tricep reflexes are 0 on the right side and 0 on the left side.       Bicep reflexes are 0 on the right side and 0 on the left side.       Brachioradialis reflexes are 0 on the right side and 0 on the left side.       Patellar reflexes are 1+ on the right side and 1+ on the left side.       Achilles reflexes are 0 on the right side and 0 on the left side.  Psychiatric:          Speech: Speech normal.         Behavior: Behavior normal. Behavior is cooperative.       Neurological Exam  Mental Status  Awake, alert and oriented to person, place and time. Speech is normal. Language is fluent with no aphasia. Attention and concentration are normal.    Cranial Nerves  CN I: Sense of smell is normal.  CN II: Visual acuity is normal.  CN III, IV, VI: Extraocular movements intact bilaterally. Normal lids and orbits bilaterally. Pupils equal round and reactive to light bilaterally.  CN V: Facial sensation is normal.  CN VII: Full and symmetric facial movement.  CN IX, X: Palate elevates symmetrically  CN XI: Shoulder shrug strength is normal.  CN XII: Tongue midline without atrophy or fasciculations.    Motor  Normal muscle bulk throughout. Normal muscle tone.                                               Right                     Left  Toe extension                        5                          5                                             Right                     Left  Deltoid                                   5                          5   Biceps                                   5                          5   Triceps                                  5                          5   Wrist extensor                       5                          5   Iliopsoas                               4+                          4+   Quadriceps                           5                          5   Anterior tibialis                      5                          5   Posterior tibialis                    5                          5    Sensory  Sensation is intact to light touch, pinprick, vibration and proprioception in all four extremities.    Reflexes                                            Right                      Left  Brachioradialis                    0                         0  Biceps                                 0                         0  Triceps                                 0                         0  Patellar                                1+                         1+  Achilles                                0                         0  Right Plantar: downgoing  Left Plantar: downgoing    Right pathological reflexes: Leighton's absent. Ankle clonus absent.  Left pathological reflexes: Leighton's absent. Ankle clonus absent.    Coordination    Finger-to-nose, rapid alternating movements and heel-to-shin normal bilaterally without dysmetria.    Gait    Wide-based slightly kyphotic gait.    Imaging: (independent review and interpretation)  7/13/2024.  MRI of the lumbar spine shows no STIR signal changes suggest acute fractures, no bone marrow signal change, degenerative scoliosis of the lumbar spine with apex at L2-3, multilevel degenerative changes resulting in moderate thecal sac compression and bilateral foraminal stenosis at L2-3 and L3-4, thecal sac compression and right foraminal stenosis at L4-5 and L5-S1.                                Results  Imaging  CT scan of lumbar spine shows degenerative disc disease with bone on bone in the low back. No fractured bones were observed. Scoliosis films show an SS shaped scoliosis with an angle of 31 degrees.  13 cm positive sagittal balance.  Pelvic incidence is 40 degrees and lumbar lordosis is 13 degrees.    Testing  EMG and nerve conduction study showed peripheral neuropathy.        ASSESSMENT and PLAN  Burt Mcdonald is a 74 y.o. male with significant medical comorbidities to include multiple prior lumbar surgeries (Dr. Herring 2015, 2019), A-fib currently on Xarelto, alcohol abuse, COPD, hypertension, hyperlipidemia, elevated PSA, tobacco abuse, and he is overweight.   He presents with a new problem of lumbar back and bilateral leg pain, 60% legs, 40% back.   Physical exam findings of +4/5 bilateral IP weakness, grossly muted reflexes, and a wide-based slightly kyphotic gait.  His imaging shows degenerative scoliosis of the lumbar  spine with apex at L2-3, multilevel degenerative changes resulting in moderate thecal sac compression and bilateral foraminal stenosis at L2-3 and L3-4, thecal sac compression and right foraminal stenosis at L4-5 and L5-S1.    RECOMMENDATIONS ...  Degenerative scoliosis  Lumbar back and bilateral leg pain and dysesthesias  History of multiple prior lumbar surgeries (Dr. Herring 2015, 2019)  Assessment & Plan  1. Lower back pain.  The patient's symptoms are consistent with degenerative scoliosis and degenerative disc disease. Despite the absence of stenosis, he presents with bone-on-bone contact in his lower back. His scoliosis films reveal an S-shaped curvature, with a 31-degree angle in his lower back. This, along with a 13 cm forward displacement of his head relative to his pelvis, and a discrepancy between his pelvic incidence and lumbar lordosis, all indicate the need for lumbar degenerative scoliosis surgery. However, given his current health status, including a non-healing foot wound, heart and lung issues, and smoking habit, the risks associated with such a surgery are deemed too high. He was advised to maintain an active lifestyle and focus on core strengthening exercises to alleviate pressure on his lower back. A brace was not recommended as it could lead to muscle atrophy.    2. Leg pain.  An EMG and nerve conduction study have revealed peripheral neuropathy, which is causing the burning sensation in his feet. He was advised to wear compression socks and elevate his legs periodically. A handout on peripheral neuropathy was provided for his reference.    3. Peripheral neuropathy.  The burning sensation in his feet is attributed to peripheral neuropathy. He was advised to continue taking gabapentin and Norco as prescribed. He was also advised to maintain an active lifestyle to help manage symptoms.  Could consider referral to neurology.    4. Non-healing foot wound.  The patient has a non-healing foot wound  from a squamous cell carcinoma removal in September 2024. The slow healing is likely due to his smoking habit and COPD. He was advised to quit smoking to improve healing and overall health.    5. COPD.  The patient uses oxygen when walking and has a history of smoking. He was advised to quit smoking to improve his lung function and overall health.    6. Heart issues.  The patient has a history of heart problems and was previously considered for an ablation. Currently, his condition is being managed with medication, and no surgery is planned. He was advised to continue his current medication regimen and follow up with his cardiologist as needed.            Tobacco abuse  The patient understands the many dangers of continuing to use tobacco.  If quitting becomes an increased priority Don knows to contact us for help with quitting.       Overweight (BMI 25.0-29.9)  There is no height or weight on file to calculate BMI..  Information on the DASH diet provided in the AVS.  We will continue to provided diet and exercise information with the goal of weight loss at each scheduled appointment.     ANG Fall Risk Assessment was completed, and patient is at HIGH risk for falls. Assessment completed on:8/19/2024  Fall risk information provided in AVS.    There are no diagnoses linked to this encounter.    No follow-ups on file.    Thank you for this Consultation and the opportunity to participate in Burt's care.    I spent 36 minutes caring for Burt on this date of service. This time includes time spent by me in the following activities: preparing for the visit, reviewing tests, obtaining and/or reviewing a separately obtained history, performing a medically appropriate examination and/or evaluation, counseling and educating the patient/family/caregiver, ordering medications, tests, or procedures, referring and communicating with other health care professionals, documenting information in the medical record, independently  interpreting results and communicating that information with the patient/family/caregiver, and/or care coordination.         Sincerely,  Chucky Wooten MD

## 2024-11-13 NOTE — PATIENT INSTRUCTIONS
"PATIENT TO CONTINUE TO FOLLOW UP WITH HIS PRIMARY CARE PROVIDER FOR YEARLY PHYSICAL EXAMS TO ENSURE COMPLETE HEALTH MAINTENANCE      BMI for Adults  Body mass index (BMI) is a number found using a person's weight and height. BMI can help tell how much of a person's weight is made up of fat. BMI does not measure body fat directly. It is used instead of tests that directly measure body fat, which can be difficult and expensive.  What are BMI measurements used for?  BMI is useful to:  Find out if your weight puts you at higher risk for medical problems.  Help recommend changes, such as in diet and exercise. This can help you reach a healthy weight. BMI screening can be done again to see if these changes are working.  How is BMI calculated?  Your height and weight are measured. The BMI is found from those numbers. This can be done with [place] or metric measurements. Note that charts and online BMI calculators are available to help you find your BMI quickly and easily without doing these calculations.  To calculate your BMI in [place] measurements:  Measure your weight in pounds (lb).  Multiply the number of pounds by 703.  So, for an adult who weighs 150 lb, multiply that number by 703: 150 x 703, which equals 105,450.  Measure your height in inches. Then multiply that number by itself to get a measurement called \"inches squared.\"  So, for an adult who is 70 inches tall, the \"inches squared\" measurement is 70 inches x 70 inches, which equals 4,900 inches squared.  Divide the total from step 2 (number of lb x 703) by the total from step 3 (inches squared): 105,450 ÷ 4,900 = 21.5. This is your BMI.  To calculate your BMI in metric measurements:    Measure your weight in kilograms (kg).  For this example, the weight is 70 kg.  Measure your height in meters (m). Then multiply that number by itself to get a measurement called \"meters squared.\"  So, for an adult who is 1.75 m tall, the \"meters squared\" measurement is 1.75 m x 1.75 " m, which equals 3.1 meters squared.  Divide the number of kilograms (your weight) by the meters squared number. In this example: 70 ÷ 3.1 = 22.6. This is your BMI.  What do the results mean?  BMI charts are used to see if you are underweight, normal weight, overweight, or obese. The following guidelines will be used:  Underweight: BMI less than 18.5.  Normal weight: BMI between 18.5 and 24.9.  Overweight: BMI between 25 and 29.9.  Obese: BMI of 30 or above.  BMI is a tool and cannot diagnose a condition. Talk with your health care provider about what your BMI means for you. Keep these notes in mind:  Weight includes fat and muscle. Someone with a muscular build, such as an athlete, may have a BMI that is higher than 24.9. In cases like these, BMI is not a correct measure of body fat.  If you have a BMI of 25 or higher, your provider may need to do more testing to find out if excess body fat is the cause.  BMI is measured the same way for males and females. Females usually have more body fat than males of the same height and weight.  Where to find more information  For more information about BMI, including tools to quickly find your BMI, go to:  Centers for Disease Control and Prevention: cdc.gov  American Heart Association: heart.org  National Heart, Lung, and Blood [place]: nhlbi.nih.gov  This information is not intended to replace advice given to you by your health care provider. Make sure you discuss any questions you have with your health care provider.  Document Revised: 09/07/2023 Document Reviewed: 08/31/2023  [employer] Patient Education © 2024 [employer]  DASH Eating Plan  DASH stands for Dietary Approaches to Stop Hypertension. The DASH eating plan is a healthy eating plan that has been shown to:  Lower high blood pressure (hypertension).  Reduce your risk for type 2 diabetes, heart disease, and stroke.  Help with weight loss.  What are tips for following this plan?  Reading food labels  Check food  "labels for the amount of salt (sodium) per serving. Choose foods with less than 5 percent of the Daily Value (DV) of sodium. In general, foods with less than 300 milligrams (mg) of sodium per serving fit into this eating plan.  To find whole grains, look for the word \"whole\" as the first word in the ingredient list.  Shopping  Buy products labeled as \"low-sodium\" or \"no salt added.\"  Buy fresh foods. Avoid canned foods and pre-made or frozen meals.  Cooking  Try not to add salt when you cook. Use salt-free seasonings or herbs instead of table salt or sea salt. Check with your health care provider or pharmacist before using salt substitutes.  Do not garcia foods. Cook foods in healthy ways, such as baking, boiling, grilling, roasting, or broiling.  Cook using oils that are good for your heart. These include olive, canola, avocado, soybean, and sunflower oil.  Meal planning    Eat a balanced diet. This should include:  4 or more servings of fruits and 4 or more servings of vegetables each day. Try to fill half of your plate with fruits and vegetables.  6-8 servings of whole grains each day.  6 or less servings of lean meat, poultry, or fish each day. 1 oz is 1 serving. A 3 oz (85 g) serving of meat is about the same size as the palm of your hand. One egg is 1 oz (28 g).  2-3 servings of low-fat dairy each day. One serving is 1 cup (237 mL).  1 serving of nuts, seeds, or beans 5 times each week.  2-3 servings of heart-healthy fats. Healthy fats called omega-3 fatty acids are found in foods such as walnuts, flaxseeds, fortified milks, and eggs. These fats are also found in cold-water fish, such as sardines, salmon, and mackerel.  Limit how much you eat of:  Canned or prepackaged foods.  Food that is high in trans fat, such as fried foods.  Food that is high in saturated fat, such as fatty meat.  Desserts and other sweets, sugary drinks, and other foods with added sugar.  Full-fat dairy products.  Do not salt foods before " eating.  Do not eat more than 4 egg yolks a week.  Try to eat at least 2 vegetarian meals a week.  Eat more home-cooked food and less restaurant, buffet, and fast food.  Lifestyle  When eating at a restaurant, ask if your food can be made with less salt or no salt.  If you drink alcohol:  Limit how much you have to:  0-1 drink a day if you are female.  0-2 drinks a day if you are male.  Know how much alcohol is in your drink. In the [place] one drink is one 12 oz bottle of beer (355 mL), one 5 oz glass of wine (148 mL), or one 1½ oz glass of hard liquor (44 mL).  General information  Avoid eating more than 2,300 mg of salt a day. If you have hypertension, you may need to reduce your sodium intake to 1,500 mg a day.  Work with your provider to stay at a healthy body weight or lose weight. Ask what the best weight range is for you.  On most days of the week, get at least 30 minutes of exercise that causes your heart to beat faster. This may include walking, swimming, or biking.  Work with your provider or dietitian to adjust your eating plan to meet your specific calorie needs.  What foods should I eat?  Fruits  All fresh, dried, or frozen fruit. Canned fruits that are in their natural juice and do not have sugar added to them.  Vegetables  Fresh or frozen vegetables that are raw, steamed, roasted, or grilled. Low-sodium or reduced-sodium tomato and vegetable juice. Low-sodium or reduced-sodium tomato sauce and tomato paste. Low-sodium or reduced-sodium canned vegetables.  Grains  Whole-grain or whole-wheat bread. Whole-grain or whole-wheat pasta. Brown rice. Oatmeal. Quinoa. Bulgur. Whole-grain and low-sodium cereals. Yany bread. Low-fat, low-sodium crackers. Whole-wheat flour tortillas.  Meats and other proteins  Skinless chicken or turkey. Ground chicken or turkey. Pork with fat trimmed off. Fish and seafood. Egg whites. Dried beans, peas, or lentils. Unsalted nuts, nut butters, and seeds. Unsalted canned beans. Lean  cuts of beef with fat trimmed off. Low-sodium, lean precooked or cured meat, such as sausages or meat loaves.  Dairy  Low-fat (1%) or fat-free (skim) milk. Reduced-fat, low-fat, or fat-free cheeses. Nonfat, low-sodium ricotta or cottage cheese. Low-fat or nonfat yogurt. Low-fat, low-sodium cheese.  Fats and oils  Soft margarine without trans fats. Vegetable oil. Reduced-fat, low-fat, or light mayonnaise and salad dressings (reduced-sodium). Canola, safflower, olive, avocado, soybean, and sunflower oils. Avocado.  Seasonings and condiments  Herbs. Spices. Seasoning mixes without salt.  Other foods  Unsalted popcorn and pretzels. Fat-free sweets.  The items listed above may not be all the foods and drinks you can have. Talk to a dietitian to learn more.  What foods should I avoid?  Fruits  Canned fruit in a light or heavy syrup. Fried fruit. Fruit in cream or butter sauce.  Vegetables  Creamed or fried vegetables. Vegetables in a cheese sauce. Regular canned vegetables that are not marked as low-sodium or reduced-sodium. Regular canned tomato sauce and paste that are not marked as low-sodium or reduced-sodium. Regular tomato and vegetable juices that are not marked as low-sodium or reduced-sodium. Pickles. Olives.  Grains  Baked goods made with fat, such as croissants, muffins, or some breads. Dry pasta or rice meal packs.  Meats and other proteins  Fatty cuts of meat. Ribs. Fried meat. Estrella. Bologna, salami, and other precooked or cured meats, such as sausages or meat loaves, that are not lean and low in sodium. Fat from the back of a pig (fatback). Bratwurst. Salted nuts and seeds. Canned beans with added salt. Canned or smoked fish. Whole eggs or egg yolks. Chicken or turkey with skin.  Dairy  Whole or 2% milk, cream, and half-and-half. Whole or full-fat cream cheese. Whole-fat or sweetened yogurt. Full-fat cheese. Nondairy creamers. Whipped toppings. Processed cheese and cheese spreads.  Fats and oils  Butter.  Stick margarine. Lard. Shortening. Ghee. Estrella fat. Tropical oils, such as coconut, palm kernel, or palm oil.  Seasonings and condiments  Onion salt, garlic salt, seasoned salt, table salt, and sea salt. Worcestershire sauce. Tartar sauce. Barbecue sauce. Teriyaki sauce. Soy sauce, including reduced-sodium soy sauce. Steak sauce. Canned and packaged gravies. Fish sauce. Oyster sauce. Cocktail sauce. Store-bought horseradish. Ketchup. Mustard. Meat flavorings and tenderizers. Bouillon cubes. Hot sauces. Pre-made or packaged marinades. Pre-made or packaged taco seasonings. Relishes. Regular salad dressings.  Other foods  Salted popcorn and pretzels.  The items listed above may not be all the foods and drinks you should avoid. Talk to a dietitian to learn more.  Where to find more information  National Heart, Lung, and Blood [place] (NHLBI): nhlbi.nih.gov  American Heart Association (AHA): heart.org  Academy of Nutrition and Dietetics: eatright.org  National Kidney Foundation (NKF): kidney.org  This information is not intended to replace advice given to you by your health care provider. Make sure you discuss any questions you have with your health care provider.  Document Revised: 01/04/2024 Document Reviewed: 01/04/2024  [employer] Patient Education © 2024 [employer]  Steps to Quit Smoking  Smoking tobacco is the leading cause of preventable death. It can affect almost every organ in the body. Smoking puts you and those around you at risk for developing many serious chronic diseases.  Quitting smoking can be very challenging. Do not get discouraged if you are not successful the first time. Some people need to make many attempts to quit before they achieve long-term success. Do your best to stick to your quit plan, and talk with your health care provider if you have any questions or concerns.  How do I get ready to quit?  When you decide to quit smoking, create a plan to help you succeed. Before you quit:  Pick a  date to quit. Set a date within the next 2 weeks to give you time to prepare.  Write down the reasons why you are quitting. Keep this list in places where you will see it often.  Tell your family, friends, and co-workers that you are quitting. Support from people you are close to can make quitting easier.  Talk with your health care provider about your options for quitting smoking.  Find out what treatment options are covered by your health insurance.  Identify people, places, things, and activities that make you want to smoke (triggers). Avoid them.  What first steps can I take to quit smoking?  Throw away all cigarettes at home, at work, and in your car.  Throw away smoking accessories, such as ashtrays and lighters.  Clean your car. Make sure to empty the ashtray.  Clean your home, including curtains and carpets.  What strategies can I use to quit smoking?  Talk with your health care provider about combining strategies, such as taking medicines while you are also receiving in-person counseling. Using these two strategies together makes you more likely to succeed in quitting than if you used either strategy on its own.  If you are pregnant or breastfeeding, talk with your health care provider about finding counseling or other support strategies to quit smoking. Do not take medicine to help you quit smoking unless your health care provider tells you to.  Quit right away  Quit smoking completely, instead of gradually reducing how much you smoke over a period of time. Stopping smoking right away may be more successful than gradually quitting.  Attend in-person counseling to help you build problem-solving skills. You are more likely to succeed in quitting if you attend counseling sessions regularly. Even short sessions of 10 minutes can be effective.  Take medicine  You may take medicines to help you quit smoking. Some medicines require a prescription. You can also purchase over-the-counter medicines. Medicines may  have nicotine in them to replace the nicotine in cigarettes. Medicines may:  Help to stop cravings.  Help to relieve withdrawal symptoms.  Your health care provider may recommend:  Nicotine patches, gum, or lozenges.  Nicotine inhalers or sprays.  Non-nicotine medicine that you take by mouth.  Find resources  Find resources and support systems that can help you quit smoking and remain smoke-free after you quit. These resources are most helpful when you use them often. They include:  Online chats with a counselor.  Telephone quitlines.  Printed self-help materials.  Support groups or group counseling.  Text messaging programs.  Mobile phone apps or applications. Use apps that can help you stick to your quit plan by providing reminders, tips, and encouragement. Examples of free services include Quit Guide from the CDC and smokefree.gov    What can I do to make it easier to quit?    Reach out to your family and friends for support and encouragement. Call telephone quitlines, such as 2-800-QUIT-NOW, reach out to support groups, or work with a counselor for support.  Ask people who smoke to avoid smoking around you.  Avoid places that trigger you to smoke, such as bars, parties, or smoke-break areas at work.  Spend time with people who do not smoke.  Lessen the stress in your life. Stress can be a smoking trigger for some people. To lessen stress, try:  Exercising regularly.  Doing deep-breathing exercises.  Doing yoga.  Meditating.  What benefits will I see if I quit smoking?  Over time, you should start to see positive results, such as:  Improved sense of smell and taste.  Decreased coughing and sore throat.  Slower heart rate.  Lower blood pressure.  Clearer and healthier skin.  The ability to breathe more easily.  Fewer sick days.  Summary  Quitting smoking can be very challenging. Do not get discouraged if you are not successful the first time. Some people need to make many attempts to quit before they achieve  long-term success.  When you decide to quit smoking, create a plan to help you succeed.  Quit smoking right away, not slowly over a period of time.  Find resources and support systems that can help you quit smoking and remain smoke-free after you quit.  This information is not intended to replace advice given to you by your health care provider. Make sure you discuss any questions you have with your health care provider.  Document Revised: 12/09/2022 Document Reviewed: 12/09/2022  [employer] Patient Education © 2024 [employer]  Tobacco Use Disorder  Tobacco use disorder (TUD) occurs when a person craves, seeks, and uses tobacco, regardless of the consequences. This disorder can cause problems with mental and physical health. It can affect your ability to have healthy relationships. It can also keep you from meeting your responsibilities at work, home, or school.  Tobacco products contain a dangerous chemical called nicotine. Nicotine triggers hormones that make the body feel stimulated and works on areas of the brain that make a person feel good. These effects can make the person depend on nicotine, which makes it hard to quit tobacco.  Tobacco may be:  Smoked as a cigarette or cigar.  Inhaled using vaping devices, such as e-cigarettes.  Smoked in a pipe or hookah.  Chewed as smokeless tobacco.  Inhaled into the nostrils as snuff.  What are the causes?  This condition is caused by using nicotine. Nicotine causes changes in your brain that make you want more and more. This is called addiction. This can make it hard to stop using tobacco once you start.  What are the signs or symptoms?  Symptoms of TUD may include:  Being unable to stop your tobacco use even after planned quit attempts.  Spending an abnormal amount of time getting or using tobacco.  Craving tobacco.  Tobacco use that:  Interferes with your work, school, or home life.  Interferes with your personal and social relationships.  Makes you give up  activities that you once enjoyed or found important.  Using tobacco even though you know that it is:  Dangerous or bad for your health or someone else's health.  Causing problems in your life.  Needing more and more of the substance to get the same effect (developing tolerance).  Using the substance to avoid unpleasant symptoms if you do not use the substance (withdrawal).  How is this diagnosed?  This condition may be diagnosed based on:  Your current and past tobacco use. Your health care provider may ask questions about how your tobacco use affects your life.  A physical exam.  You may be diagnosed with TUD if you have at least two symptoms within a 12-month period.  How is this treated?  This condition is treated by stopping tobacco use. Many people are unable to quit on their own and need help. Treatment may include:  Nicotine replacement therapy (NRT). NRT provides nicotine without the other harmful chemicals in tobacco. NRT gradually lowers the dosage of nicotine in the body and reduces withdrawal symptoms. NRT is available as:  Over-the-counter gums, lozenges, and skin patches.  Prescription mouth inhalers and nasal sprays.  Medicine that acts on the brain to reduce cravings and withdrawal symptoms.  A type of talk therapy that examines your triggers for tobacco use, how to avoid them, and how to cope with cravings (behavioral therapy).  Hypnosis. This may help with withdrawal symptoms.  Joining a support group for people coping with TUD.  The best treatment for TUD is usually a combination of medicine, talk therapy, and support groups. Recovery can be a long process. Many people start using tobacco again after stopping (relapse). If you relapse, it does not mean that treatment will not work.  Follow these instructions at home:    Lifestyle  Do not use any products that contain nicotine or tobacco. These products include cigarettes, chewing tobacco, and vaping devices, such as e-cigarettes. If you need help  quitting, ask your health care provider.  Avoid things that trigger tobacco use as much as you can. Triggers include people and situations that usually cause you to use tobacco.  Avoid drinks that contain caffeine, including coffee. These may worsen some withdrawal symptoms.  Find ways to manage stress. Wanting to smoke may cause stress, and stress can make you want to smoke. Relaxation techniques such as deep breathing, meditation, and yoga may help.  Attend support groups as needed. These groups are an important part of long-term recovery for many people.  General instructions  Take over-the-counter and prescription medicines only as told by your health care provider.  Check with your health care provider before taking any new prescription or over-the-counter medicines.  Decide on a friend, family member, or smoking quit-line (such as 1-800-QUIT-NOW in the [place] that you can call or text when you feel the urge to smoke or when you need help coping with cravings.  Keep all follow-up visits. This is important.  Contact a health care provider if:  You are not able to take your medicines as told by your health care provider.  Your symptoms get worse, even with treatment.  Summary  Tobacco use disorder (TUD) occurs when a person craves, seeks, and uses tobacco regardless of the consequences.  This condition may be diagnosed based on your current and past tobacco use and a physical exam.  Many people are unable to quit on their own and need help. Recovery can be a long process.  The most effective treatment for TUD is usually a combination of medicine, talk therapy, and support groups.  This information is not intended to replace advice given to you by your health care provider. Make sure you discuss any questions you have with your health care provider.  Document Revised: 12/13/2022 Document Reviewed: 12/13/2022  [employer] Patient Education © 2024 [employer]

## 2024-11-25 NOTE — PROGRESS NOTES
Subjective:     Encounter Date: 11/26/2024      Patient ID: Burt Mcdonald is a 74 y.o. male with known persistent atrial fibrillation with long-term anticoagulation, hypertension, COPD, chronic hypoxic respiratory failure with home oxygen, chronic systolic and diastolic heart failure, and ongoing tobacco and EtOH use.  He presents to the office today for routine CHF follow up.     Chief Complaint: CHF/AF Follow Up   Atrial Fibrillation  Presents for follow-up visit. Symptoms include shortness of breath (chronic). Symptoms are negative for bradycardia, chest pain, dizziness, hemodynamic instability, hypertension, hypotension, palpitations, syncope, tachycardia and weakness. The symptoms have been stable. Past medical history includes atrial fibrillation and CHF. There are no medication compliance problems.   Congestive Heart Failure  Presents for follow-up visit. Associated symptoms include fatigue and shortness of breath (chronic). Pertinent negatives include no chest pain, chest pressure, edema, near-syncope, palpitations or unexpected weight change. The symptoms have been stable. Compliance with total regimen is 51-75%. Compliance problems include adherence to exercise.  Compliance with diet is 51-75%. Compliance with exercise is 26-50%. Compliance with medications is %.     The patient was evaluated in our office on 5/24/2021 by Dr. Nick Snider.  He was referred to our office due to the finding of atrial fibrillation by his primary care physician. He had been started on anticoagulation by his PCP. Due to the new finding, unknown chronicity, the patient was set up for outpatient cardioversion. Prior to his office appointment he was placed in a Holter monitor for surveillance. He remained in atrial fibrillation throughout the entirety of the monitoring period.    On 6/2/2021 the patient had successful cardioversion. He displayed a sinus rhythm post cardioversion. He was discharged home in stable  condition. He has a history of hypertension and COPD with ongoing tobacco abuse with mild dyspnea on exertion. His shortness of breath is chronic due to his lung history. Post cardioversion the patient denied any change in his symptoms.    At the follow-up in July 2021 he was found to be back in atrial fibrillation on EKG and was stable and asymptomatic.  Since he had no reported symptoms that changed after cardioversion (and was feeling well without complaints), it was felt that rate control and anticoagulation was the best strategy.  He has been stable with routine outpatient follow ups.    He was hospitalized in November 2023. He was brought to the ED after increased weakness and confusion at home as well as shortness of breath. He was found to be in atrial fibrillation (baseline) with RVR and was admitted for treatment of COPD and pneumonia. During his hospital stay he was evaluated by electrophysiology. During his hospital stay medication adjustments were made for improvement of rate control. His beta blocker was stopped - due to his chronic lung disease and diltiazem was started. He was also started on digoxin. An echo was obtained, noting a slight drop in LVEF to 41-45%.  It was felt that his lung disease and acute illness were contributory to his elevated heart rates, but that his AF and current respiratory illness be contributing to his heart failure symptoms.  Also, it was discussed that long term he may continue to have issues with heart failure symptoms and worsening LVEF if he has ongoing bouts of uncontrolled heart rates. Therefore, other options, such as ablation, AVN ablation, and device implant were discussed with the patient.     He has seen EP in the outpatient setting to further discuss these options as he did not remember those discussions as in inpatient. He has elected to continue to pursue rate control and medication management of heart failure at this time.     He presents today for routine  "follow up. He has ongoing symptoms of chronic shortness of breath, worse with exertion, back pain, difficulty ambulating and standing at times.  He has also seen dermatology recently and had a skin cancer removed from his right ankle. He has required routine monitoring of that area due to poor healing. He has also had lower extremity swelling. He has also required wound care with dressing changes and this causes worsening swelling to the unwrapped portion of his leg, he indicates noting swelling in the thigh area of his right leg.     He indicates that he takes his lasix but not on a scheduled basis because he has urinary frequency after taking it. He only takes it and sometimes only half of it when he knows he will be home. He has abdominal bloating and feels like his \"air gets cut off\" when he is bending over. He has also had weight gain according to our scales.     He has neuropathy to his lower legs, he feels that the pain is worse with swelling because this makes his legs feel \"tight\".  He denies any chest pain, pressure, tightness. He continues to wear oxygen continuously at home.     He has seen neurosurgery due to his back pain and has significant degenerative issues and scoliosis but not felt to be a surgical candidate. He follows back up with [facility] tomorrow.     He is anticoagulated due to AF. He denies any bleeding issues at this time.    The following portions of the patient's history were reviewed and updated as appropriate: allergies, current medications, past family history, past medical history, past social history and past surgical history.     No Known Allergies      Current Outpatient Medications:     albuterol sulfate  (90 Base) MCG/ACT inhaler, Inhale 2 puffs Every 4 (Four) Hours As Needed for Wheezing or Shortness of Air., Disp: , Rfl:     digoxin (LANOXIN) 125 MCG tablet, Take 1 tablet by mouth Daily., Disp: 30 tablet, Rfl: 1    dilTIAZem CD (CARDIZEM CD) 360 MG 24 hr " capsule, Take 1 capsule by mouth Daily for 60 days., Disp: 30 capsule, Rfl: 1    DULoxetine (CYMBALTA) 60 MG capsule, Take 1 capsule by mouth Daily., Disp: , Rfl:     fenofibrate (TRICOR) 145 MG tablet, Take 1 tablet by mouth Daily., Disp: , Rfl:     gabapentin (NEURONTIN) 600 MG tablet, Take 1 tablet by mouth 3 (Three) Times a Day., Disp: 90 tablet, Rfl: 0    HYDROcodone-acetaminophen (NORCO)  MG per tablet, Take 1 tablet by mouth Every 8 (Eight) Hours As Needed for Severe Pain., Disp: 90 tablet, Rfl: 0    lisinopril (PRINIVIL,ZESTRIL) 5 MG tablet, Take 1 tablet by mouth Daily for 60 days., Disp: 30 tablet, Rfl: 1    Mucus Relief 600 MG 12 hr tablet, , Disp: , Rfl:     omeprazole (priLOSEC) 20 MG capsule, Take 1 capsule by mouth Daily., Disp: , Rfl:     rivaroxaban (XARELTO) 20 MG tablet, Take 1 tablet by mouth Daily., Disp: , Rfl:     rosuvastatin (CRESTOR) 10 MG tablet, Take 1 tablet by mouth Daily., Disp: , Rfl:     tamsulosin (FLOMAX) 0.4 MG capsule 24 hr capsule, Take 1 capsule by mouth Daily., Disp: , Rfl:     Thiamine Mononitrate (RA Vitamin B-1) 100 MG tablet, Take  by mouth., Disp: , Rfl:     Trelegy Ellipta 200-62.5-25 MCG/ACT aerosol powder , Inhale 1 puff Daily., Disp: , Rfl:     triazolam (HALCION) 0.25 MG tablet, , Disp: , Rfl:     furosemide (LASIX) 80 MG tablet, Take 1 tablet by mouth Daily for 60 days. (Patient taking differently: Take 1 tablet by mouth Daily. 1/2 tablet a day), Disp: 30 tablet, Rfl: 1    Review of Systems   Constitutional: Positive for fatigue, malaise/fatigue and weight gain. Negative for diaphoresis and unexpected weight change.   HENT:  Negative for congestion and nosebleeds.    Cardiovascular:  Positive for dyspnea on exertion ( chronic ) and leg swelling. Negative for chest pain, irregular heartbeat, near-syncope, orthopnea, palpitations, paroxysmal nocturnal dyspnea and syncope.   Respiratory:  Positive for shortness of breath (chronic). Negative for cough and  wheezing.    Hematologic/Lymphatic: Negative for bleeding problem. Bruises/bleeds easily.   Skin:  Positive for poor wound healing and skin cancer.   Musculoskeletal:  Positive for back pain ( radiculopathy) and stiffness.   Gastrointestinal:  Negative for bloating and nausea.   Neurological:  Positive for paresthesias ( burning to BLE R>L ). Negative for dizziness, focal weakness, headaches, light-headedness, loss of balance and weakness.   Psychiatric/Behavioral:  Negative for altered mental status.        Procedures     Objective:     Vitals reviewed.   Constitutional:       General: Awake. Not in acute distress.     Appearance: Well-developed, well-groomed, overweight and not in distress. Ill-appearing and chronically ill-appearing. Not diaphoretic.      Interventions: Nasal cannula in place.   Eyes:      General:         Right eye: No discharge.         Left eye: No discharge.   HENT:      Head: Normocephalic and atraumatic.    Mouth/Throat:      Pharynx: No oropharyngeal exudate.   Pulmonary:      Effort: Pulmonary effort is normal. No respiratory distress.      Breath sounds: Decreased air movement present. Decreased breath sounds present. Scattered Wheezing present. No rhonchi. No rales.   Cardiovascular:      Normal rate. Irregularly irregular rhythm.      Murmurs: There is no murmur.      No gallop.  No rub.   Edema:     Peripheral edema present.     Ankle: edema of the right ankle.     Feet: edema of the right foot.  Musculoskeletal:      Cervical back: Normal range of motion and neck supple. Skin:     General: Skin is warm and dry.      Findings: Lesion (RLL) present.   Neurological:      Mental Status: Alert, oriented to person, place, and time and oriented to person, place and time.   Psychiatric:         Attention and Perception: Attention normal.         Mood and Affect: Mood normal.         Speech: Speech normal.         Behavior: Behavior normal. Behavior is cooperative.         Thought Content:  "Thought content normal.         Cognition and Memory: Cognition normal.         Judgment: Judgment normal.     /62 (BP Location: Left arm, Patient Position: Sitting, Cuff Size: Adult)   Pulse 77   Ht 170.2 cm (67.01\")   Wt 78.9 kg (174 lb)   BMI 27.25 kg/m²     Lab Review:       Results for orders placed during the hospital encounter of 11/16/23    Adult Transthoracic Echo Complete w/ Color, Spectral and Contrast if Necessary Per Protocol    Interpretation Summary    Left ventricular systolic function is mildly decreased. Left ventricular ejection fraction appears to be 41 - 45%.    Left ventricular wall thickness is consistent with mild concentric hypertrophy.    Moderately reduced right ventricular systolic function noted.    Biatrial enlargement is noted.    Mild mitral valve regurgitation is present.    Mild tricuspid valve regurgitation is present. Estimated right ventricular systolic pressure from tricuspid regurgitation is markedly elevated (>55 mmHg).    A small to moderate size circumferential pericardial effusion is noted with the larger amount of fluid seen posterior to the heart with no obvious echocardiographic signs of tamponade.        Assessment:          Diagnosis Plan   1. Chronic combined systolic and diastolic congestive heart failure        2. Permanent atrial fibrillation        3. Current use of long term anticoagulation        4. Essential hypertension        5. Mixed hyperlipidemia        6. Chronic respiratory failure with hypoxia, on home O2 therapy        7. Tobacco abuse        8. ETOH abuse                       Plan:       - Chronic systolic and diastolic heart failure: NYHA Class III - fatigue and shortness of breath at baseline due to other contributing factors - respiratory failure, COPD, and chronic back pain. He is currently managed on diuretic therapy - but is only taking this a few times a week and sometimes only a half a tablet. He had been on beta blocker therapy but " it was felt due to his lung disease that avoiding beta blocker therapy was ideal and he was started on diltiazem and digoxin by EP.  He is currently on lisinopril and lasix. He has swelling to his right lower leg, where he has had recent skin surgery and wound care. He also complains of abdominal distention, worsening shortness of breath.  Add spironolactone 25 mg daily. Continue lasix but have asked him to take 40 mg daily (1/2 tablet) consistently.  Consider transition to Entresto in future.     - Atrial fibrillation: Permanent. Long standing af with previous cardioversion and return of atrial fibrillation ar follow-up without change in symptoms so he has been managed from a rate control strategy moving forward. He remains anticoagulated with Xarelto and denies any bleeding issues.  He has seen EP in the outpatient setting, but the patient has chosen to remain conservative with rate control at this time. He continues to feel stable without further issues of known elevated heart rates. Current use of digoxin and diltiazem for rate control.    [id number] SCORE   [id number] Score: 3 (11/26/2024 10:36 AM)    - Anticoagulation: Continue Xarelto 20 mg daily with food for stroke risk reduction in the setting of persistent atrial fibrillation.    - Hypertension: Stable.  His blood pressure is well controlled. He follows with his PCP office routinely for management.     - Hyperlipidemia: The patient has been started on rosuvastatin 10 mg daily by his PCP office.  He has routine labs drawn at their facility.  He is tolerating this medication without complaints.     -Chronic respiratory failure: Currently on home oxygen.     -Tobacco abuse/etoh: Advised smoking cessation. He understands that his alcohol use may be contributing to his cardiomyopathy/heart failure/neuropathy as well.       Add spironolactone 25 mg daily  Take lasix 40 mg daily on a routine basis, extra 40 mg if needed.   Consider Entresto In future.    Follow up in 3 months.

## 2024-11-26 ENCOUNTER — OFFICE VISIT (OUTPATIENT)
Dept: CARDIOLOGY | Facility: CLINIC | Age: 74
End: 2024-11-26
Payer: COMMERCIAL

## 2024-11-26 VITALS
HEART RATE: 77 BPM | DIASTOLIC BLOOD PRESSURE: 62 MMHG | HEIGHT: 67 IN | BODY MASS INDEX: 27.31 KG/M2 | WEIGHT: 174 LBS | SYSTOLIC BLOOD PRESSURE: 128 MMHG

## 2024-11-26 DIAGNOSIS — I10 ESSENTIAL HYPERTENSION: Chronic | ICD-10-CM

## 2024-11-26 DIAGNOSIS — E78.2 MIXED HYPERLIPIDEMIA: Chronic | ICD-10-CM

## 2024-11-26 DIAGNOSIS — I50.42 CHRONIC COMBINED SYSTOLIC AND DIASTOLIC CONGESTIVE HEART FAILURE: Primary | Chronic | ICD-10-CM

## 2024-11-26 DIAGNOSIS — Z72.0 TOBACCO ABUSE: Chronic | ICD-10-CM

## 2024-11-26 DIAGNOSIS — I48.21 PERMANENT ATRIAL FIBRILLATION: Chronic | ICD-10-CM

## 2024-11-26 DIAGNOSIS — Z79.01 CURRENT USE OF LONG TERM ANTICOAGULATION: Chronic | ICD-10-CM

## 2024-11-26 DIAGNOSIS — F10.10 ETOH ABUSE: Chronic | ICD-10-CM

## 2024-11-26 DIAGNOSIS — J96.11 CHRONIC RESPIRATORY FAILURE WITH HYPOXIA, ON HOME O2 THERAPY: Chronic | ICD-10-CM

## 2024-11-26 DIAGNOSIS — Z99.81 CHRONIC RESPIRATORY FAILURE WITH HYPOXIA, ON HOME O2 THERAPY: Chronic | ICD-10-CM

## 2024-11-26 RX ORDER — SPIRONOLACTONE 25 MG/1
25 TABLET ORAL DAILY
Qty: 90 TABLET | Refills: 3 | Status: SHIPPED | OUTPATIENT
Start: 2024-11-26

## 2024-11-26 RX ORDER — FUROSEMIDE 80 MG/1
40 TABLET ORAL DAILY
Qty: 45 TABLET | Refills: 3 | Status: SHIPPED | OUTPATIENT
Start: 2024-11-26

## 2025-03-24 ENCOUNTER — OFFICE VISIT (OUTPATIENT)
Dept: CARDIOLOGY | Facility: CLINIC | Age: 75
End: 2025-03-24
Payer: MEDICARE

## 2025-03-24 VITALS
HEART RATE: 71 BPM | SYSTOLIC BLOOD PRESSURE: 100 MMHG | HEIGHT: 67 IN | BODY MASS INDEX: 24.96 KG/M2 | DIASTOLIC BLOOD PRESSURE: 50 MMHG | WEIGHT: 159 LBS

## 2025-03-24 DIAGNOSIS — J96.11 CHRONIC RESPIRATORY FAILURE WITH HYPOXIA, ON HOME O2 THERAPY: Chronic | ICD-10-CM

## 2025-03-24 DIAGNOSIS — F10.10 ETOH ABUSE: Chronic | ICD-10-CM

## 2025-03-24 DIAGNOSIS — Z79.01 CURRENT USE OF LONG TERM ANTICOAGULATION: Chronic | ICD-10-CM

## 2025-03-24 DIAGNOSIS — I48.21 PERMANENT ATRIAL FIBRILLATION: Chronic | ICD-10-CM

## 2025-03-24 DIAGNOSIS — I50.42 CHRONIC COMBINED SYSTOLIC AND DIASTOLIC CONGESTIVE HEART FAILURE: Primary | Chronic | ICD-10-CM

## 2025-03-24 DIAGNOSIS — Z99.81 CHRONIC RESPIRATORY FAILURE WITH HYPOXIA, ON HOME O2 THERAPY: Chronic | ICD-10-CM

## 2025-03-24 DIAGNOSIS — E78.2 MIXED HYPERLIPIDEMIA: Chronic | ICD-10-CM

## 2025-03-24 DIAGNOSIS — I10 ESSENTIAL HYPERTENSION: Chronic | ICD-10-CM

## 2025-03-24 DIAGNOSIS — Z72.0 TOBACCO ABUSE: Chronic | ICD-10-CM

## 2025-03-24 PROCEDURE — 99214 OFFICE O/P EST MOD 30 MIN: CPT | Performed by: NURSE PRACTITIONER

## 2025-03-24 PROCEDURE — 1159F MED LIST DOCD IN RCRD: CPT | Performed by: NURSE PRACTITIONER

## 2025-03-24 PROCEDURE — 3078F DIAST BP <80 MM HG: CPT | Performed by: NURSE PRACTITIONER

## 2025-03-24 PROCEDURE — 93000 ELECTROCARDIOGRAM COMPLETE: CPT | Performed by: NURSE PRACTITIONER

## 2025-03-24 PROCEDURE — 1160F RVW MEDS BY RX/DR IN RCRD: CPT | Performed by: NURSE PRACTITIONER

## 2025-03-24 PROCEDURE — 3074F SYST BP LT 130 MM HG: CPT | Performed by: NURSE PRACTITIONER

## 2025-03-24 NOTE — PROGRESS NOTES
Subjective:     Encounter Date: 03/24/2025      Patient ID: Burt Mcdonald is a 74 y.o. male with known persistent atrial fibrillation with long-term anticoagulation, hypertension, COPD, chronic hypoxic respiratory failure with home oxygen, chronic systolic and diastolic heart failure, and ongoing tobacco and EtOH use.  He presents to the office today for routine CHF follow up.     Chief Complaint: CHF/AF Follow Up   Atrial Fibrillation  Presents for follow-up visit. Symptoms include shortness of breath (chronic). Symptoms are negative for bradycardia, chest pain, dizziness, hemodynamic instability, hypertension, hypotension, palpitations, syncope, tachycardia and weakness. The symptoms have been stable. Past medical history includes atrial fibrillation and CHF. There are no medication compliance problems.   Congestive Heart Failure  Presents for follow-up visit. Associated symptoms include edema, fatigue and shortness of breath (chronic). Pertinent negatives include no chest pain, chest pressure, near-syncope, orthopnea, palpitations, paroxysmal nocturnal dyspnea or unexpected weight change. The symptoms have been stable. Compliance with total regimen is 51-75%. Compliance problems include adherence to exercise and adherence to diet.  Compliance with diet is 26-50%. Compliance with exercise is 0-25%. Compliance with medications is %.     The patient was evaluated in our office on 5/24/2021 by Dr. Nick Snider.  He was referred to our office due to the finding of atrial fibrillation by his primary care physician. He had been started on anticoagulation by his PCP. Due to the new finding, unknown chronicity, the patient was set up for outpatient cardioversion. Prior to his office appointment he was placed in a Holter monitor for surveillance. He remained in atrial fibrillation throughout the entirety of the monitoring period.    On 6/2/2021 the patient had successful cardioversion. He displayed a  sinus rhythm post cardioversion. He was discharged home in stable condition. He has a history of hypertension and COPD with ongoing tobacco abuse with mild dyspnea on exertion. His shortness of breath is chronic due to his lung history. Post cardioversion the patient denied any change in his symptoms.    At the follow-up in July 2021 he was found to be back in atrial fibrillation on EKG and was stable and asymptomatic.  Since he had no reported symptoms that changed after cardioversion (and was feeling well without complaints), it was felt that rate control and anticoagulation was the best strategy.  He has been stable with routine outpatient follow ups.    He was hospitalized in November 2023. He was brought to the ED after increased weakness and confusion at home as well as shortness of breath. He was found to be in atrial fibrillation (baseline) with RVR and was admitted for treatment of COPD and pneumonia. During his hospital stay he was evaluated by electrophysiology. During his hospital stay medication adjustments were made for improvement of rate control. His beta blocker was stopped - due to his chronic lung disease and diltiazem was started. He was also started on digoxin. An echo was obtained, noting a slight drop in LVEF to 41-45%.  It was felt that his lung disease and acute illness were contributory to his elevated heart rates, but that his AF and current respiratory illness be contributing to his heart failure symptoms.  Also, it was discussed that long term he may continue to have issues with heart failure symptoms and worsening LVEF if he has ongoing bouts of uncontrolled heart rates. Therefore, other options, such as ablation, AVN ablation, and device implant were discussed with the patient.     He has seen EP in the outpatient setting to further discuss these options as he did not remember those discussions as in inpatient. He has elected to continue to pursue rate control and medication management  of heart failure at this time.     He presents today for routine follow up. He has ongoing symptoms of chronic shortness of breath, worse with exertion, chronic back pain but has had much improvement with ambulation as he is started utilizing a rolling walker.   He has lower leg edema on exam today but tells me this comes and goes and isn't always present. He is compliant with his diuretic therapy, but endorses high sodium diet.     He has been using lasix 40 daily, with extra 40 mg most days. He has rate controlled AF and mild LV systolic dysfunction noted on prior echo. He is anticoagulated and denies any known blood loss.       The following portions of the patient's history were reviewed and updated as appropriate: allergies, current medications, past family history, past medical history, past social history and past surgical history.     No Known Allergies      Current Outpatient Medications:     albuterol sulfate  (90 Base) MCG/ACT inhaler, Inhale 2 puffs Every 4 (Four) Hours As Needed for Wheezing or Shortness of Air., Disp: , Rfl:     digoxin (LANOXIN) 125 MCG tablet, Take 1 tablet by mouth Daily., Disp: 30 tablet, Rfl: 1    dilTIAZem CD (CARDIZEM CD) 360 MG 24 hr capsule, Take 1 capsule by mouth Daily for 60 days., Disp: 30 capsule, Rfl: 1    DULoxetine (CYMBALTA) 60 MG capsule, Take 1 capsule by mouth Daily., Disp: , Rfl:     fenofibrate (TRICOR) 145 MG tablet, Take 1 tablet by mouth Daily., Disp: , Rfl:     furosemide (LASIX) 80 MG tablet, Take 0.5 tablets by mouth Daily., Disp: 45 tablet, Rfl: 3    gabapentin (NEURONTIN) 600 MG tablet, Take 1 tablet by mouth 3 (Three) Times a Day., Disp: 90 tablet, Rfl: 0    HYDROcodone-acetaminophen (NORCO)  MG per tablet, Take 1 tablet by mouth Every 8 (Eight) Hours As Needed for Severe Pain., Disp: 90 tablet, Rfl: 0    lisinopril (PRINIVIL,ZESTRIL) 5 MG tablet, Take 1 tablet by mouth Daily for 60 days., Disp: 30 tablet, Rfl: 1    Mucus Relief 600 MG 12 hr  tablet, , Disp: , Rfl:     omeprazole (priLOSEC) 20 MG capsule, Take 1 capsule by mouth Daily., Disp: , Rfl:     rivaroxaban (XARELTO) 20 MG tablet, Take 1 tablet by mouth Daily., Disp: , Rfl:     rosuvastatin (CRESTOR) 10 MG tablet, Take 1 tablet by mouth Daily., Disp: , Rfl:     spironolactone (ALDACTONE) 25 MG tablet, Take 1 tablet by mouth Daily., Disp: 90 tablet, Rfl: 3    tamsulosin (FLOMAX) 0.4 MG capsule 24 hr capsule, Take 1 capsule by mouth Daily., Disp: , Rfl:     Thiamine Mononitrate (RA Vitamin B-1) 100 MG tablet, Take  by mouth., Disp: , Rfl:     Trelegy Ellipta 200-62.5-25 MCG/ACT aerosol powder , Inhale 1 puff Daily., Disp: , Rfl:     triazolam (HALCION) 0.25 MG tablet, , Disp: , Rfl:     Review of Systems   Constitutional: Positive for fatigue, malaise/fatigue and weight gain. Negative for diaphoresis and unexpected weight change.   HENT:  Negative for congestion and nosebleeds.    Cardiovascular:  Positive for dyspnea on exertion ( chronic ) and leg swelling. Negative for chest pain, irregular heartbeat, near-syncope, orthopnea, palpitations, paroxysmal nocturnal dyspnea and syncope.   Respiratory:  Positive for shortness of breath (chronic). Negative for cough and wheezing.    Hematologic/Lymphatic: Negative for bleeding problem. Bruises/bleeds easily.   Skin:  Positive for poor wound healing and skin cancer.   Musculoskeletal:  Positive for back pain ( radiculopathy) and stiffness.   Gastrointestinal:  Negative for bloating and nausea.   Neurological:  Positive for paresthesias ( burning to BLE R>L ). Negative for dizziness, focal weakness, headaches, light-headedness, loss of balance and weakness.   Psychiatric/Behavioral:  Negative for altered mental status.          ECG 12 Lead    Date/Time: 3/24/2025 3:47 PM  Performed by: Christelle Mohamud APRN    Authorized by: Christelle Mohamud APRN  Comparison: compared with previous ECG from 8/5/2024  Similar to previous ECG  Rhythm: atrial  "fibrillation  Rate: normal  BPM: 71  Conduction: conduction normal  QRS axis: right  Other findings: poor R wave progression    Clinical impression: abnormal EKG           Objective:     Vitals reviewed.   Constitutional:       General: Awake. Not in acute distress.     Appearance: Well-developed, well-groomed, overweight and not in distress. Chronically ill-appearing. Not diaphoretic.   Eyes:      General:         Right eye: No discharge.         Left eye: No discharge.   HENT:      Head: Normocephalic and atraumatic.    Mouth/Throat:      Pharynx: No oropharyngeal exudate.   Pulmonary:      Effort: Pulmonary effort is normal. No respiratory distress.      Breath sounds: No wheezing. Rhonchi present. No rales.   Cardiovascular:      Normal rate. Irregularly irregular rhythm.      Murmurs: There is no murmur.      No gallop.  No rub.   Edema:     Peripheral edema present.     Pretibial: bilateral edema of the pretibial area.     Ankle: bilateral edema of the ankle.  Musculoskeletal:      Cervical back: Normal range of motion and neck supple. Skin:     General: Skin is warm and dry.   Neurological:      Mental Status: Alert, oriented to person, place, and time and oriented to person, place and time.   Psychiatric:         Attention and Perception: Attention normal.         Mood and Affect: Mood normal.         Speech: Speech normal.         Behavior: Behavior normal. Behavior is cooperative.         Thought Content: Thought content normal.         Cognition and Memory: Cognition normal.         Judgment: Judgment normal.     /50   Pulse 71   Ht 170.2 cm (67.01\")   Wt 72.1 kg (159 lb)   BMI 24.90 kg/m²     Lab Review:       Results for orders placed during the hospital encounter of 11/16/23    Adult Transthoracic Echo Complete w/ Color, Spectral and Contrast if Necessary Per Protocol    Interpretation Summary    Left ventricular systolic function is mildly decreased. Left ventricular ejection fraction " appears to be 41 - 45%.    Left ventricular wall thickness is consistent with mild concentric hypertrophy.    Moderately reduced right ventricular systolic function noted.    Biatrial enlargement is noted.    Mild mitral valve regurgitation is present.    Mild tricuspid valve regurgitation is present. Estimated right ventricular systolic pressure from tricuspid regurgitation is markedly elevated (>55 mmHg).    A small to moderate size circumferential pericardial effusion is noted with the larger amount of fluid seen posterior to the heart with no obvious echocardiographic signs of tamponade.        Assessment:          Diagnosis Plan   1. Chronic combined systolic and diastolic congestive heart failure        2. Permanent atrial fibrillation        3. Current use of long term anticoagulation        4. Essential hypertension        5. Mixed hyperlipidemia        6. Chronic respiratory failure with hypoxia, on home O2 therapy        7. Tobacco abuse        8. ETOH abuse                 Plan:       - Chronic systolic and diastolic heart failure: NYHA Class III - fatigue and shortness of breath at baseline due to other contributing factors - respiratory failure, COPD, and chronic back pain. He is currently managed on diuretic therapy and is stable. His swelling and complaints have improved with use of scheduled diuretics. His diet is likely the biggest contributor to his swelling.  He had been on beta blocker therapy but it was felt due to his lung disease that avoiding beta blocker therapy was ideal and he was started on diltiazem and digoxin by EP.     - Atrial fibrillation: Permanent. Long standing AF with previous cardioversion and return of atrial fibrillation at follow-up without change in symptoms so he has been managed from a rate control strategy moving forward. He remains anticoagulated with Xarelto and denies any bleeding issues.  He has seen EP in the outpatient setting, but the patient has chosen to remain  conservative with rate control at this time. He continues to feel stable without further issues of known elevated heart rates. Current use of digoxin and diltiazem for rate control per EP.    [id number] SCORE   [id number] Score: 3 (3/24/2025  8:31 AM)      - Anticoagulation: Continue Xarelto 20 mg daily with food for stroke risk reduction in the setting of persistent atrial fibrillation.    - Hypertension: Stable.  His blood pressure is well controlled. He follows with his PCP office routinely for management.     - Hyperlipidemia: The patient has been started on rosuvastatin 10 mg daily by his PCP office.  He has routine labs drawn at their facility.  He is tolerating this medication without complaints.     -Chronic respiratory failure: Currently on home oxygen.     -Tobacco abuse/etoh: Advised smoking cessation. He understands that his alcohol use may be contributing to his cardiomyopathy/heart failure/neuropathy as well.       Continue current medications  CHF diet discussed regarding sodium content and fluid intake.   Follow up in 6 months, sooner if worse.     I attest that all portions of this note have been reviewed and updated to reflect the patient's current status.            Aleshia Huerta

## 2025-05-30 ENCOUNTER — OFFICE VISIT (OUTPATIENT)
Dept: CARDIOLOGY | Facility: CLINIC | Age: 75
End: 2025-05-30
Payer: MEDICARE

## 2025-05-30 VITALS
SYSTOLIC BLOOD PRESSURE: 116 MMHG | HEIGHT: 67 IN | OXYGEN SATURATION: 94 % | DIASTOLIC BLOOD PRESSURE: 52 MMHG | HEART RATE: 76 BPM | WEIGHT: 156 LBS | BODY MASS INDEX: 24.48 KG/M2

## 2025-05-30 DIAGNOSIS — F10.10 ETOH ABUSE: ICD-10-CM

## 2025-05-30 DIAGNOSIS — I48.21 PERMANENT ATRIAL FIBRILLATION: Primary | ICD-10-CM

## 2025-05-30 DIAGNOSIS — I50.42 CHRONIC COMBINED SYSTOLIC AND DIASTOLIC CONGESTIVE HEART FAILURE: ICD-10-CM

## 2025-05-30 PROCEDURE — 3074F SYST BP LT 130 MM HG: CPT

## 2025-05-30 PROCEDURE — 3078F DIAST BP <80 MM HG: CPT

## 2025-05-30 PROCEDURE — 99214 OFFICE O/P EST MOD 30 MIN: CPT

## 2025-05-30 PROCEDURE — 93000 ELECTROCARDIOGRAM COMPLETE: CPT

## 2025-05-30 NOTE — PROGRESS NOTES
[facility] Electrophysiology   Reason For Visit:  Atrial Fibrillation     Subjective        EP Problems:  1.  Longstanding persistent atrial fibrillation     Cardiology Problems:  1.  Hypertension  2.  Chronic systolic heart failure  3.  Moderate pericardial effusion  4.  Likely pulmonary hypertension     Medical Problems:  1.  COPD on home oxygen, severe  2.  Tobacco use disorder  3.  Alcohol use disorder  4.  Lumbar stenosis          Burt Mcdonald is a 74 y.o. male with above pertinent PMH who presents for follow-up of persistent atrial fibrillation.    Patient last seen in the EP clinic in August 2024.  He was doing reasonably well at that time.  Offered AV node ablation with pacemaker implant and he declined due to his stable symptoms.  He has followed up 3 times with his primary cardiology team since then.  Has had adjustments to his GDMT.    Since his previous appointment he has been doing well.  Denies any significant issues with his atrial fibrillation.  Denies any palpitations or fluttering.  He feels like his activity tolerance is stable.  Stable dyspnea on exertion.  He does not feel like he is had any worsening heart failure symptoms.  Does have bruising of the skin but no other significant bleeding concerns.  Does have intermittent dizziness but no near-syncope or syncope.      ROS: Pertinent findings as noted above        Pertinent past medical, surgical, family, and social history were reviewed.      Current Outpatient Medications:     albuterol sulfate  (90 Base) MCG/ACT inhaler, Inhale 2 puffs Every 4 (Four) Hours As Needed for Wheezing or Shortness of Air., Disp: , Rfl:     digoxin (LANOXIN) 125 MCG tablet, Take 1 tablet by mouth Daily., Disp: 30 tablet, Rfl: 1    dilTIAZem CD (CARDIZEM CD) 360 MG 24 hr capsule, Take 1 capsule by mouth Daily for 60 days., Disp: 30 capsule, Rfl: 1    DULoxetine (CYMBALTA) 60 MG capsule, Take 1 capsule by mouth Daily., Disp: , Rfl:     fenofibrate  "(TRICOR) 145 MG tablet, Take 1 tablet by mouth Daily., Disp: , Rfl:     furosemide (LASIX) 80 MG tablet, Take 0.5 tablets by mouth Daily., Disp: 45 tablet, Rfl: 3    gabapentin (NEURONTIN) 600 MG tablet, Take 1 tablet by mouth 3 (Three) Times a Day., Disp: 90 tablet, Rfl: 0    HYDROcodone-acetaminophen (NORCO)  MG per tablet, Take 1 tablet by mouth Every 8 (Eight) Hours As Needed for Severe Pain., Disp: 90 tablet, Rfl: 0    lisinopril (PRINIVIL,ZESTRIL) 5 MG tablet, Take 1 tablet by mouth Daily for 60 days., Disp: 30 tablet, Rfl: 1    Mucus Relief 600 MG 12 hr tablet, , Disp: , Rfl:     omeprazole (priLOSEC) 20 MG capsule, Take 1 capsule by mouth Daily., Disp: , Rfl:     rivaroxaban (XARELTO) 20 MG tablet, Take 1 tablet by mouth Daily., Disp: , Rfl:     rosuvastatin (CRESTOR) 10 MG tablet, Take 1 tablet by mouth Daily., Disp: , Rfl:     spironolactone (ALDACTONE) 25 MG tablet, Take 1 tablet by mouth Daily., Disp: 90 tablet, Rfl: 3    tamsulosin (FLOMAX) 0.4 MG capsule 24 hr capsule, Take 1 capsule by mouth Daily., Disp: , Rfl:     Thiamine Mononitrate (RA Vitamin B-1) 100 MG tablet, Take  by mouth., Disp: , Rfl:     Trelegy Ellipta 200-62.5-25 MCG/ACT aerosol powder , Inhale 1 puff Daily., Disp: , Rfl:     triazolam (HALCION) 0.25 MG tablet, , Disp: , Rfl:      Objective   Vital Signs:  /52   Pulse 76   Ht 170.2 cm (67.01\")   Wt 70.8 kg (156 lb)   SpO2 94%   BMI 24.43 kg/m²   Estimated body mass index is 24.43 kg/m² as calculated from the following:    Height as of this encounter: 170.2 cm (67.01\").    Weight as of this encounter: 70.8 kg (156 lb).      Constitutional:       Appearance: Healthy appearance. Not in distress.   Pulmonary:      Effort: Pulmonary effort is normal.      Breath sounds: Normal breath sounds and air entry.   Cardiovascular:      PMI at left midclavicular line. Normal rate. Irregularly irregular rhythm. Normal S1. Normal S2.       Murmurs: There is no murmur.      No gallop.  " No click. No rub.   Pulses:     Intact distal pulses.   Edema:     Peripheral edema present.     Ankle: bilateral 1+ edema of the ankle.     Feet: bilateral 1+ edema of the feet.  Neurological:      Mental Status: Alert and oriented to person, place and time.        Result Review :           ECG 12 Lead    Date/Time: 5/30/2025 10:51 AM  Performed by: Brendan King APRN    Authorized by: Brendan King APRN  Comparison: compared with previous ECG from 3/24/2025  Similar to previous ECG  Comparison to previous ECG: Atrial fibrillation with PAC  Rhythm: atrial fibrillation  Ectopy: atrial premature contractions  Rate: normal  QRS axis: normal    Clinical impression: abnormal EKG            Assessment and Plan   Diagnoses and all orders for this visit:    1. Permanent atrial fibrillation (Primary)  2. Chronic combined systolic and diastolic congestive heart failure  3. ETOH abuse  -In rate controlled atrial fibrillation today  - No significant heart failure complaints as of late  - Continue management of underlying heart failure by primary cardiology team  - Continue Cardizem 360 mg daily, digoxin 125 mcg daily, and Xarelto 20 mg daily for management of his atrial fibrillation  - Patient not interested in advanced procedures given his lack of symptoms.  This seems reasonable.  - Follow-up in the EP clinic in 1 year                   I spent 2 minutes on the separately reported service of EKG interpretation. This time is not included in the time used to support the E/M service also reported today.      Follow Up   No follow-ups on file.  Patient was given instructions and counseling regarding his condition or for health maintenance advice. Please see specific information pulled into the AVS if appropriate.       Part of this note may be an electronic transcription/translation of spoken language to printed text using the Dragon Dictation System.

## 2025-07-21 ENCOUNTER — TRANSCRIBE ORDERS (OUTPATIENT)
Dept: ADMINISTRATIVE | Facility: HOSPITAL | Age: 75
End: 2025-07-21
Payer: MEDICARE

## 2025-07-21 DIAGNOSIS — J84.10 PULMONARY FIBROSIS: Primary | ICD-10-CM

## 2025-08-08 ENCOUNTER — HOSPITAL ENCOUNTER (OUTPATIENT)
Dept: PULMONOLOGY | Facility: HOSPITAL | Age: 75
Discharge: HOME | End: 2025-08-08
Payer: OTHER MISCELLANEOUS

## 2025-08-08 DIAGNOSIS — J84.10 PULMONARY FIBROSIS: ICD-10-CM

## 2025-08-08 PROCEDURE — 94618 PULMONARY STRESS TESTING: CPT

## 2025-08-08 PROCEDURE — 94729 DIFFUSING CAPACITY: CPT

## 2025-08-08 PROCEDURE — 94726 PLETHYSMOGRAPHY LUNG VOLUMES: CPT

## 2025-08-08 PROCEDURE — 94060 EVALUATION OF WHEEZING: CPT

## 2025-08-08 RX ORDER — ALBUTEROL SULFATE 0.83 MG/ML
2.5 SOLUTION RESPIRATORY (INHALATION) ONCE
Status: COMPLETED | OUTPATIENT
Start: 2025-08-08 | End: 2025-08-08

## 2025-08-08 RX ADMIN — ALBUTEROL SULFATE 2.5 MG: 2.5 SOLUTION RESPIRATORY (INHALATION) at 08:50

## (undated) DEVICE — HYPODERMIC SAFETY NEEDLE: Brand: MAGELLAN

## (undated) DEVICE — GLOVE SURG SZ 75 L12IN FNGR THK87MIL DK GRN LTX FREE ISOLEX

## (undated) DEVICE — NEURO CDS

## (undated) DEVICE — C-ARM: Brand: UNBRANDED

## (undated) DEVICE — SUTURE VCRL SZ 1 L18IN ABSRB UD L36MM CT-1 1/2 CIR J841D

## (undated) DEVICE — SOLUTION IV 250ML 0.9% SOD CHL PH 5 INJ USP VIAFLX PLAS

## (undated) DEVICE — UNDERGLOVE SURG SZ 8 FNGR THK0.21MIL GRN LTX BEAD CUF

## (undated) DEVICE — DRESSING FOAM W10XL20CM ANTIMIC SELF ADH SAFETAC TECHNOLOGY

## (undated) DEVICE — MICRO KOVER: Brand: UNBRANDED

## (undated) DEVICE — 4.0MM PRECISION ROUND

## (undated) DEVICE — GLOVE SURG SZ 75 L12IN FNGR THK94MIL TRNSLUC YEL LTX

## (undated) DEVICE — FORCEP BPLR IRIS

## (undated) DEVICE — SUTURE VCRL SZ 2-0 L18IN ABSRB UD CT-1 L36MM 1/2 CIR J839D

## (undated) DEVICE — INSERT CUSHION HEAD PRONEVIEW

## (undated) DEVICE — 3M™ STERI-STRIP™ REINFORCED ADHESIVE SKIN CLOSURES, R1547, 1/2 IN X 4 IN (12 MM X 100 MM), 6 STRIPS/ENVELOPE: Brand: 3M™ STERI-STRIP™

## (undated) DEVICE — GAUZE,SPONGE,4"X4",8PLY,STRL,LF,10/TRAY: Brand: MEDLINE

## (undated) DEVICE — C-ARMOR C-ARM EQUIPMENT COVERS CLEAR STERILE UNIVERSAL FIT 12 PER CASE: Brand: C-ARMOR

## (undated) DEVICE — ARGYLEPOLYURETHANE UMBILICAL VESSEL CATHETERSINGLE LUMEN5 FR/CH(1.7 MM) X 9-8/10" (25 CM)0.41 ML PRIME VOLUME": Brand: ARGYLE

## (undated) DEVICE — SUTURE MCRYL SZ 4-0 L18IN ABSRB UD L19MM PS-2 3/8 CIR PRIM Y496G

## (undated) DEVICE — KIT POS PT CARE KT W/ GENTLE TCH WILSON +

## (undated) DEVICE — SOLUTION IV IRRIG POUR BRL 0.9% SODIUM CHL 2F7124

## (undated) DEVICE — YANKAUER SUCTION INSTRUMENT WITHOUT CONTROL VENT, OPEN TIP, CLEAR: Brand: YANKAUER

## (undated) DEVICE — DRAPE,UTILITY,XL,4/PK,STERILE: Brand: MEDLINE